# Patient Record
Sex: MALE | NOT HISPANIC OR LATINO | Employment: OTHER | URBAN - METROPOLITAN AREA
[De-identification: names, ages, dates, MRNs, and addresses within clinical notes are randomized per-mention and may not be internally consistent; named-entity substitution may affect disease eponyms.]

---

## 2023-08-08 LAB
ANION GAP IN SER/PLAS: 15 MMOL/L (ref 10–20)
CALCIUM (MG/DL) IN SER/PLAS: 8.5 MG/DL (ref 8.6–10.3)
CARBON DIOXIDE, TOTAL (MMOL/L) IN SER/PLAS: 24 MMOL/L (ref 21–32)
CHLORIDE (MMOL/L) IN SER/PLAS: 103 MMOL/L (ref 98–107)
CREATININE (MG/DL) IN SER/PLAS: 1.06 MG/DL (ref 0.5–1.3)
ERYTHROCYTE DISTRIBUTION WIDTH (RATIO) BY AUTOMATED COUNT: 13.2 % (ref 11.5–14.5)
ERYTHROCYTE MEAN CORPUSCULAR HEMOGLOBIN CONCENTRATION (G/DL) BY AUTOMATED: 33.1 G/DL (ref 32–36)
ERYTHROCYTE MEAN CORPUSCULAR VOLUME (FL) BY AUTOMATED COUNT: 92 FL (ref 80–100)
ERYTHROCYTES (10*6/UL) IN BLOOD BY AUTOMATED COUNT: 5.64 X10E12/L (ref 4.5–5.9)
GFR MALE: 70 ML/MIN/1.73M2
GLUCOSE (MG/DL) IN SER/PLAS: 83 MG/DL (ref 74–99)
HEMATOCRIT (%) IN BLOOD BY AUTOMATED COUNT: 51.9 % (ref 41–52)
HEMOGLOBIN (G/DL) IN BLOOD: 17.2 G/DL (ref 13.5–17.5)
LEUKOCYTES (10*3/UL) IN BLOOD BY AUTOMATED COUNT: 7.8 X10E9/L (ref 4.4–11.3)
MAGNESIUM (MG/DL) IN SER/PLAS: 1.92 MG/DL (ref 1.6–2.4)
PLATELETS (10*3/UL) IN BLOOD AUTOMATED COUNT: 153 X10E9/L (ref 150–450)
POTASSIUM (MMOL/L) IN SER/PLAS: 4.1 MMOL/L (ref 3.5–5.3)
SODIUM (MMOL/L) IN SER/PLAS: 138 MMOL/L (ref 136–145)
UREA NITROGEN (MG/DL) IN SER/PLAS: 14 MG/DL (ref 6–23)

## 2023-10-04 ENCOUNTER — OFFICE VISIT (OUTPATIENT)
Dept: PRIMARY CARE | Facility: CLINIC | Age: 83
End: 2023-10-04
Payer: MEDICARE

## 2023-10-04 VITALS
WEIGHT: 160 LBS | HEART RATE: 68 BPM | OXYGEN SATURATION: 96 % | BODY MASS INDEX: 23.7 KG/M2 | SYSTOLIC BLOOD PRESSURE: 126 MMHG | DIASTOLIC BLOOD PRESSURE: 79 MMHG | HEIGHT: 69 IN

## 2023-10-04 DIAGNOSIS — I69.398 CVA, OLD, HOMONYMOUS HEMIANOPSIA: ICD-10-CM

## 2023-10-04 DIAGNOSIS — R49.0 DYSPHONIA: ICD-10-CM

## 2023-10-04 DIAGNOSIS — E11.65 TYPE 2 DIABETES MELLITUS WITH HYPERGLYCEMIA, WITHOUT LONG-TERM CURRENT USE OF INSULIN (MULTI): ICD-10-CM

## 2023-10-04 DIAGNOSIS — I10 ESSENTIAL (PRIMARY) HYPERTENSION: ICD-10-CM

## 2023-10-04 DIAGNOSIS — N52.9 ERECTILE DYSFUNCTION, UNSPECIFIED ERECTILE DYSFUNCTION TYPE: Primary | ICD-10-CM

## 2023-10-04 DIAGNOSIS — H53.469 CVA, OLD, HOMONYMOUS HEMIANOPSIA: ICD-10-CM

## 2023-10-04 DIAGNOSIS — I48.0 PAROXYSMAL ATRIAL FIBRILLATION (MULTI): ICD-10-CM

## 2023-10-04 PROBLEM — G89.29 CHRONIC LEFT SHOULDER PAIN: Status: ACTIVE | Noted: 2023-10-04

## 2023-10-04 PROBLEM — J34.89 NASAL CONGESTION WITH RHINORRHEA: Status: RESOLVED | Noted: 2023-10-04 | Resolved: 2023-10-04

## 2023-10-04 PROBLEM — L91.8 OTHER HYPERTROPHIC DISORDERS OF THE SKIN: Status: ACTIVE | Noted: 2023-05-09

## 2023-10-04 PROBLEM — J02.9 ACUTE PHARYNGITIS: Status: RESOLVED | Noted: 2023-10-04 | Resolved: 2023-10-04

## 2023-10-04 PROBLEM — I25.10 CVD (CARDIOVASCULAR DISEASE): Status: ACTIVE | Noted: 2023-10-04

## 2023-10-04 PROBLEM — R97.20 HIGH PROSTATE SPECIFIC ANTIGEN (PSA): Status: RESOLVED | Noted: 2019-08-21 | Resolved: 2023-10-04

## 2023-10-04 PROBLEM — D18.01 HEMANGIOMA OF SKIN AND SUBCUTANEOUS TISSUE: Status: ACTIVE | Noted: 2023-05-09

## 2023-10-04 PROBLEM — L30.4 ERYTHEMA INTERTRIGO: Status: RESOLVED | Noted: 2023-05-09 | Resolved: 2023-10-04

## 2023-10-04 PROBLEM — L85.3 XEROSIS CUTIS: Status: ACTIVE | Noted: 2023-05-09

## 2023-10-04 PROBLEM — D22.70 MELANOCYTIC NEVI OF UNSPECIFIED LOWER LIMB, INCLUDING HIP: Status: RESOLVED | Noted: 2023-05-09 | Resolved: 2023-10-04

## 2023-10-04 PROBLEM — K76.0 STEATOSIS OF LIVER: Status: ACTIVE | Noted: 2020-02-25

## 2023-10-04 PROBLEM — I63.9 CEREBRAL INFARCTION (MULTI): Status: ACTIVE | Noted: 2023-10-04

## 2023-10-04 PROBLEM — H53.462 HOMONYMOUS HEMIANOPIA, LEFT: Status: ACTIVE | Noted: 2023-03-12

## 2023-10-04 PROBLEM — U07.1 COVID-19: Status: RESOLVED | Noted: 2023-10-04 | Resolved: 2023-10-04

## 2023-10-04 PROBLEM — L23.9 ECZEMA, ALLERGIC: Status: ACTIVE | Noted: 2023-10-04

## 2023-10-04 PROBLEM — M18.0 ARTHRITIS OF CARPOMETACARPAL (CMC) JOINT OF BOTH THUMBS: Status: ACTIVE | Noted: 2023-10-04

## 2023-10-04 PROBLEM — Z86.73 HISTORY OF CEREBROVASCULAR ACCIDENT: Status: ACTIVE | Noted: 2022-03-02

## 2023-10-04 PROBLEM — L81.4 OTHER MELANIN HYPERPIGMENTATION: Status: ACTIVE | Noted: 2023-05-09

## 2023-10-04 PROBLEM — R29.898 LEFT ARM WEAKNESS: Status: ACTIVE | Noted: 2023-10-04

## 2023-10-04 PROBLEM — D22.5 MELANOCYTIC NEVI OF TRUNK: Status: RESOLVED | Noted: 2023-05-09 | Resolved: 2023-10-04

## 2023-10-04 PROBLEM — R40.1 CLOUDED CONSCIOUSNESS: Status: ACTIVE | Noted: 2023-10-04

## 2023-10-04 PROBLEM — C44.311 BASAL CELL CARCINOMA OF SKIN OF NOSE: Status: ACTIVE | Noted: 2023-05-09

## 2023-10-04 PROBLEM — H53.10 SUBJECTIVE VISUAL DISTURBANCE: Status: ACTIVE | Noted: 2023-03-12

## 2023-10-04 PROBLEM — S01.501D UNSPECIFIED OPEN WOUND OF LIP, SUBSEQUENT ENCOUNTER: Status: ACTIVE | Noted: 2023-05-09

## 2023-10-04 PROBLEM — E78.2 MIXED HYPERLIPIDEMIA: Status: ACTIVE | Noted: 2023-10-04

## 2023-10-04 PROBLEM — L71.9 ROSACEA, UNSPECIFIED: Status: ACTIVE | Noted: 2023-05-09

## 2023-10-04 PROBLEM — F41.9 CHRONIC ANXIETY: Status: ACTIVE | Noted: 2022-06-07

## 2023-10-04 PROBLEM — R27.8 DECREASED COORDINATION: Status: ACTIVE | Noted: 2023-10-04

## 2023-10-04 PROBLEM — E78.00 HYPERCHOLESTEROLEMIA: Status: ACTIVE | Noted: 2019-08-21

## 2023-10-04 PROBLEM — R41.4: Status: ACTIVE | Noted: 2023-10-04

## 2023-10-04 PROBLEM — H92.01 OTALGIA, RIGHT: Status: ACTIVE | Noted: 2023-10-04

## 2023-10-04 PROBLEM — Z85.828 PERSONAL HISTORY OF OTHER MALIGNANT NEOPLASM OF SKIN: Status: ACTIVE | Noted: 2023-05-09

## 2023-10-04 PROBLEM — D69.49 PRIMARY THROMBOCYTOPENIA (MULTI): Status: ACTIVE | Noted: 2020-02-25

## 2023-10-04 PROBLEM — L90.5 SCAR CONDITION AND FIBROSIS OF SKIN: Status: ACTIVE | Noted: 2023-05-09

## 2023-10-04 PROBLEM — L82.1 OTHER SEBORRHEIC KERATOSIS: Status: RESOLVED | Noted: 2023-05-09 | Resolved: 2023-10-04

## 2023-10-04 PROBLEM — L57.0 ACTINIC KERATOSIS: Status: RESOLVED | Noted: 2023-05-09 | Resolved: 2023-10-04

## 2023-10-04 PROBLEM — J02.9 SORE THROAT: Status: RESOLVED | Noted: 2023-10-04 | Resolved: 2023-10-04

## 2023-10-04 PROBLEM — I69.30 HISTORY OF STROKE WITH CURRENT RESIDUAL EFFECTS: Status: ACTIVE | Noted: 2023-10-04

## 2023-10-04 PROBLEM — M25.512 CHRONIC LEFT SHOULDER PAIN: Status: ACTIVE | Noted: 2023-10-04

## 2023-10-04 PROBLEM — R05.9 COUGH: Status: RESOLVED | Noted: 2023-10-04 | Resolved: 2023-10-04

## 2023-10-04 PROBLEM — D22.60 MELANOCYTIC NEVI OF UNSPECIFIED UPPER LIMB, INCLUDING SHOULDER: Status: RESOLVED | Noted: 2023-05-09 | Resolved: 2023-10-04

## 2023-10-04 PROBLEM — C44.90 MALIGNANT NEOPLASM OF SKIN: Status: ACTIVE | Noted: 2019-08-21

## 2023-10-04 PROBLEM — D48.5 NEOPLASM OF UNCERTAIN BEHAVIOR OF SKIN: Status: ACTIVE | Noted: 2023-05-09

## 2023-10-04 PROBLEM — K80.20 CHOLELITHIASIS WITHOUT OBSTRUCTION: Status: RESOLVED | Noted: 2020-02-25 | Resolved: 2023-10-04

## 2023-10-04 PROBLEM — L82.0 INFLAMED SEBORRHEIC KERATOSIS: Status: RESOLVED | Noted: 2023-05-09 | Resolved: 2023-10-04

## 2023-10-04 PROBLEM — L21.9 SEBORRHEIC DERMATITIS, UNSPECIFIED: Status: ACTIVE | Noted: 2023-05-09

## 2023-10-04 PROBLEM — I63.411 CEREBROVASCULAR ACCIDENT (CVA) DUE TO EMBOLISM OF RIGHT MIDDLE CEREBRAL ARTERY (MULTI): Status: ACTIVE | Noted: 2023-10-04

## 2023-10-04 PROBLEM — F41.9 ANXIETY: Status: ACTIVE | Noted: 2023-10-04

## 2023-10-04 PROBLEM — R09.81 NASAL CONGESTION WITH RHINORRHEA: Status: RESOLVED | Noted: 2023-10-04 | Resolved: 2023-10-04

## 2023-10-04 PROBLEM — R41.841 COGNITIVE COMMUNICATION DEFICIT: Status: ACTIVE | Noted: 2023-10-04

## 2023-10-04 PROCEDURE — 3074F SYST BP LT 130 MM HG: CPT | Performed by: FAMILY MEDICINE

## 2023-10-04 PROCEDURE — 1036F TOBACCO NON-USER: CPT | Performed by: FAMILY MEDICINE

## 2023-10-04 PROCEDURE — 99203 OFFICE O/P NEW LOW 30 MIN: CPT | Performed by: FAMILY MEDICINE

## 2023-10-04 PROCEDURE — 1159F MED LIST DOCD IN RCRD: CPT | Performed by: FAMILY MEDICINE

## 2023-10-04 PROCEDURE — 3078F DIAST BP <80 MM HG: CPT | Performed by: FAMILY MEDICINE

## 2023-10-04 RX ORDER — METOPROLOL SUCCINATE 50 MG/1
1 TABLET, EXTENDED RELEASE ORAL DAILY
COMMUNITY
End: 2023-10-04 | Stop reason: SDUPTHER

## 2023-10-04 RX ORDER — SERTRALINE HYDROCHLORIDE 25 MG/1
1 TABLET, FILM COATED ORAL DAILY
COMMUNITY
Start: 2022-01-18 | End: 2023-10-04 | Stop reason: ALTCHOICE

## 2023-10-04 RX ORDER — DESONIDE 0.5 MG/G
CREAM TOPICAL
COMMUNITY
Start: 2022-07-13 | End: 2023-10-04 | Stop reason: ALTCHOICE

## 2023-10-04 RX ORDER — FLUOROURACIL 50 MG/G
CREAM TOPICAL
COMMUNITY
Start: 2017-10-06 | End: 2023-10-04 | Stop reason: ALTCHOICE

## 2023-10-04 RX ORDER — ROSUVASTATIN CALCIUM 20 MG/1
TABLET, COATED ORAL
COMMUNITY
End: 2023-10-04 | Stop reason: ALTCHOICE

## 2023-10-04 RX ORDER — ASPIRIN 81 MG/1
81 TABLET ORAL 2 TIMES DAILY
COMMUNITY

## 2023-10-04 RX ORDER — AMMONIUM LACTATE 12 G/100G
CREAM TOPICAL
COMMUNITY
Start: 2022-08-24 | End: 2023-10-04 | Stop reason: ALTCHOICE

## 2023-10-04 RX ORDER — KETOCONAZOLE 20 MG/G
CREAM TOPICAL
COMMUNITY
Start: 2022-04-27 | End: 2023-10-04 | Stop reason: ALTCHOICE

## 2023-10-04 RX ORDER — METOPROLOL SUCCINATE 100 MG/1
100 TABLET, EXTENDED RELEASE ORAL
COMMUNITY
Start: 2023-06-29

## 2023-10-04 RX ORDER — PIMECROLIMUS 10 MG/G
CREAM TOPICAL
COMMUNITY
Start: 2022-12-16 | End: 2023-10-04 | Stop reason: ALTCHOICE

## 2023-10-04 RX ORDER — ALBUTEROL SULFATE 90 UG/1
AEROSOL, METERED RESPIRATORY (INHALATION)
COMMUNITY
Start: 2022-11-19 | End: 2023-10-04 | Stop reason: ALTCHOICE

## 2023-10-04 RX ORDER — DEXAMETHASONE 6 MG/1
TABLET ORAL
COMMUNITY
Start: 2022-11-19 | End: 2023-10-04 | Stop reason: ALTCHOICE

## 2023-10-04 ASSESSMENT — ENCOUNTER SYMPTOMS
DIFFICULTY URINATING: 0
WHEEZING: 0
DYSURIA: 0
LIGHT-HEADEDNESS: 0
LOSS OF SENSATION IN FEET: 0
CONFUSION: 0
VOMITING: 0
DIZZINESS: 0
CHILLS: 0
NUMBNESS: 0
TROUBLE SWALLOWING: 0
COUGH: 0
UNEXPECTED WEIGHT CHANGE: 0
BLOOD IN STOOL: 0
DIARRHEA: 0
ABDOMINAL PAIN: 0
NAUSEA: 0
DEPRESSION: 0
WEAKNESS: 0
SHORTNESS OF BREATH: 0
FEVER: 0
OCCASIONAL FEELINGS OF UNSTEADINESS: 0

## 2023-10-04 ASSESSMENT — PATIENT HEALTH QUESTIONNAIRE - PHQ9
1. LITTLE INTEREST OR PLEASURE IN DOING THINGS: NOT AT ALL
SUM OF ALL RESPONSES TO PHQ9 QUESTIONS 1 AND 2: 0
2. FEELING DOWN, DEPRESSED OR HOPELESS: NOT AT ALL

## 2023-10-04 NOTE — PROGRESS NOTES
"Subjective   Patient ID: Lv Quinteros is a 83 y.o. male who presents for Establish Care (Pt presents as new pt to establish care, c/o erectile dysfunction, wants to discuss testing, no rxs needed.BL).  HPI    Previously saw Dr. Ruiz, last saw him   Sees cardiology [facility] Dr. Nicholas Rivas twice a year. Reports no longer advised to take an anticoagulant.  Sees Allan Wallis for h/o SCC.  Not seeing urology. Denies hesitancy.     H/o stroke. No longer seeing neurology. Residual homonomous hemianopsia, decreased sensation left hand, voice change, mildly labile emotions (laughing or crying easily).    Would like to try Viagra or Cialis.    Just got COVID shot.      Review of Systems   Constitutional:  Negative for chills, fever and unexpected weight change.   HENT:  Negative for ear pain and trouble swallowing.    Respiratory:  Negative for cough, shortness of breath and wheezing.    Cardiovascular:  Negative for chest pain.   Gastrointestinal:  Negative for abdominal pain, blood in stool, diarrhea, nausea and vomiting.   Genitourinary:  Negative for difficulty urinating and dysuria.   Skin:  Negative for rash.   Neurological:  Negative for dizziness, syncope, weakness, light-headedness and numbness.   Psychiatric/Behavioral:  Negative for behavioral problems and confusion.          Objective   /79   Pulse 68   Ht 1.753 m (5' 9\")   Wt 72.6 kg (160 lb)   SpO2 96%   BMI 23.63 kg/m²     Physical Exam  Vitals and nursing note reviewed.   Constitutional:       General: He is not in acute distress.     Appearance: Normal appearance. He is not diaphoretic.   HENT:      Head: Normocephalic and atraumatic.   Eyes:      General: No scleral icterus.     Extraocular Movements: Extraocular movements intact.      Conjunctiva/sclera: Conjunctivae normal.   Cardiovascular:      Rate and Rhythm: Normal rate and regular rhythm.      Heart sounds: Normal heart sounds.   Pulmonary:      Effort: Pulmonary effort is normal. No " respiratory distress.      Breath sounds: Normal breath sounds.   Abdominal:      General: Bowel sounds are normal. There is no distension.      Palpations: Abdomen is soft. There is no mass.      Tenderness: There is no abdominal tenderness. There is no guarding or rebound.   Musculoskeletal:      Right lower leg: No edema.      Left lower leg: No edema.   Skin:     General: Skin is warm and dry.      Coloration: Skin is not jaundiced.   Neurological:      General: No focal deficit present.      Mental Status: He is alert and oriented to person, place, and time. Mental status is at baseline.   Psychiatric:         Mood and Affect: Mood normal.         Thought Content: Thought content normal.         Assessment/Plan   Problem List Items Addressed This Visit       Essential (primary) hypertension     Well controlled.          Relevant Orders    Lipid Panel    Hepatic Function Panel    Albumin, urine, random    TSH with reflex to Free T4 if abnormal    Type 2 diabetes mellitus with hyperglycemia (CMS/HCC)    Relevant Orders    Hemoglobin A1C    Paroxysmal atrial fibrillation (CMS/HCC)    Relevant Medications    metoprolol succinate XL (Toprol-XL) 100 mg 24 hr tablet    CVA, old, homonymous hemianopsia    Relevant Orders    Referral to ENT    Erectile dysfunction - Primary     May consider Sildenafil or Tadalafil if ok with cardiology.         Dysphonia    Relevant Orders    Referral to ENT

## 2023-10-04 NOTE — PATIENT INSTRUCTIONS
Could consider Viagra/sildenafil or Cialis/tadalafil, but only if ok with your cardiologist. Please check with Dr. Rivas, to see if he's ok with one of these medications.    Please return for a follow-up appointment in 3 months, earlier if any question or concern.    Otolarynglogy (ENT)  Dr. Juan Ramon Bonilla, Timothy Campoverde, Usman Jung ([place]) [phone]  Dr. Mona Ellington ([place]) [phone]  Dr. Sasha Oswald ([place]) [phone]  Dr. Sher Goodman ([place]) [phone]      For assistance with scheduling referrals or consultations, please call [phone]. For laboratory, radiology, and other tests, please call [phone] ([phone] for pediatrics). Please review prescription inserts and published information for possible adverse effects of all medications. Return after testing or consultation to review results and recommendations, if symptoms persist, change, worsen, or return, or if you have any question or concern. If you do not get results within 7-10 days, or you have any question or concern, please send a message, call the office ([phone]), or return to the office for a follow-up appointment. For non-emergencies, you may call the office. For emergencies, call 9-1-1 or go to the nearest Emergency Department. Please schedule additional appointment(s) to address concern(s) not addressed today.    In general, results are not released or discussed over the telephone. Results of tests done through [facility] are released via  [employer] (see below).  [email or web address]   [employer] support line: [phone]    Until we complete our transition to the new system, additional information can be found at [email or web address] or on your Android or iOS (iPhone, iPad) device using the [employer] rik available free of charge in your device's rik store.

## 2023-10-17 ENCOUNTER — LAB (OUTPATIENT)
Dept: LAB | Facility: LAB | Age: 83
End: 2023-10-17
Payer: MEDICARE

## 2023-10-17 DIAGNOSIS — I10 ESSENTIAL (PRIMARY) HYPERTENSION: ICD-10-CM

## 2023-10-17 DIAGNOSIS — E11.65 TYPE 2 DIABETES MELLITUS WITH HYPERGLYCEMIA, WITHOUT LONG-TERM CURRENT USE OF INSULIN (MULTI): ICD-10-CM

## 2023-10-17 LAB
ALBUMIN SERPL BCP-MCNC: 4 G/DL (ref 3.4–5)
ALP SERPL-CCNC: 42 U/L (ref 33–136)
ALT SERPL W P-5'-P-CCNC: 21 U/L (ref 10–52)
AST SERPL W P-5'-P-CCNC: 18 U/L (ref 9–39)
BILIRUB DIRECT SERPL-MCNC: 0.2 MG/DL (ref 0–0.3)
BILIRUB SERPL-MCNC: 1 MG/DL (ref 0–1.2)
CHOLEST SERPL-MCNC: 185 MG/DL (ref 0–199)
CHOLESTEROL/HDL RATIO: 4.2
HDLC SERPL-MCNC: 43.8 MG/DL
LDLC SERPL CALC-MCNC: 118 MG/DL
NON HDL CHOLESTEROL: 141 MG/DL (ref 0–149)
PROT SERPL-MCNC: 5.8 G/DL (ref 6.4–8.2)
TRIGL SERPL-MCNC: 116 MG/DL (ref 0–149)
TSH SERPL-ACNC: 1.39 MIU/L (ref 0.44–3.98)
VLDL: 23 MG/DL (ref 0–40)

## 2023-10-17 PROCEDURE — 84443 ASSAY THYROID STIM HORMONE: CPT

## 2023-10-17 PROCEDURE — 80076 HEPATIC FUNCTION PANEL: CPT

## 2023-10-17 PROCEDURE — 83036 HEMOGLOBIN GLYCOSYLATED A1C: CPT

## 2023-10-17 PROCEDURE — 82570 ASSAY OF URINE CREATININE: CPT

## 2023-10-17 PROCEDURE — 82043 UR ALBUMIN QUANTITATIVE: CPT

## 2023-10-17 PROCEDURE — 80061 LIPID PANEL: CPT

## 2023-10-17 PROCEDURE — 36415 COLL VENOUS BLD VENIPUNCTURE: CPT

## 2023-10-18 LAB
CREAT UR-MCNC: 73.4 MG/DL (ref 20–370)
EST. AVERAGE GLUCOSE BLD GHB EST-MCNC: 100 MG/DL
HBA1C MFR BLD: 5.1 %
MICROALBUMIN UR-MCNC: <7 MG/L
MICROALBUMIN/CREAT UR: NORMAL MG/G{CREAT}

## 2023-10-25 ENCOUNTER — OFFICE VISIT (OUTPATIENT)
Dept: ORTHOPEDIC SURGERY | Facility: CLINIC | Age: 83
End: 2023-10-25
Payer: MEDICARE

## 2023-10-25 ENCOUNTER — HOSPITAL ENCOUNTER (OUTPATIENT)
Dept: RADIOLOGY | Facility: HOSPITAL | Age: 83
Discharge: HOME | End: 2023-10-25
Payer: MEDICARE

## 2023-10-25 VITALS — BODY MASS INDEX: 22.19 KG/M2 | WEIGHT: 155 LBS | HEIGHT: 70 IN

## 2023-10-25 DIAGNOSIS — M25.562 LEFT KNEE PAIN, UNSPECIFIED CHRONICITY: ICD-10-CM

## 2023-10-25 DIAGNOSIS — M25.562 ACUTE PAIN OF LEFT KNEE: ICD-10-CM

## 2023-10-25 DIAGNOSIS — M25.562 ACUTE PAIN OF LEFT KNEE: Primary | ICD-10-CM

## 2023-10-25 DIAGNOSIS — M17.12 ARTHRITIS OF LEFT KNEE: ICD-10-CM

## 2023-10-25 PROCEDURE — 1160F RVW MEDS BY RX/DR IN RCRD: CPT | Performed by: ORTHOPAEDIC SURGERY

## 2023-10-25 PROCEDURE — 73564 X-RAY EXAM KNEE 4 OR MORE: CPT | Mod: LT

## 2023-10-25 PROCEDURE — 99204 OFFICE O/P NEW MOD 45 MIN: CPT | Performed by: ORTHOPAEDIC SURGERY

## 2023-10-25 PROCEDURE — 73564 X-RAY EXAM KNEE 4 OR MORE: CPT | Mod: LEFT SIDE | Performed by: RADIOLOGY

## 2023-10-25 PROCEDURE — 1159F MED LIST DOCD IN RCRD: CPT | Performed by: ORTHOPAEDIC SURGERY

## 2023-10-25 PROCEDURE — 1036F TOBACCO NON-USER: CPT | Performed by: ORTHOPAEDIC SURGERY

## 2023-10-26 RX ORDER — MELOXICAM 15 MG/1
15 TABLET ORAL DAILY PRN
Qty: 30 TABLET | Refills: 11 | Status: SHIPPED | OUTPATIENT
Start: 2023-10-26 | End: 2024-01-31 | Stop reason: ALTCHOICE

## 2023-10-26 NOTE — PROGRESS NOTES
83-year-old male who injured his left knee 4 days prior.  The patient was getting out of limousine when he slipped and sustained a hyperflexion to the left knee.  He was trapped in that position for at least a few minutes.  Since then he had a significant amount of pain in the left knee.  However the pain has been improving.  At first he could not walk on the leg but has been able to be putting more weight on the leg.  Pain is worse activity better with rest    Patients' self reported past medical history, medications, allergies, surgical history, family and social history as well as a 10 point review of systems has been documented in the new patient intake form and scanned into the patient's electronic medical record.  The intake form was reviewed by Dr Willett during the office visit and signed by Dr. Willett and the patient.  Pertinent findings are documented in the HPI.    General Multi-System Physical Exam:  Constitutional  General appearance:  Alert, oriented, and in no acute distress.  Well developed, well nourished.  Head and Face  Head and face:  Normocephalic and atraumatic.  Ears, Nose, Mouth, and Throat  External inspection of ears and nose: Normal.  Eyes:  Pupils are equal and round.  Neck  Neck:  no neck mass was observed.  Pulmonary  Respiratory effort:  no respiratory distress.  Cardiovascular  Intact distal pulses.  Lymphatic  Palpation of lymph nodes in the affected extremity:  Normal.  Skin  Skin and subcutaneous tissue:  Normal skin color and pigmentation.  Normal skin turgor.  No rashes.  Neurologic  Sensation:  normal to light touch.  Psychiatric  Judgement and insight:  Intact.  Mood and affect:  Normal.  Musculoskeletal  Range of motion left knee is from 0 to 110 degrees with no effusion mild medial joint line tenderness no lateral joint line tenderness.  Patient has a negative Lockman exam, negative anterior and negative posterior drawer. The knee is stable to varus and valgus stress without  pain. Patient is neurovascularly intact in the bilateral lower extremities.      X-rays of the patient were ordered by Dr Willett and obtained today.  Dr Willett personally reviewed the results of the x-rays.    In addition, Dr Willett independently interpreted the patient's x-rays (performed by the Radiology department) by viewing the x-ray images and this is Dr. Willett's personal interpretation:     Left knee mild medial compartment arthritis    I had a long discussion with the patient regards to his left knee.  We offered him a steroid injection but he wanted to hold off on this.  We gave him prescription for meloxicam.  If his pain worsens I would want to see him back again we would then try an injection or possibly get an MRI of the knee.  I will see him back as needed        This patient has a new, acute, previously-undiagnosed problem of their affected extremity.  We will begin treatment as listed here and monitor treatment based upon their progression and response to treatment.  Due to the fact that we are just beginning treatment on this issue, this is currently considered an undiagnosed new problem with uncertain prognosis.  The exact diagnosis and their prognosis will depend upon their response to treatment and progression of their condition as time progresses.    Due to this patient's condition, they are at a moderate risk of morbidity from additional diagnostic testing / treatment.      To help them with their pain, I wrote them a prescription for prescription strength anti-inflammatories.  The patient was informed that there are risks of using nonsteroidal antiinflammatory (NSAID) medications.    Risks of NSAIDS include, but are not limited to, upset stomach, ulcers in the stomach and other places in the gastrointestinal tract, and a mild increase in cardiovascular risk as a result of the antiinflammatory medications.  In addition, there is an increased risk in bleeding as a result of the medications.     The patient was advised to stop taking the NSAIDs if they cause them to have an upset stomach.  NSAIDs are not supposed to be taken every day for more than a few weeks.  If they have any questions or problems with the antiinflammatory medications, they should stop taking the medication immediately and call the office.

## 2023-10-30 RX ORDER — RIVAROXABAN 20 MG/1
1 TABLET, FILM COATED ORAL DAILY
COMMUNITY
End: 2024-01-31 | Stop reason: ALTCHOICE

## 2023-10-30 RX ORDER — CLINDAMYCIN HYDROCHLORIDE 300 MG/1
1 CAPSULE ORAL 3 TIMES DAILY
COMMUNITY
End: 2024-01-31 | Stop reason: ALTCHOICE

## 2023-10-30 RX ORDER — OFLOXACIN 3 MG/ML
1 SOLUTION/ DROPS OPHTHALMIC 4 TIMES DAILY
COMMUNITY
End: 2024-01-31 | Stop reason: ALTCHOICE

## 2023-10-30 RX ORDER — LORAZEPAM 0.5 MG/1
0.5 TABLET ORAL DAILY PRN
COMMUNITY
End: 2024-01-31 | Stop reason: ALTCHOICE

## 2023-10-30 RX ORDER — SODIUM SULFACETAMIDE, SULFUR 90; 40 MG/473.2ML; MG/473.2ML
LIQUID TOPICAL DAILY
COMMUNITY
End: 2024-01-08 | Stop reason: SDUPTHER

## 2023-10-30 RX ORDER — TACROLIMUS 1 MG/G
OINTMENT TOPICAL
COMMUNITY
Start: 2022-10-05 | End: 2024-01-31 | Stop reason: ALTCHOICE

## 2023-10-30 RX ORDER — FLUOCINOLONE ACETONIDE 0.11 MG/ML
5 OIL AURICULAR (OTIC) 2 TIMES DAILY
COMMUNITY
End: 2024-01-31 | Stop reason: ALTCHOICE

## 2023-10-30 RX ORDER — PREDNISOLONE ACETATE 10 MG/ML
1 SUSPENSION/ DROPS OPHTHALMIC 4 TIMES DAILY
COMMUNITY
End: 2024-01-31 | Stop reason: ALTCHOICE

## 2023-11-01 ENCOUNTER — TELEPHONE (OUTPATIENT)
Dept: PRIMARY CARE | Facility: CLINIC | Age: 83
End: 2023-11-01

## 2023-11-07 NOTE — TELEPHONE ENCOUNTER
"----- Message from hCad Buchanan DO sent at 11/7/2023  2:54 PM EST -----  Please let patient know that his LDL (a \"bad\" cholesterol) is near optimal. Recommend a low-sugar, low-fat, low-cholesterol, high-fiber, heart-healthy diet and lifestyle, maintaining a healthy blood pressure, and regular cardio exercise and weight loss as appropriate.     His TSH (thyroid-stimulating hormone), A1c, and hepatic/liver panel are normal or unremarkable.  "

## 2024-01-04 RX ORDER — ERYTHROMYCIN 5 MG/G
OINTMENT OPHTHALMIC
COMMUNITY
Start: 2023-10-30 | End: 2024-01-31 | Stop reason: ALTCHOICE

## 2024-01-08 ENCOUNTER — OFFICE VISIT (OUTPATIENT)
Dept: DERMATOLOGY | Facility: CLINIC | Age: 84
End: 2024-01-08
Payer: MEDICARE

## 2024-01-08 DIAGNOSIS — D22.9 BENIGN NEVUS: ICD-10-CM

## 2024-01-08 DIAGNOSIS — L90.5 SCAR CONDITIONS AND FIBROSIS OF SKIN: ICD-10-CM

## 2024-01-08 DIAGNOSIS — L21.9 SEBORRHEIC DERMATITIS: ICD-10-CM

## 2024-01-08 DIAGNOSIS — L81.4 LENTIGO: ICD-10-CM

## 2024-01-08 DIAGNOSIS — L82.1 SEBORRHEIC KERATOSIS: ICD-10-CM

## 2024-01-08 DIAGNOSIS — Z85.828 HISTORY OF NONMELANOMA SKIN CANCER: ICD-10-CM

## 2024-01-08 DIAGNOSIS — D18.01 ANGIOMA OF SKIN: ICD-10-CM

## 2024-01-08 DIAGNOSIS — D48.5 NEOPLASM OF UNCERTAIN BEHAVIOR OF SKIN: Primary | ICD-10-CM

## 2024-01-08 PROCEDURE — 1159F MED LIST DOCD IN RCRD: CPT | Performed by: NURSE PRACTITIONER

## 2024-01-08 PROCEDURE — 11102 TANGNTL BX SKIN SINGLE LES: CPT | Performed by: NURSE PRACTITIONER

## 2024-01-08 PROCEDURE — 99213 OFFICE O/P EST LOW 20 MIN: CPT | Performed by: NURSE PRACTITIONER

## 2024-01-08 PROCEDURE — 1036F TOBACCO NON-USER: CPT | Performed by: NURSE PRACTITIONER

## 2024-01-08 PROCEDURE — 88305 TISSUE EXAM BY PATHOLOGIST: CPT | Performed by: DERMATOLOGY

## 2024-01-08 PROCEDURE — 88305 TISSUE EXAM BY PATHOLOGIST: CPT | Mod: TC,DER | Performed by: NURSE PRACTITIONER

## 2024-01-08 PROCEDURE — 1160F RVW MEDS BY RX/DR IN RCRD: CPT | Performed by: NURSE PRACTITIONER

## 2024-01-08 RX ORDER — SODIUM SULFACETAMIDE, SULFUR 90; 40 MG/473.2ML; MG/473.2ML
1 LIQUID TOPICAL DAILY
Qty: 473 ML | Refills: 11 | Status: SHIPPED | OUTPATIENT
Start: 2024-01-08 | End: 2024-01-12 | Stop reason: SDUPTHER

## 2024-01-08 NOTE — PATIENT INSTRUCTIONS

## 2024-01-08 NOTE — PROGRESS NOTES
Subjective     Lv Quinteros is a 83 y.o. male who presents for the following: Skin Check (Waist up).     Review of Systems:  No other skin or systemic complaints other than what is documented elsewhere in the note.    The following portions of the chart were reviewed this encounter and updated as appropriate:   Tobacco  Allergies  Meds  Problems  Med Hx  Surg Hx         Skin Cancer History  No skin cancer on file.      Specialty Problems          Dermatology Problems    Malignant neoplasm of skin    Hemangioma of skin and subcutaneous tissue    Other hypertrophic disorders of the skin    Other melanin hyperpigmentation    Personal history of other malignant neoplasm of skin    Rosacea, unspecified    Scar condition and fibrosis of skin    Seborrheic dermatitis, unspecified    Xerosis cutis    Eczema, allergic        Objective   Well appearing patient in no apparent distress; mood and affect are within normal limits.    A focused skin examination was performed waist up. All findings within normal limits unless otherwise noted below.    Assessment/Plan   1. Neoplasm of uncertain behavior of skin  Right Forearm - Posterior  4 mm hyperkeratotic erythematous papule          Lesion biopsy  Type of biopsy: tangential    Informed consent: discussed and consent obtained    Timeout: patient name, date of birth, surgical site, and procedure verified    Procedure prep:  Patient was prepped and draped  Anesthesia: the lesion was anesthetized in a standard fashion    Anesthetic:  1% lidocaine plain local infiltration  Instrument used: DermaBlade    Hemostasis achieved with: electrodesiccation    Outcome: patient tolerated procedure well    Post-procedure details: wound care instructions given    Additional details:  Cleaned area with isopropyl alcohol prior to anesthesia or biopsy. Applied thin layer of vaseline and covered with bandaid after procedure      Staff Communication: Dermatology Local Anesthesia: 1 % Plain  Lidocaine - Amount:0.5 ml    Specimen 1 - Dermatopathology- DERM LAB  Differential Diagnosis: NMSC  Check Margins Yes/No?:    Comments:    Dermpath Lab: Routine Histopathology (formalin-fixed tissue)    NUB  - Given uncertainty in clinical diagnosis, shave biopsy is recommended in clinic today.  - The patient expressed understanding, is in agreement with this plan, and wishes to proceed with biopsy.  - Oral and written wound care instructions provided.  - Advised patient that the office will call within 2 weeks to discuss biopsy results.      2. Angioma of skin  Scattered cherry-red papule(s).    A cherry hemangioma is a small macule (small, flat, smooth area) or papule (small, solid bump) formed from an overgrowth of tiny blood vessels in the skin. Cherry hemangiomas are characteristically red or purplish in color. They often first appear in middle adulthood and usually increase in number with age. Cherry hemangiomas are noncancerous (benign) and are common in adults.    The present appearance of the lesion is not worrisome but it should continue to be observed and testing/treatment may be warranted if change occurs.    3. Benign nevus  Scattered, uniform and benign-appearing, regular brown melanocytic papules and macules.    The present appearance of the lesion is not worrisome but it should continue to be observed and testing/treatment may be warranted if change occurs.    4. Seborrheic keratosis  Stuck on verrucous, tan-brown papules and plaques.      Seborrheic keratoses are common noncancerous (benign) growths of unknown cause seen in adults due to a thickening of an area of the top skin layer. Seborrheic keratoses may appear as if they are stuck on to the skin. They have distinct borders, and they may appear as papules (small, solid bumps) or plaques (solid, raised patches that are bigger than a thumbnail). They may be the same color as your skin, or they may be pink, light brown, darker brown, or very dark  brown, or sometimes may appear black.    There is no way to prevent new seborrheic keratoses from forming. Seborrheic keratoses can be removed, but removal is considered a cosmetic issue and is usually not covered by insurance.    PLAN  No treatment is needed unless there is irritation from clothing, such as itching or bleeding.  2.   Some lotions containing alpha hydroxy acids, salicylic acid, or urea may make the areas feel smoother with regular use but will not eliminate them.    5. Lentigo  Scattered tan macules in sun-exposed areas.    A solar lentigo (plural, solar lentigines), also known as a sun-induced freckle or senile lentigo, is a dark (hyperpigmented) lesion caused by natural or artificial ultraviolet (UV) light. Solar lentigines may be single or multiple. This type of lentigo is different from a simple lentigo (lentigo simplex) because it is caused by exposure to UV light. Solar lentigines are benign, but they do indicate excessive sun exposure, a risk factor for the development of skin cancer.    To prevent solar lentigines, avoid exposure to sunlight in midday (10 AM to 3 PM), wear sun-protective clothing (tightly woven clothes and hats), and apply sunscreen (SPF 30 UVA and UVB block).    The present appearance of the lesion is not worrisome but it should continue to be observed and testing/treatment may be warranted if change occurs.    6. History of nonmelanoma skin cancer    ABCDEs of melanoma and atypical moles were discussed with the patient.    Patient was instructed to perform monthly self skin examination.  We recommended that the patient have regular full skin exams given an increased risk of subsequent skin cancers.    The patient was instructed to use sun protective behaviors including use of broad spectrum sunscreens and sun protective clothing to reduce risk of skin cancers.    Warning signs of non-melanoma skin cancer discussed.    7. Scar conditions and fibrosis of skin (2)  Left Ala  Nasi, Right Forearm - Posterior  Well healed scar at the site(s) of prior treatment with no evidence of recurrence.                The scar is clear, there is no evidence of recurrence.  The present appearance of the scar is not worrisome but it should continue to be observed and testing/treatment may be warranted if change occurs.      8. Seborrheic dermatitis  Chest - Medial (Center), Head - Anterior (Face), Left Abdomen (side) - Upper, Left Breast, Left Inframammary Fold, Left Lower Back, Left Upper Back, Mid Back, Right Abdomen (side) - Upper, Right Breast, Right Lower Back, Right Upper Back  Mixture of dry and greasy scaly patches    PLAN    Restart SS wash 1-2 times daily as needed rinse after 3-5 minutes.     Related Medications  sulfacetamide sodium-sulfur 9-4 % cleanser  Apply 1 Application topically once daily.        Return in 6 months for routine skin check or return to clinic sooner if needed

## 2024-01-10 LAB
LABORATORY COMMENT REPORT: NORMAL
PATH REPORT.FINAL DX SPEC: NORMAL
PATH REPORT.GROSS SPEC: NORMAL
PATH REPORT.MICROSCOPIC SPEC OTHER STN: NORMAL
PATH REPORT.RELEVANT HX SPEC: NORMAL
PATH REPORT.TOTAL CANCER: NORMAL

## 2024-01-12 ENCOUNTER — TELEPHONE (OUTPATIENT)
Dept: DERMATOLOGY | Facility: CLINIC | Age: 84
End: 2024-01-12
Payer: MEDICARE

## 2024-01-12 DIAGNOSIS — L21.9 SEBORRHEIC DERMATITIS: ICD-10-CM

## 2024-01-12 RX ORDER — SODIUM SULFACETAMIDE, SULFUR 90; 40 MG/473.2ML; MG/473.2ML
1 LIQUID TOPICAL DAILY
Qty: 473 ML | Refills: 11 | Status: SHIPPED | OUTPATIENT
Start: 2024-01-12

## 2024-01-12 NOTE — TELEPHONE ENCOUNTER
Patient would like his Sulfacetamide sodium sulfur cleanser sent into [facility] in [place].

## 2024-01-12 NOTE — TELEPHONE ENCOUNTER
Pre skin cancer. Return to clinic in the next 1-2 months for cryotherapy given on the margins.   Patient will have this area treated at his 6 month appt on 07/08/24

## 2024-01-31 ENCOUNTER — OFFICE VISIT (OUTPATIENT)
Dept: PRIMARY CARE | Facility: CLINIC | Age: 84
End: 2024-01-31
Payer: MEDICARE

## 2024-01-31 VITALS
OXYGEN SATURATION: 94 % | DIASTOLIC BLOOD PRESSURE: 65 MMHG | WEIGHT: 166.38 LBS | HEIGHT: 70 IN | SYSTOLIC BLOOD PRESSURE: 132 MMHG | HEART RATE: 56 BPM | BODY MASS INDEX: 23.82 KG/M2

## 2024-01-31 DIAGNOSIS — K40.90 RIGHT INGUINAL HERNIA: Primary | ICD-10-CM

## 2024-01-31 PROCEDURE — 3078F DIAST BP <80 MM HG: CPT | Performed by: FAMILY MEDICINE

## 2024-01-31 PROCEDURE — 1157F ADVNC CARE PLAN IN RCRD: CPT | Performed by: FAMILY MEDICINE

## 2024-01-31 PROCEDURE — 3075F SYST BP GE 130 - 139MM HG: CPT | Performed by: FAMILY MEDICINE

## 2024-01-31 PROCEDURE — 1160F RVW MEDS BY RX/DR IN RCRD: CPT | Performed by: FAMILY MEDICINE

## 2024-01-31 PROCEDURE — 1159F MED LIST DOCD IN RCRD: CPT | Performed by: FAMILY MEDICINE

## 2024-01-31 PROCEDURE — 99214 OFFICE O/P EST MOD 30 MIN: CPT | Performed by: FAMILY MEDICINE

## 2024-01-31 PROCEDURE — 1036F TOBACCO NON-USER: CPT | Performed by: FAMILY MEDICINE

## 2024-01-31 RX ORDER — LORATADINE 10 MG/1
10 TABLET ORAL 2 TIMES DAILY
COMMUNITY

## 2024-01-31 ASSESSMENT — ENCOUNTER SYMPTOMS
TROUBLE SWALLOWING: 0
OCCASIONAL FEELINGS OF UNSTEADINESS: 0
NUMBNESS: 0
CHILLS: 0
NAUSEA: 0
WEAKNESS: 0
SHORTNESS OF BREATH: 0
DIARRHEA: 0
DIZZINESS: 0
LOSS OF SENSATION IN FEET: 0
FEVER: 0
VOMITING: 0
UNEXPECTED WEIGHT CHANGE: 0
DIFFICULTY URINATING: 0
ABDOMINAL PAIN: 0
BLOOD IN STOOL: 0
CONFUSION: 0
DEPRESSION: 0
WHEEZING: 0
COUGH: 0
LIGHT-HEADEDNESS: 0
DYSURIA: 0

## 2024-01-31 ASSESSMENT — PATIENT HEALTH QUESTIONNAIRE - PHQ9
2. FEELING DOWN, DEPRESSED OR HOPELESS: NOT AT ALL
SUM OF ALL RESPONSES TO PHQ9 QUESTIONS 1 AND 2: 0
1. LITTLE INTEREST OR PLEASURE IN DOING THINGS: NOT AT ALL

## 2024-01-31 NOTE — PATIENT INSTRUCTIONS
"Go to the ER/call 911 for: inability to \"reduce\" the hernia, inability to pass gas or have a bowel movement, bloody or black tarry stool, severe pain, bruising or redness over the hernia, nausea and vomiting, or other serious concern.    General Surgery  Dr. Adair Baltazar, Elvi Wright PA-C, Dr. Franky Brambila, Dr. Cathryn Encarnacion, Kiersten Berkowitz CNP, Dr. Ade Bradley ([place]) [phone]  Dr. Diane Perez ([place]) [phone]  Dr. Renato Moreno, Dr. Brian Mathews, Dr. Aracely Allen, Radha Sadler [facility] ([place]) [phone]       Please return or seek medical attention if symptoms persist, change, worsen, or return. For emergencies, call 9-1-1 or go to the nearest Emergency Room.    Avoid taking Biotin for a week prior to any blood tests, as it can interfere with certain results. Fasting for labs means 12 hours, nothing to eat or drink, except water and medications, unless directed otherwise.    For assistance with scheduling referrals or consultations, please call [phone]. For laboratory, radiology, and other tests, please call [phone] ([phone] for pediatrics). Please review prescription inserts and published information for possible adverse effects of all medications. Return after testing or consultation to review results and recommendations, if symptoms persist, change, worsen, or return, or if you have any question or concern. If you do not get results within 7-10 days, or you have any question or concern, please send a message, call the office ([phone]), or return to the office for a follow-up appointment. For non-emergencies, you may call the office. For emergencies, call 9-1-1 or go to the nearest Emergency Department. Please schedule additional appointment(s) to address concern(s) not addressed today.    In general, results are not released or discussed over the telephone, but at an appointment or via  [employer]. Results of tests done through [facility] are released via  [employer] " (see below).  [email or web address]  [facility] MyChart support line: [phone]

## 2024-01-31 NOTE — PROGRESS NOTES
"Subjective   Patient ID: Lv Quinteros is a 83 y.o. male who presents for Hernia (Pt presents c/o hernia/possible pulled muscle in groin x 3 week painful, swelling L foot, go over labs, check ears.BL).  HPI    2w ago squatting down to pick something up, got sudden sharp pain right groin. Tender, burning. Difficulty arising to standing. Not painful to walk. Laying down feels ok. \"Feels a little bulgey.\" Tried Tylenol, did help. Denies constipation, bloody or black tarry stool, diarrhea. Painful there to cough. Painful to lift heavy objects.    Plans to go to [facility] on 2-.    Review of Systems   Constitutional:  Negative for chills, fever and unexpected weight change.   HENT:  Negative for ear pain and trouble swallowing.    Respiratory:  Negative for cough, shortness of breath and wheezing.    Cardiovascular:  Negative for chest pain.   Gastrointestinal:  Negative for abdominal pain, blood in stool, diarrhea, nausea and vomiting.   Genitourinary:  Negative for difficulty urinating and dysuria.   Skin:  Negative for rash.   Neurological:  Negative for dizziness, syncope, weakness, light-headedness and numbness.   Psychiatric/Behavioral:  Negative for behavioral problems and confusion.          Objective   /65   Pulse 56   Ht 1.778 m (5' 10\")   Wt 75.5 kg (166 lb 6 oz)   SpO2 94%   BMI 23.87 kg/m²     Physical Exam  Vitals and nursing note reviewed.   Constitutional:       General: He is not in acute distress.     Appearance: Normal appearance.   HENT:      Head: Normocephalic and atraumatic.   Eyes:      General: No scleral icterus.     Extraocular Movements: Extraocular movements intact.      Conjunctiva/sclera: Conjunctivae normal.   Pulmonary:      Effort: Pulmonary effort is normal. No respiratory distress.   Abdominal:      Hernia: A hernia (right reducible direct inguinal hernia, very tender) is present.   Skin:     General: Skin is warm and dry.      Coloration: Skin is not jaundiced. "   Neurological:      Mental Status: He is alert and oriented to person, place, and time. Mental status is at baseline.   Psychiatric:         Behavior: Behavior normal.         Assessment/Plan   Problem List Items Addressed This Visit       Right inguinal hernia - Primary     Reducible. Follow-up with general surgery. Advised getting a surgeon's opinion and recommendations prior to making a trip to any area that might not have the facilities to treat a strangulated hernia/surgical emergency. Reviewed hernia reducibility, and signs/symptoms of hernia incarceration or strangulation.         Relevant Orders    Referral to General Surgery

## 2024-01-31 NOTE — ASSESSMENT & PLAN NOTE
Reducible. Follow-up with general surgery. Advised getting a surgeon's opinion and recommendations prior to making a trip to any area that might not have the facilities to treat a strangulated hernia/surgical emergency. Reviewed hernia reducibility, and signs/symptoms of hernia incarceration or strangulation.

## 2024-02-03 ENCOUNTER — HOSPITAL ENCOUNTER (EMERGENCY)
Facility: HOSPITAL | Age: 84
Discharge: HOME | End: 2024-02-03
Payer: MEDICARE

## 2024-02-03 VITALS
BODY MASS INDEX: 23.34 KG/M2 | HEIGHT: 70 IN | TEMPERATURE: 97.2 F | DIASTOLIC BLOOD PRESSURE: 72 MMHG | WEIGHT: 163 LBS | OXYGEN SATURATION: 95 % | RESPIRATION RATE: 16 BRPM | SYSTOLIC BLOOD PRESSURE: 150 MMHG | HEART RATE: 88 BPM

## 2024-02-03 DIAGNOSIS — K40.90 UNILATERAL INGUINAL HERNIA WITHOUT OBSTRUCTION OR GANGRENE, RECURRENCE NOT SPECIFIED: Primary | ICD-10-CM

## 2024-02-03 PROCEDURE — 99283 EMERGENCY DEPT VISIT LOW MDM: CPT

## 2024-02-03 PROCEDURE — 2500000001 HC RX 250 WO HCPCS SELF ADMINISTERED DRUGS (ALT 637 FOR MEDICARE OP): Performed by: PHYSICIAN ASSISTANT

## 2024-02-03 RX ORDER — IBUPROFEN 600 MG/1
600 TABLET ORAL ONCE
Status: COMPLETED | OUTPATIENT
Start: 2024-02-03 | End: 2024-02-03

## 2024-02-03 RX ORDER — IBUPROFEN 600 MG/1
600 TABLET ORAL EVERY 6 HOURS PRN
Qty: 28 TABLET | Refills: 0 | Status: SHIPPED | OUTPATIENT
Start: 2024-02-03 | End: 2024-02-10

## 2024-02-03 RX ADMIN — IBUPROFEN 600 MG: 600 TABLET, FILM COATED ORAL at 16:01

## 2024-02-03 ASSESSMENT — LIFESTYLE VARIABLES
EVER HAD A DRINK FIRST THING IN THE MORNING TO STEADY YOUR NERVES TO GET RID OF A HANGOVER: NO
HAVE PEOPLE ANNOYED YOU BY CRITICIZING YOUR DRINKING: NO
HAVE YOU EVER FELT YOU SHOULD CUT DOWN ON YOUR DRINKING: NO
EVER FELT BAD OR GUILTY ABOUT YOUR DRINKING: NO

## 2024-02-03 ASSESSMENT — PAIN SCALES - GENERAL
PAINLEVEL_OUTOF10: 10 - WORST POSSIBLE PAIN
PAINLEVEL_OUTOF10: 3

## 2024-02-03 ASSESSMENT — PAIN DESCRIPTION - PAIN TYPE: TYPE: ACUTE PAIN

## 2024-02-03 ASSESSMENT — PAIN DESCRIPTION - DESCRIPTORS: DESCRIPTORS: STABBING;BURNING

## 2024-02-03 ASSESSMENT — PAIN DESCRIPTION - ORIENTATION: ORIENTATION: RIGHT

## 2024-02-03 ASSESSMENT — PAIN DESCRIPTION - LOCATION
LOCATION: ABDOMEN
LOCATION: GROIN

## 2024-02-03 ASSESSMENT — COLUMBIA-SUICIDE SEVERITY RATING SCALE - C-SSRS
6. HAVE YOU EVER DONE ANYTHING, STARTED TO DO ANYTHING, OR PREPARED TO DO ANYTHING TO END YOUR LIFE?: NO
1. IN THE PAST MONTH, HAVE YOU WISHED YOU WERE DEAD OR WISHED YOU COULD GO TO SLEEP AND NOT WAKE UP?: NO

## 2024-02-03 ASSESSMENT — PAIN - FUNCTIONAL ASSESSMENT
PAIN_FUNCTIONAL_ASSESSMENT: 0-10
PAIN_FUNCTIONAL_ASSESSMENT: 0-10

## 2024-02-03 NOTE — ED PROVIDER NOTES
HPI   Chief Complaint   Patient presents with    Hernia     Right groin hernia for 3 weeks.  Was told by PCP to come in if it got larger or painful,       This is an 83-year-old male coming in for right inguinal hernia.  Patient states that it does reduce when he lays down.  He states he has had increased discomfort when he is up and walking.  He has an appointment with 2 surgeons on Tuesday both Dr. Bradley and Dr. Baltazar.  Patient reports that he is coming in because of the increased pain however also reports that he bought a hernia belt at the store yesterday however has not used it yet.  Patient does take ibuprofen and states it does help his discomfort.  Denies any other areas of pain or swelling.  He denies any episodes where it does not reduce.      History provided by:  Patient                      No data recorded                Patient History   Past Medical History:   Diagnosis Date    Actinic keratosis 05/09/2023    Cholelithiasis without obstruction 02/25/2020    Cough 10/04/2023    COVID-19 10/04/2023    Erythema intertrigo 05/09/2023    High prostate specific antigen (PSA) 08/21/2019    Inflamed seborrheic keratosis 05/09/2023    Melanocytic nevi of unspecified upper limb, including shoulder 05/09/2023    Sore throat 10/04/2023    Stroke (CMS/HCC)      History reviewed. No pertinent surgical history.  Family History   Problem Relation Name Age of Onset    Heart disease Mother      Allergies Parent      Heart disease Parent       Social History     Tobacco Use    Smoking status: Former     Types: Cigarettes    Smokeless tobacco: Never   Vaping Use    Vaping Use: Never used   Substance Use Topics    Alcohol use: Yes     Comment: 14a wk    Drug use: Never       Physical Exam   ED Triage Vitals [02/03/24 1512]   Temperature Heart Rate Respirations BP   36.3 °C (97.3 °F) 62 18 145/79      Pulse Ox Temp Source Heart Rate Source Patient Position   95 % Skin Monitor Sitting      BP Location FiO2 (%)     Left arm --        Physical Exam  Vitals and nursing note reviewed.   Constitutional:       General: He is not in acute distress.     Appearance: Normal appearance. He is not toxic-appearing.   HENT:      Head: Normocephalic and atraumatic.      Nose: Nose normal.      Mouth/Throat:      Mouth: Mucous membranes are moist.      Pharynx: Oropharynx is clear.   Eyes:      Extraocular Movements: Extraocular movements intact.      Conjunctiva/sclera: Conjunctivae normal.      Pupils: Pupils are equal, round, and reactive to light.   Cardiovascular:      Rate and Rhythm: Regular rhythm.      Pulses: Normal pulses.      Heart sounds: Normal heart sounds.   Pulmonary:      Effort: Pulmonary effort is normal. No respiratory distress.      Breath sounds: Normal breath sounds.   Abdominal:      General: Abdomen is flat. Bowel sounds are normal.      Palpations: Abdomen is soft.      Tenderness: There is no abdominal tenderness.      Hernia: A hernia is present. Hernia is present in the right inguinal area.   Genitourinary:     Comments: Right inguinal hernia that reduces when laying flat.  Musculoskeletal:         General: Normal range of motion.      Cervical back: Normal range of motion and neck supple.   Skin:     General: Skin is warm and dry.      Coloration: Skin is not jaundiced or pale.      Findings: No bruising.   Neurological:      General: No focal deficit present.      Mental Status: He is alert and oriented to person, place, and time. Mental status is at baseline.   Psychiatric:         Mood and Affect: Mood normal.         Behavior: Behavior normal.         ED Course & MDM   Diagnoses as of 02/03/24 1536   Unilateral inguinal hernia without obstruction or gangrene, recurrence not specified       Medical Decision Making  Summary:  Medical Decision Making:   Patient presented as described in HPI. Patient case including ROS, PE, and treatment and plan discussed with ED attending if attached as cosigner. Results from labs and or  imaging included below if completed. Lv Quinteros  is a 83 y.o. coming in for Patient presents with:  Hernia: Right groin hernia for 3 weeks.  Was told by PCP to come in if it got larger or painful,  .  During examination patient has noted hernia when standing however when he lays flat it does reduce that does not need reduction.  Patient will be given ibuprofen.  Will be given an Ace wrap in the meantime to help support the area however advise use his hernia belt.  He has an appointment with 2 surgeons on Tuesday both Dr. Bradley and Dr. Baltazar and advised follow-up with them.  Return with any episodes where the hernia does not reduce.  Patient agrees with treatment plan and states he will comply.      Patient was advised to follow up with PCP or recommended provider in 2-3 days for another evaluation and exam. I advised patient/guardian to return or go to closest emergency room immediately if symptoms change, get worse, new symptoms develop prior to follow up. If there is no improvement in symptoms in the next 24 hours they are advised to return for further evaluation and exam. I also explained the plan and treatment course. Patient/guardian is in agreement with plan, treatment course, and follow up and states verbally that they will comply.    Labs Reviewed - No data to display   No orders to display      Tests/Medications/Escalations of Care considered but not given: As in MDM    Patient care discussed with: N/A  Social Determinants affecting care: N/A    Final diagnosis and disposition as documented     Diagnoses as of 02/03/24 1544  Unilateral inguinal hernia without obstruction or gangrene, recurrence not specified       Homegoing. I discussed the differential; results and discharge plan with the patient and/or family/friend/caregiver if present.  I emphasized the importance of follow-up with the physician I referred them to in the timeframe recommended.  I explained reasons for the patient to return to the  Emergency Department. They agreed that if they feel their condition is worsening or if they have any other concern they should call 911 immediately for further assistance. I gave the patient an opportunity to ask all questions they had and answered all of them accordingly. They understand return precautions and discharge instructions. The patient and/or family/friend/caregiver expressed understanding verbally and that they would comply.     Disposition: Discharge      This note has been transcribed using voice recognition and may contain grammatical errors, misplaced words, incorrect words, incorrect phrases or other errors.         Procedure  Procedures     Franklin Luo PA-C  02/03/24 1546

## 2024-02-06 ENCOUNTER — OFFICE VISIT (OUTPATIENT)
Dept: SURGERY | Facility: CLINIC | Age: 84
End: 2024-02-06
Payer: MEDICARE

## 2024-02-06 ENCOUNTER — HOSPITAL ENCOUNTER (OUTPATIENT)
Facility: HOSPITAL | Age: 84
Setting detail: OUTPATIENT SURGERY
End: 2024-02-06
Attending: SURGERY | Admitting: SURGERY
Payer: MEDICARE

## 2024-02-06 VITALS
HEIGHT: 70 IN | SYSTOLIC BLOOD PRESSURE: 146 MMHG | HEART RATE: 56 BPM | OXYGEN SATURATION: 93 % | DIASTOLIC BLOOD PRESSURE: 65 MMHG | WEIGHT: 164.9 LBS | BODY MASS INDEX: 23.61 KG/M2

## 2024-02-06 DIAGNOSIS — K40.90 RIGHT INGUINAL HERNIA: Primary | ICD-10-CM

## 2024-02-06 PROCEDURE — 1125F AMNT PAIN NOTED PAIN PRSNT: CPT | Performed by: SURGERY

## 2024-02-06 PROCEDURE — 99203 OFFICE O/P NEW LOW 30 MIN: CPT | Performed by: SURGERY

## 2024-02-06 PROCEDURE — 3077F SYST BP >= 140 MM HG: CPT | Performed by: SURGERY

## 2024-02-06 PROCEDURE — 1160F RVW MEDS BY RX/DR IN RCRD: CPT | Performed by: SURGERY

## 2024-02-06 PROCEDURE — 1159F MED LIST DOCD IN RCRD: CPT | Performed by: SURGERY

## 2024-02-06 PROCEDURE — 1157F ADVNC CARE PLAN IN RCRD: CPT | Performed by: SURGERY

## 2024-02-06 PROCEDURE — 1036F TOBACCO NON-USER: CPT | Performed by: SURGERY

## 2024-02-06 PROCEDURE — 3078F DIAST BP <80 MM HG: CPT | Performed by: SURGERY

## 2024-02-06 RX ORDER — ENOXAPARIN SODIUM 300 MG/3ML
30 INJECTION INTRAVENOUS; SUBCUTANEOUS ONCE
Status: CANCELLED | OUTPATIENT
Start: 2024-02-06 | End: 2024-02-06

## 2024-02-06 NOTE — PROGRESS NOTES
General Surgery History and Physical    Referring Provider:  Chad Buchanan DO  No ref. provider found     Chief Complaint:  Chief Complaint   Patient presents with    New Patient Visit     Eval for hernia        History of Present Illness:  Lv Quinteros is a 83 y.o. male who presents with right groin bulge and pain.   Felt severe pain in the right groin and bulge about 3 weeks ago when he was squatting down to  a thing on the floor.   The bulge went back by itself when he lay down.   Pain on and off depending on position and activities   Currently, using belt to keep the bulge in. No pain when bulge is not out.   No obstruction symptoms.   Was seen in ED on 2/3 for pain. No imaging done.   Follow up today for surgical evaluation     Past Medical History:  Past Medical History:   Diagnosis Date    Actinic keratosis 05/09/2023    Cholelithiasis without obstruction 02/25/2020    Cough 10/04/2023    COVID-19 10/04/2023    Erythema intertrigo 05/09/2023    High prostate specific antigen (PSA) 08/21/2019    Inflamed seborrheic keratosis 05/09/2023    Melanocytic nevi of unspecified upper limb, including shoulder 05/09/2023    Sore throat 10/04/2023    Stroke (CMS/HCC)         Past Surgical History:  No past surgical history on file.     Medications:  Current Outpatient Medications   Medication Instructions    aspirin 81 mg, oral, 2 times daily    ibuprofen 600 mg, oral, Every 6 hours PRN    loratadine (CLARITIN) 10 mg, oral, Daily    metoprolol succinate XL (Toprol-XL) 100 mg 24 hr tablet Take 1 tablet (100 mg) by mouth. Take 1 tab in the morning, half tablet in the evening.    sulfacetamide sodium-sulfur 9-4 % cleanser 1 Application, Topical, Daily        Allergies:  Allergies   Allergen Reactions    Penicillins Hives, Rash, Shortness of breath and Unknown    Other Other     flushing, near-syncope    Statins-Hmg-Coa Reductase Inhibitors Unknown     muscle weakness on Lipitor; crestor ok        Family  History:  Family History   Problem Relation Name Age of Onset    Heart disease Mother      Allergies Parent      Heart disease Parent          Social History:  Social History     Socioeconomic History    Marital status:      Spouse name: Not on file    Number of children: Not on file    Years of education: Not on file    Highest education level: Not on file   Occupational History    Not on file   Tobacco Use    Smoking status: Former     Types: Cigarettes    Smokeless tobacco: Never   Vaping Use    Vaping Use: Never used   Substance and Sexual Activity    Alcohol use: Yes     Comment: 14a wk    Drug use: Never    Sexual activity: Not on file   Other Topics Concern    Not on file   Social History Narrative    Not on file     Social Determinants of Health     Financial Resource Strain: Not on file   Food Insecurity: Not on file   Transportation Needs: Not on file   Physical Activity: Not on file   Stress: Not on file   Social Connections: Not on file   Intimate Partner Violence: Not on file   Housing Stability: Not on file        Review of Systems:  A complete 12 point review of systems was performed. Please see scanned questionnaire and is otherwise negative except as noted in the history of present illness.    Vital Signs:  Vitals:    02/06/24 1308   BP: 146/65   Pulse: 56   SpO2: 93%      Body mass index is 23.66 kg/m².      Physical Exam:  General: No acute distress.   Neuro: Alert and oriented ×3. Follows commands.  Face: Atraumatic, no visible skin lesion.   Head: Atraumatic  Eyes: Pupils equal reactive to light. Extraocular motions intact.  Ears: Hears normal speaking voice.  Mouth, Nose, Throat: Mucous membranes moist.  Normal dentition.  Neck: Supple. No visible masses.  Breast: Not examined.   Chest: No appreciable scars or masses.   Heart/Pulse: Regular  Lung: Patent airway and no labored breath.   Vascular: Palpable radial pulses bilaterally.  Abdomen: Soft, NT,ND.   Right groin: reducible bulge  consistent with inguinal hernia. Moderate in size.   Rectal and Perianal: Not examined.   Genitourinary: Not examined.   Musculoskeletal: Moves all extremities.  Normal range of motion.  Extremities: No cyanosis. No edema.   Lymphatic: No palpable lymph nodes.  Skin: No rashes or lesions.  Psychological: Normal affect      Laboratory Values:  CBC:   Lab Results   Component Value Date    WBC 7.8 08/08/2023    RBC 5.64 08/08/2023    HGB 17.2 08/08/2023    HCT 51.9 08/08/2023     08/08/2023       RFP:   Lab Results   Component Value Date     08/08/2023    K 4.1 08/08/2023     08/08/2023    CO2 24 08/08/2023    BUN 14 08/08/2023    CREATININE 1.06 08/08/2023    CALCIUM 8.5 (L) 08/08/2023    MG 1.92 08/08/2023    PHOS 3.9 03/17/2021        LFTs:   Lab Results   Component Value Date    PROT 5.8 (L) 10/17/2023    ALBUMIN 4.0 10/17/2023    BILITOT 1.0 10/17/2023    BILIDIR 0.2 10/17/2023    ALKPHOS 42 10/17/2023    AST 18 10/17/2023    ALT 21 10/17/2023            Imaging:  I have personally reviewed the images and the radiologist's report.  No results found.      Assessment:  This is a 83 y.o. male who presents with reducible right inguinal hernia: moderate in size      Plan:  I discussed with the patient about treatment options and recommended laparoscopic hernia repair. Robot assisted laparoscopic hernia repair will be performed if robot is available on surgery day.   I explained to the patient about the risks and benefits of the surgery. Patient expressed understanding that the risks of surgery include but are not limited to bleeding, infection, possible intra-abdominal viscus injury which may require additional surgery to fix it, postoperative pain/seroma, hernia recurrence and mesh related complications which may require additional/extensive surgery to fix it, possibility for open surgery, risks of anesthesia, mortality in rare/extreme situations. Patient agreed to proceed.    Due to his age and  comorbidities including history CABG, Afib (was on eliquis, patient stopped it on his own due to skin rash), stroke and others, he will need PAT. I also advised him to contact his cardiologist office to see if he needs to be on anticoagulation or if other alternative meds to take.     Hernia surgery could be done electively. He will decide when to have surgery. He is planning on a trip to [facility]. He was instructed to call or ED if sign of incarceration, obstruction or strangulation.       Ade Bradley MD [facility]  General Surgery  Office: [phone]  Fax:     [phone]  2:14 PM   02/06/24

## 2024-02-07 ENCOUNTER — OFFICE VISIT (OUTPATIENT)
Dept: SURGERY | Facility: CLINIC | Age: 84
End: 2024-02-07
Payer: MEDICARE

## 2024-02-07 VITALS
OXYGEN SATURATION: 92 % | HEART RATE: 54 BPM | BODY MASS INDEX: 23.24 KG/M2 | SYSTOLIC BLOOD PRESSURE: 161 MMHG | DIASTOLIC BLOOD PRESSURE: 77 MMHG | WEIGHT: 162 LBS

## 2024-02-07 DIAGNOSIS — K40.90 RIGHT INGUINAL HERNIA: ICD-10-CM

## 2024-02-07 PROCEDURE — 3077F SYST BP >= 140 MM HG: CPT | Performed by: SURGERY

## 2024-02-07 PROCEDURE — 1036F TOBACCO NON-USER: CPT | Performed by: SURGERY

## 2024-02-07 PROCEDURE — 3078F DIAST BP <80 MM HG: CPT | Performed by: SURGERY

## 2024-02-07 PROCEDURE — 1160F RVW MEDS BY RX/DR IN RCRD: CPT | Performed by: SURGERY

## 2024-02-07 PROCEDURE — 1157F ADVNC CARE PLAN IN RCRD: CPT | Performed by: SURGERY

## 2024-02-07 PROCEDURE — 99214 OFFICE O/P EST MOD 30 MIN: CPT | Performed by: SURGERY

## 2024-02-07 PROCEDURE — 1125F AMNT PAIN NOTED PAIN PRSNT: CPT | Performed by: SURGERY

## 2024-02-07 PROCEDURE — 1159F MED LIST DOCD IN RCRD: CPT | Performed by: SURGERY

## 2024-02-07 NOTE — PROGRESS NOTES
General Surgery History and Physical    Referring Provider:  DO Dewayne Mckeon Timothy B, DO     Chief Complaint:  Chief Complaint   Patient presents with    New Patient Visit     Right inguinal hernia/second opinion        History of Present Illness:  This is a 83 y.o. male who presents with concerns for right inguinal hernia.  He reports that 2 weeks ago he has had sudden onset of pain in the right groin.  He reports that as time is gone on he has noticed a bulge in the right groin.  He reports that strenuous activity makes the pain worse.  He reports that lying down and relaxing makes it better.  He denies any issues in the left side.  He denies any fevers, chills, nausea, or vomiting.    Past Medical History:  Stroke  Coronary artery disease  Actinic keratosis  Cholelithiasis  Benign prostatic hypertrophy with elevated PSA    Past Surgical History:  Minimally invasive coronary artery bypass grafting  Colonoscopy  Esophagogastroduodenoscopy  Cataract replacement    Medications:  Aspirin  Metoprolol  Ibuprofen  Loratadine    Allergies:  Penicillins  Statins    Family History:  Heart disease  Hypertension    Social History:  .  Retired.  Quit smoking in 1980.  Drinks 1 alcoholic beverage a day.  Denies illicits.       Review of Systems:  A complete 12 point review of systems was performed and is negative except as noted in the history of present illness.      Vital Signs:  Vitals:    02/07/24 0936   BP: 161/77   Pulse: 54   SpO2: 92%          Physical Exam:  General: No acute distress. Sitting up in bed.   Neuro: Alert and oriented ×3. Follows commands.  Head: Atraumatic  Eyes: Pupils equal reactive to light. Extraocular motions intact.  Ears: Hears normal speaking voice.  Mouth, Nose, Throat: Mucous membranes moist.  Missing a couple teeth.  Neck: Supple. No appreciable masses.  Chest: No crepitus.  Left upper chest scar  Heart: Regular rate and rhythm.  Lung: Clear to auscultation  bilaterally.  Vascular: No carotid bruits.  Palpable radial pulses bilaterally.  Abdomen: Soft. Nondistended. Nontender.  Small right inguinal hernia.  Musculoskeletal: Moves all extremities.  Normal range of motion.  Lymphatic: No palpable lymph nodes.  Skin: No rashes or lesions.  Psychological: Normal affect      Laboratory Values:  None      Imaging:    None      Assessment:  This is a 83 y.o. male who presents with a symptomatic right inguinal hernia.  We discussed at length that I recommend a laparoscopic right inguinal hernia repair.      Plan:  -- We will plan for a laparoscopic right inguinal hernia repair with mesh.  -- We will plan for surgery to be performed as an outpatient.  -- We will obtain preoperative labwork including CBC, RFP, Magnesium, LFTs, INR, and Type and Screen.  -- We will obtain a preoperative chest X-ray and EKG.  -- We will obtain Preadmission Testing (PAT).  -- We will apply Dermabond, so the patient may shower the day after surgery.  No swimming or tub soaks for 2 weeks post-operatively.  -- No heavy lifting for 1 week after surgery.        Salinas Baltazar MD  General Surgery  Office: [phone]  Fax:     [phone]  9:47 AM   02/07/24

## 2024-03-25 ENCOUNTER — OFFICE VISIT (OUTPATIENT)
Dept: CARDIOLOGY | Facility: HOSPITAL | Age: 84
End: 2024-03-25
Payer: MEDICARE

## 2024-03-25 VITALS
BODY MASS INDEX: 23.57 KG/M2 | HEART RATE: 67 BPM | WEIGHT: 164.24 LBS | OXYGEN SATURATION: 94 % | SYSTOLIC BLOOD PRESSURE: 120 MMHG | DIASTOLIC BLOOD PRESSURE: 73 MMHG

## 2024-03-25 DIAGNOSIS — I48.91 ATRIAL FIBRILLATION, UNSPECIFIED TYPE (MULTI): ICD-10-CM

## 2024-03-25 DIAGNOSIS — Z01.810 PREOP CARDIOVASCULAR EXAM: Primary | ICD-10-CM

## 2024-03-25 DIAGNOSIS — R09.89 CAROTID BRUIT, UNSPECIFIED LATERALITY: ICD-10-CM

## 2024-03-25 PROCEDURE — 1160F RVW MEDS BY RX/DR IN RCRD: CPT | Performed by: NURSE PRACTITIONER

## 2024-03-25 PROCEDURE — 93005 ELECTROCARDIOGRAM TRACING: CPT | Performed by: NURSE PRACTITIONER

## 2024-03-25 PROCEDURE — 1036F TOBACCO NON-USER: CPT | Performed by: NURSE PRACTITIONER

## 2024-03-25 PROCEDURE — 93010 ELECTROCARDIOGRAM REPORT: CPT | Performed by: INTERNAL MEDICINE

## 2024-03-25 PROCEDURE — 3078F DIAST BP <80 MM HG: CPT | Performed by: NURSE PRACTITIONER

## 2024-03-25 PROCEDURE — 1157F ADVNC CARE PLAN IN RCRD: CPT | Performed by: NURSE PRACTITIONER

## 2024-03-25 PROCEDURE — 3074F SYST BP LT 130 MM HG: CPT | Performed by: NURSE PRACTITIONER

## 2024-03-25 PROCEDURE — 99203 OFFICE O/P NEW LOW 30 MIN: CPT | Performed by: NURSE PRACTITIONER

## 2024-03-25 PROCEDURE — 99213 OFFICE O/P EST LOW 20 MIN: CPT | Performed by: NURSE PRACTITIONER

## 2024-03-25 PROCEDURE — 1159F MED LIST DOCD IN RCRD: CPT | Performed by: NURSE PRACTITIONER

## 2024-03-25 ASSESSMENT — ENCOUNTER SYMPTOMS: DEPRESSION: 0

## 2024-03-25 NOTE — PROGRESS NOTES
Referred by Dr. Archibald for Pre-op Clearance (Laparoscopic right inguinal hernia repair with mesh.)     History Of Present Illness:   Dear Dr. Buchanan,     I had the pleasure of meeting Mr. Quinteros today at [facility] for perioperative cardiac clearance. The patient is seen in collaboration with Dr. Devi.  Mr. Quinteros is a very pleasant 83 year old gentleman with a history of PAF, CVA, right inguinal hernia, CAD s/p CABG x1 (1997) and HLD, former history of smoking. Family history of CAD. He is going to schedule hernia surgery. He denies chest pain, shortness of breath or heart palpitations. He has noticed some swelling in his left ankle. States he is not on anticoagulation due to a rash he had from the Eliquis. States he is active on a regular basis,is able to walk up two flights of stairs without dyspnea.     Review of Systems   Constitutional: Negative.   HENT: Negative.     Eyes: Negative.    Cardiovascular: Negative.    Respiratory: Negative.     Endocrine: Negative.    Hematologic/Lymphatic: Negative.    Skin: Negative.    Musculoskeletal:  Positive for myalgias.   Gastrointestinal:  Positive for abdominal pain.   Neurological: Negative.    Psychiatric/Behavioral: Negative.          Past Medical History:  He has a past medical history of Actinic keratosis (05/09/2023), Arrhythmia, Arthritis, Cholelithiasis without obstruction (02/25/2020), Cough (10/04/2023), COVID-19 (10/04/2023), Diabetes mellitus (CMS/[facility]), Erythema intertrigo (05/09/2023), High prostate specific antigen (PSA) (08/21/2019), Hypertension, Inflamed seborrheic keratosis (05/09/2023), Melanocytic nevi of unspecified upper limb, including shoulder (05/09/2023), Sore throat (10/04/2023), and Stroke (CMS/[facility]).    Past Surgical History:  He has no past surgical history on file.      Social History:  He reports that he has quit smoking. His smoking use included cigarettes. He has never used smokeless tobacco. He reports  current alcohol use of about 7.0 standard drinks of alcohol per week. He reports that he does not use drugs.    Family History:  Family History   Problem Relation Name Age of Onset    Heart disease Mother      Allergies Parent      Heart disease Parent          Allergies:  Penicillins, Other, and Statins-hmg-coa reductase inhibitors    Outpatient Medications:  Current Outpatient Medications   Medication Instructions    aspirin 81 mg, oral, 2 times daily    loratadine (CLARITIN) 10 mg, oral, Daily    metoprolol succinate XL (Toprol-XL) 100 mg 24 hr tablet Take 1 tablet (100 mg) by mouth. Take 1 tab in the morning, half tablet in the evening.    sulfacetamide sodium-sulfur 9-4 % cleanser 1 Application, Topical, Daily        Last Recorded Vitals:  Vitals:    03/25/24 1513   BP: 120/73   Pulse: 67   SpO2: 94%   Weight: 74.5 kg (164 lb 3.9 oz)       Physical Exam:  Physical Exam  Vitals reviewed.   HENT:      Head: Normocephalic.      Nose: Nose normal.   Eyes:      Pupils: Pupils are equal, round, and reactive to light.   Cardiovascular:      Rate and Rhythm: Normal rate and regular rhythm.   Pulmonary:      Effort: Pulmonary effort is normal.      Breath sounds: Normal breath sounds.   Abdominal:      General: Abdomen is flat.      Palpations: Abdomen is soft.   Musculoskeletal:         General: Normal range of motion.      Cervical back: Normal range of motion.   Skin:     General: Skin is warm and dry.   Neurological:      General: No focal deficit present.      Mental Status: He is alert and oriented to person, place, and time.   Psychiatric:         Mood and Affect: Mood normal.            Last Labs:  CBC -  Lab Results   Component Value Date    WBC 7.8 08/08/2023    HGB 17.2 08/08/2023    HCT 51.9 08/08/2023    MCV 92 08/08/2023     08/08/2023       CMP -  Lab Results   Component Value Date    CALCIUM 8.5 (L) 08/08/2023    PHOS 3.9 03/17/2021    PROT 5.8 (L) 10/17/2023    ALBUMIN 4.0 10/17/2023    AST 18  10/17/2023    ALT 21 10/17/2023    ALKPHOS 42 10/17/2023    BILITOT 1.0 10/17/2023       LIPID PANEL -   Lab Results   Component Value Date    CHOL 185 10/17/2023    TRIG 116 10/17/2023    HDL 43.8 10/17/2023    CHHDL 4.2 10/17/2023    LDLF 126 (H) 03/12/2021    VLDL 23 10/17/2023    NHDL 141 10/17/2023       RENAL FUNCTION PANEL -   Lab Results   Component Value Date    GLUCOSE 83 08/08/2023     08/08/2023    K 4.1 08/08/2023     08/08/2023    CO2 24 08/08/2023    ANIONGAP 15 08/08/2023    BUN 14 08/08/2023    CREATININE 1.06 08/08/2023    GFRMALE 70 08/08/2023    CALCIUM 8.5 (L) 08/08/2023    PHOS 3.9 03/17/2021    ALBUMIN 4.0 10/17/2023        Lab Results   Component Value Date     (H) 03/12/2021    HGBA1C 5.1 10/17/2023       Last Cardiology Tests:  ECG:  ECG 12 lead (Clinic Performed) 03/25/2024 independently reviewed showed sinus rhythm with RBBB heart rate 60 bpm     Echo:  Echo 3/12/2021   1. The left ventricular systolic function is normal with a 65-70% estimated ejection fraction.   2. Spectral Doppler shows an impaired relaxation pattern of left ventricular diastolic filling.   3. The left atrium is moderately dilated.   4. There is mild aortic valve regurgitation.   5. There is no evidence of a patent foramen ovale.   6. There is moderate dilatation of the ascending aorta.  Ejection Fractions:  LVEF 65-70%  Cath:    Stress Test:  NUCLEAR STRESS TEST 07/06/2022  1. SPECT Perfusion Study: Normal.    2. There is no scintigraphic evidence for inducible ischemia.    3. No evidence of scarred myocardium.    4. Left ventricle is normal in size. The left ventricle systolic   function is normal.    5. Right ventricle is normal in size. The right ventricle systolic   function is normal.    6. This is a low risk scan.             Gated Stress FBP Gated Rest FBP    LVEF % 67             Cardiac Imaging        Assessment/Plan   Mr. Quinteros is a very pleasant 83 year old gentleman with a history of  CAD s/p CABG x1 (1997), HLD, PAF, and a CVA, he is here for perioperative cardiac clearance for hernia surgery. He is able to walk up a flight of stairs without dyspnea. EKG shows a sinus rhythm. He is low cardiac risk for surgery and may proceed to the OR without further cardiac testing. We did discuss that importance of starting Eliquis after surgery, CHADS vasc score of 5 places him at high risk. Heart rate and blood pressure is well controlled today.     Plan   -call with any questions   -continue the Metoprolol   -low cardiac risk for surgery   -start Eliquis 5 mg twice a day after surgery   -carotid ultrasound   -follow up in six months     I appreciate the opportunity to participate in the patient's care, please call with any questions.         Skye Mcconnell, NUNO-CNP

## 2024-03-25 NOTE — PATIENT INSTRUCTIONS
CALL WITH ANY QUESTIONS   START ELIQUIS 5 MG TWICE A DAY AFTER SURGERY   CLEARED FOR SURGERY   CAROTID ULTRASOUND

## 2024-03-27 ASSESSMENT — ENCOUNTER SYMPTOMS
CARDIOVASCULAR NEGATIVE: 1
RESPIRATORY NEGATIVE: 1
NEUROLOGICAL NEGATIVE: 1
ENDOCRINE NEGATIVE: 1
ABDOMINAL PAIN: 1
HEMATOLOGIC/LYMPHATIC NEGATIVE: 1
PSYCHIATRIC NEGATIVE: 1
EYES NEGATIVE: 1
MYALGIAS: 1
CONSTITUTIONAL NEGATIVE: 1

## 2024-04-01 ENCOUNTER — HOSPITAL ENCOUNTER (OUTPATIENT)
Dept: VASCULAR MEDICINE | Facility: HOSPITAL | Age: 84
Discharge: HOME | End: 2024-04-01
Payer: MEDICARE

## 2024-04-01 DIAGNOSIS — R09.89 CAROTID BRUIT, UNSPECIFIED LATERALITY: ICD-10-CM

## 2024-04-01 PROCEDURE — 93880 EXTRACRANIAL BILAT STUDY: CPT

## 2024-04-01 PROCEDURE — 93880 EXTRACRANIAL BILAT STUDY: CPT | Performed by: STUDENT IN AN ORGANIZED HEALTH CARE EDUCATION/TRAINING PROGRAM

## 2024-04-04 ENCOUNTER — TELEPHONE (OUTPATIENT)
Dept: CARDIOLOGY | Facility: HOSPITAL | Age: 84
End: 2024-04-04
Payer: MEDICARE

## 2024-04-04 NOTE — TELEPHONE ENCOUNTER
----- Message from GRETCHEN Bell sent at 4/3/2024  7:30 PM EDT -----  Please call and let him know his carotid ultrasound is normal   Thanks   ----- Message -----  From: Anthony Lopezo - Cardiology Results In  Sent: 4/3/2024  12:33 PM EDT  To: GRETCHEN Bell

## 2024-04-10 ENCOUNTER — OFFICE VISIT (OUTPATIENT)
Dept: SURGERY | Facility: CLINIC | Age: 84
End: 2024-04-10
Payer: MEDICARE

## 2024-04-10 DIAGNOSIS — K40.90 RIGHT INGUINAL HERNIA: Primary | ICD-10-CM

## 2024-04-10 PROCEDURE — 1159F MED LIST DOCD IN RCRD: CPT | Performed by: SURGERY

## 2024-04-10 PROCEDURE — 1157F ADVNC CARE PLAN IN RCRD: CPT | Performed by: SURGERY

## 2024-04-10 PROCEDURE — 99213 OFFICE O/P EST LOW 20 MIN: CPT | Performed by: SURGERY

## 2024-04-10 PROCEDURE — 1160F RVW MEDS BY RX/DR IN RCRD: CPT | Performed by: SURGERY

## 2024-04-10 NOTE — PROGRESS NOTES
General Surgery Follow-Up Note    Referring Provider:   Chad Buchanan DO      Chief Complaint:  Inguinal hernia    History of Present Illness:  This is a 83 y.o. male who presents for a right inguinal hernia.  He was last seen 2 months ago.  He reports that since her last visit, he has had no changes in his symptoms in the right groin.  He denies any issues on the left side.  However, he does wish to discuss potential surgical repair again, and he has seen another surgeon in the region who performs the shoulders repair in the interim.      Vitals:   There were no vitals filed for this visit.      Physical Exam:  General: No acute distress. Sitting up in bed.   Neuro: Alert and oriented ×3. Follows commands.  Head: Atraumatic  Eyes: Pupils equal reactive to light. Extraocular motions intact.  Ears: Hears normal speaking voice.  Mouth, Nose, Throat: Mucous membranes moist.  Normal dentition.  Neck: Supple. No appreciable masses.  Chest: No crepitus.  No appreciable scars.  Heart: Regular rate and rhythm.  Lung: Clear to auscultation bilaterally.  Vascular: No carotid bruits.  Palpable radial pulses bilaterally.  Abdomen: Soft. Nondistended. Nontender.  Right inguinal hernia  Musculoskeletal: Moves all extremities.  Normal range of motion.  Lymphatic: No palpable lymph nodes.  Skin: No rashes or lesions.  Psychological: Normal affect    Labs:  None      Imaging:   None      Assessment:  This is a 83 y.o.-year-old male who presents for discussion of his right inguinal hernia.  We discussed indications for surgical repair, risks of surgery, risks of the mesh, risks of no mesh, and risks of recurrences.      Plan:  -- We will plan for a laparoscopic right inguinal hernia repair with mesh.  -- He will call to schedule if he desires.  -- We will plan for surgery to be performed as an outpatient.  -- We will obtain preoperative labwork including CBC, RFP, Magnesium, LFTs, INR, and Type and Screen.  -- We will obtain a  preoperative chest X-ray and EKG.  -- We will obtain Preadmission Testing (PAT) for his cardiac, neurologic, and arthritic history.  -- We will apply Dermabond, so the patient may shower the day after surgery.  No swimming or tub soaks for 2 weeks post-operatively.  -- No heavy lifting for 1 week after surgery.      Salinas Baltazar MD  General Surgery  Office: [phone]  Fax: [phone]  12:19 PM  04/10/24        Past Medical History:  Past Medical History:   Diagnosis Date    Actinic keratosis 05/09/2023    Arrhythmia     PAF    Arthritis     Cholelithiasis without obstruction 02/25/2020    Cough 10/04/2023    COVID-19 10/04/2023    Diabetes mellitus (CMS/HCC)     Erythema intertrigo 05/09/2023    High prostate specific antigen (PSA) 08/21/2019    Hypertension     Inflamed seborrheic keratosis 05/09/2023    Melanocytic nevi of unspecified upper limb, including shoulder 05/09/2023    Sore throat 10/04/2023    Stroke (CMS/HCC)     approx 3 yrs ago        Past Surgical History:  No past surgical history on file.     Medications:  Current Outpatient Medications   Medication Instructions    apixaban (ELIQUIS) 5 mg, oral, 2 times daily    aspirin 81 mg, oral, 2 times daily    loratadine (CLARITIN) 10 mg, oral, Daily    metoprolol succinate XL (Toprol-XL) 100 mg 24 hr tablet Take 1 tablet (100 mg) by mouth. Take 1 tab in the morning, half tablet in the evening.    sulfacetamide sodium-sulfur 9-4 % cleanser 1 Application, Topical, Daily        Allergies:  Allergies   Allergen Reactions    Penicillins Hives, Rash, Shortness of breath and Unknown    Other Other     flushing, near-syncope    Statins-Hmg-Coa Reductase Inhibitors Unknown     muscle weakness on Lipitor; crestor ok        Family History:  Family History   Problem Relation Name Age of Onset    Heart disease Mother      Allergies Parent      Heart disease Parent          Social History:  Social History     Socioeconomic History    Marital status:       Spouse name: Not on file    Number of children: Not on file    Years of education: Not on file    Highest education level: Not on file   Occupational History    Not on file   Tobacco Use    Smoking status: Former     Types: Cigarettes    Smokeless tobacco: Never   Vaping Use    Vaping status: Never Used   Substance and Sexual Activity    Alcohol use: Yes     Alcohol/week: 7.0 standard drinks of alcohol     Types: 7 Cans of beer per week    Drug use: Never    Sexual activity: Not on file   Other Topics Concern    Not on file   Social History Narrative    Not on file     Social Determinants of Health     Financial Resource Strain: Not on file   Food Insecurity: Not on file   Transportation Needs: Not on file   Physical Activity: Not on file   Stress: Not on file   Social Connections: Not on file   Intimate Partner Violence: Not on file   Housing Stability: Not on file        Review of Systems:  A complete 12 point review of systems was performed and is negative except as noted in the history of present illness.

## 2024-04-13 ENCOUNTER — APPOINTMENT (OUTPATIENT)
Dept: RADIOLOGY | Facility: HOSPITAL | Age: 84
End: 2024-04-13
Payer: MEDICARE

## 2024-04-13 ENCOUNTER — HOSPITAL ENCOUNTER (OUTPATIENT)
Facility: HOSPITAL | Age: 84
Setting detail: OBSERVATION
Discharge: HOME | End: 2024-04-14
Attending: STUDENT IN AN ORGANIZED HEALTH CARE EDUCATION/TRAINING PROGRAM | Admitting: INTERNAL MEDICINE
Payer: MEDICARE

## 2024-04-13 DIAGNOSIS — R10.13 ABDOMINAL PAIN, EPIGASTRIC: Primary | ICD-10-CM

## 2024-04-13 DIAGNOSIS — R10.13 EPIGASTRIC PAIN: ICD-10-CM

## 2024-04-13 LAB
ALBUMIN SERPL BCP-MCNC: 4 G/DL (ref 3.4–5)
ALP SERPL-CCNC: 38 U/L (ref 33–136)
ALT SERPL W P-5'-P-CCNC: 18 U/L (ref 10–52)
ANION GAP SERPL CALC-SCNC: 14 MMOL/L (ref 10–20)
AST SERPL W P-5'-P-CCNC: 19 U/L (ref 9–39)
ATRIAL RATE: 60 BPM
BASOPHILS # BLD AUTO: 0.06 X10*3/UL (ref 0–0.1)
BASOPHILS NFR BLD AUTO: 0.6 %
BILIRUB SERPL-MCNC: 0.7 MG/DL (ref 0–1.2)
BUN SERPL-MCNC: 17 MG/DL (ref 6–23)
CALCIUM SERPL-MCNC: 8.7 MG/DL (ref 8.6–10.3)
CARDIAC TROPONIN I PNL SERPL HS: 8 NG/L (ref 0–20)
CHLORIDE SERPL-SCNC: 103 MMOL/L (ref 98–107)
CO2 SERPL-SCNC: 24 MMOL/L (ref 21–32)
CREAT SERPL-MCNC: 1.05 MG/DL (ref 0.5–1.3)
EGFRCR SERPLBLD CKD-EPI 2021: 71 ML/MIN/1.73M*2
EOSINOPHIL # BLD AUTO: 0.31 X10*3/UL (ref 0–0.4)
EOSINOPHIL NFR BLD AUTO: 3 %
ERYTHROCYTE [DISTWIDTH] IN BLOOD BY AUTOMATED COUNT: 12.8 % (ref 11.5–14.5)
GLUCOSE SERPL-MCNC: 130 MG/DL (ref 74–99)
HCT VFR BLD AUTO: 47.9 % (ref 41–52)
HGB BLD-MCNC: 16.5 G/DL (ref 13.5–17.5)
IMM GRANULOCYTES # BLD AUTO: 0.03 X10*3/UL (ref 0–0.5)
IMM GRANULOCYTES NFR BLD AUTO: 0.3 % (ref 0–0.9)
LIPASE SERPL-CCNC: 46 U/L (ref 9–82)
LYMPHOCYTES # BLD AUTO: 1.82 X10*3/UL (ref 0.8–3)
LYMPHOCYTES NFR BLD AUTO: 17.4 %
MCH RBC QN AUTO: 31.9 PG (ref 26–34)
MCHC RBC AUTO-ENTMCNC: 34.4 G/DL (ref 32–36)
MCV RBC AUTO: 93 FL (ref 80–100)
MONOCYTES # BLD AUTO: 0.75 X10*3/UL (ref 0.05–0.8)
MONOCYTES NFR BLD AUTO: 7.2 %
NEUTROPHILS # BLD AUTO: 7.49 X10*3/UL (ref 1.6–5.5)
NEUTROPHILS NFR BLD AUTO: 71.5 %
NRBC BLD-RTO: 0 /100 WBCS (ref 0–0)
P AXIS: 51 DEGREES
P OFFSET: 160 MS
P ONSET: 107 MS
PLATELET # BLD AUTO: 162 X10*3/UL (ref 150–450)
POTASSIUM SERPL-SCNC: 3.5 MMOL/L (ref 3.5–5.3)
PR INTERVAL: 206 MS
PROT SERPL-MCNC: 6.4 G/DL (ref 6.4–8.2)
Q ONSET: 210 MS
QRS COUNT: 10 BEATS
QRS DURATION: 148 MS
QT INTERVAL: 436 MS
QTC CALCULATION(BAZETT): 436 MS
QTC FREDERICIA: 436 MS
R AXIS: -62 DEGREES
RBC # BLD AUTO: 5.18 X10*6/UL (ref 4.5–5.9)
SODIUM SERPL-SCNC: 137 MMOL/L (ref 136–145)
T AXIS: 4 DEGREES
T OFFSET: 428 MS
VENTRICULAR RATE: 60 BPM
WBC # BLD AUTO: 10.5 X10*3/UL (ref 4.4–11.3)

## 2024-04-13 PROCEDURE — 84484 ASSAY OF TROPONIN QUANT: CPT | Performed by: NURSE PRACTITIONER

## 2024-04-13 PROCEDURE — 80053 COMPREHEN METABOLIC PANEL: CPT | Performed by: NURSE PRACTITIONER

## 2024-04-13 PROCEDURE — 85025 COMPLETE CBC W/AUTO DIFF WBC: CPT | Performed by: NURSE PRACTITIONER

## 2024-04-13 PROCEDURE — 71045 X-RAY EXAM CHEST 1 VIEW: CPT

## 2024-04-13 PROCEDURE — 2500000004 HC RX 250 GENERAL PHARMACY W/ HCPCS (ALT 636 FOR OP/ED): Performed by: NURSE PRACTITIONER

## 2024-04-13 PROCEDURE — 83690 ASSAY OF LIPASE: CPT | Performed by: NURSE PRACTITIONER

## 2024-04-13 PROCEDURE — 71045 X-RAY EXAM CHEST 1 VIEW: CPT | Performed by: RADIOLOGY

## 2024-04-13 PROCEDURE — 99285 EMERGENCY DEPT VISIT HI MDM: CPT

## 2024-04-13 PROCEDURE — 96374 THER/PROPH/DIAG INJ IV PUSH: CPT

## 2024-04-13 PROCEDURE — 36415 COLL VENOUS BLD VENIPUNCTURE: CPT | Performed by: NURSE PRACTITIONER

## 2024-04-13 RX ORDER — FAMOTIDINE 10 MG/ML
20 INJECTION INTRAVENOUS ONCE
Status: COMPLETED | OUTPATIENT
Start: 2024-04-13 | End: 2024-04-13

## 2024-04-13 RX ADMIN — FAMOTIDINE 20 MG: 10 INJECTION, SOLUTION INTRAVENOUS at 23:15

## 2024-04-13 ASSESSMENT — LIFESTYLE VARIABLES
HAVE YOU EVER FELT YOU SHOULD CUT DOWN ON YOUR DRINKING: NO
EVER HAD A DRINK FIRST THING IN THE MORNING TO STEADY YOUR NERVES TO GET RID OF A HANGOVER: NO
HAVE PEOPLE ANNOYED YOU BY CRITICIZING YOUR DRINKING: NO
TOTAL SCORE: 0
EVER FELT BAD OR GUILTY ABOUT YOUR DRINKING: NO

## 2024-04-13 ASSESSMENT — PAIN DESCRIPTION - LOCATION: LOCATION: ABDOMEN

## 2024-04-13 ASSESSMENT — PAIN DESCRIPTION - DESCRIPTORS: DESCRIPTORS: BURNING

## 2024-04-13 ASSESSMENT — PAIN DESCRIPTION - ORIENTATION: ORIENTATION: MID;UPPER

## 2024-04-13 ASSESSMENT — COLUMBIA-SUICIDE SEVERITY RATING SCALE - C-SSRS
2. HAVE YOU ACTUALLY HAD ANY THOUGHTS OF KILLING YOURSELF?: NO
6. HAVE YOU EVER DONE ANYTHING, STARTED TO DO ANYTHING, OR PREPARED TO DO ANYTHING TO END YOUR LIFE?: NO
1. IN THE PAST MONTH, HAVE YOU WISHED YOU WERE DEAD OR WISHED YOU COULD GO TO SLEEP AND NOT WAKE UP?: NO

## 2024-04-13 ASSESSMENT — PAIN - FUNCTIONAL ASSESSMENT: PAIN_FUNCTIONAL_ASSESSMENT: 0-10

## 2024-04-13 ASSESSMENT — PAIN DESCRIPTION - PAIN TYPE: TYPE: ACUTE PAIN

## 2024-04-13 ASSESSMENT — PAIN SCALES - GENERAL: PAINLEVEL_OUTOF10: 9

## 2024-04-14 ENCOUNTER — APPOINTMENT (OUTPATIENT)
Dept: RADIOLOGY | Facility: HOSPITAL | Age: 84
End: 2024-04-14
Payer: MEDICARE

## 2024-04-14 VITALS
HEIGHT: 70 IN | DIASTOLIC BLOOD PRESSURE: 65 MMHG | HEART RATE: 56 BPM | TEMPERATURE: 97.7 F | BODY MASS INDEX: 22.6 KG/M2 | OXYGEN SATURATION: 95 % | SYSTOLIC BLOOD PRESSURE: 156 MMHG | RESPIRATION RATE: 18 BRPM | WEIGHT: 157.85 LBS

## 2024-04-14 PROBLEM — R10.13 EPIGASTRIC PAIN: Status: ACTIVE | Noted: 2024-04-14

## 2024-04-14 LAB
ABO GROUP (TYPE) IN BLOOD: NORMAL
ANION GAP SERPL CALC-SCNC: 11 MMOL/L (ref 10–20)
ANTIBODY SCREEN: NORMAL
APPEARANCE UR: CLEAR
BILIRUB UR STRIP.AUTO-MCNC: NEGATIVE MG/DL
BUN SERPL-MCNC: 14 MG/DL (ref 6–23)
CALCIUM SERPL-MCNC: 8.2 MG/DL (ref 8.6–10.3)
CARDIAC TROPONIN I PNL SERPL HS: 9 NG/L (ref 0–20)
CHLORIDE SERPL-SCNC: 104 MMOL/L (ref 98–107)
CO2 SERPL-SCNC: 25 MMOL/L (ref 21–32)
COLOR UR: YELLOW
CREAT SERPL-MCNC: 0.81 MG/DL (ref 0.5–1.3)
EGFRCR SERPLBLD CKD-EPI 2021: 88 ML/MIN/1.73M*2
ERYTHROCYTE [DISTWIDTH] IN BLOOD BY AUTOMATED COUNT: 12.7 % (ref 11.5–14.5)
ETHANOL SERPL-MCNC: <10 MG/DL
FOLATE SERPL-MCNC: 9.4 NG/ML
GLUCOSE SERPL-MCNC: 109 MG/DL (ref 74–99)
GLUCOSE UR STRIP.AUTO-MCNC: NEGATIVE MG/DL
HCT VFR BLD AUTO: 45.1 % (ref 41–52)
HGB BLD-MCNC: 15.7 G/DL (ref 13.5–17.5)
HOLD SPECIMEN: NORMAL
INR PPP: 1.2 (ref 0.9–1.1)
KETONES UR STRIP.AUTO-MCNC: NEGATIVE MG/DL
LEUKOCYTE ESTERASE UR QL STRIP.AUTO: ABNORMAL
MAGNESIUM SERPL-MCNC: 1.72 MG/DL (ref 1.6–2.4)
MCH RBC QN AUTO: 32.2 PG (ref 26–34)
MCHC RBC AUTO-ENTMCNC: 34.8 G/DL (ref 32–36)
MCV RBC AUTO: 92 FL (ref 80–100)
NITRITE UR QL STRIP.AUTO: NEGATIVE
NRBC BLD-RTO: 0 /100 WBCS (ref 0–0)
PH UR STRIP.AUTO: 7 [PH]
PLATELET # BLD AUTO: 114 X10*3/UL (ref 150–450)
POTASSIUM SERPL-SCNC: 4.2 MMOL/L (ref 3.5–5.3)
PROT UR STRIP.AUTO-MCNC: NEGATIVE MG/DL
PROTHROMBIN TIME: 14 SECONDS (ref 9.8–12.8)
RBC # BLD AUTO: 4.88 X10*6/UL (ref 4.5–5.9)
RBC # UR STRIP.AUTO: NEGATIVE /UL
RBC #/AREA URNS AUTO: NORMAL /HPF
RH FACTOR (ANTIGEN D): NORMAL
SODIUM SERPL-SCNC: 136 MMOL/L (ref 136–145)
SP GR UR STRIP.AUTO: 1.01
UROBILINOGEN UR STRIP.AUTO-MCNC: <2 MG/DL
VIT B12 SERPL-MCNC: 421 PG/ML (ref 211–911)
WBC # BLD AUTO: 10.7 X10*3/UL (ref 4.4–11.3)
WBC #/AREA URNS AUTO: NORMAL /HPF

## 2024-04-14 PROCEDURE — 2550000001 HC RX 255 CONTRASTS: Performed by: STUDENT IN AN ORGANIZED HEALTH CARE EDUCATION/TRAINING PROGRAM

## 2024-04-14 PROCEDURE — 36415 COLL VENOUS BLD VENIPUNCTURE: CPT | Performed by: NURSE PRACTITIONER

## 2024-04-14 PROCEDURE — G0378 HOSPITAL OBSERVATION PER HR: HCPCS

## 2024-04-14 PROCEDURE — C9113 INJ PANTOPRAZOLE SODIUM, VIA: HCPCS | Performed by: STUDENT IN AN ORGANIZED HEALTH CARE EDUCATION/TRAINING PROGRAM

## 2024-04-14 PROCEDURE — 96375 TX/PRO/DX INJ NEW DRUG ADDON: CPT | Mod: 59

## 2024-04-14 PROCEDURE — 85027 COMPLETE CBC AUTOMATED: CPT | Performed by: NURSE PRACTITIONER

## 2024-04-14 PROCEDURE — 99223 1ST HOSP IP/OBS HIGH 75: CPT | Performed by: SURGERY

## 2024-04-14 PROCEDURE — 87086 URINE CULTURE/COLONY COUNT: CPT | Mod: GEALAB | Performed by: NURSE PRACTITIONER

## 2024-04-14 PROCEDURE — 2500000004 HC RX 250 GENERAL PHARMACY W/ HCPCS (ALT 636 FOR OP/ED): Performed by: STUDENT IN AN ORGANIZED HEALTH CARE EDUCATION/TRAINING PROGRAM

## 2024-04-14 PROCEDURE — 2500000002 HC RX 250 W HCPCS SELF ADMINISTERED DRUGS (ALT 637 FOR MEDICARE OP, ALT 636 FOR OP/ED): Mod: MUE | Performed by: INTERNAL MEDICINE

## 2024-04-14 PROCEDURE — 74177 CT ABD & PELVIS W/CONTRAST: CPT | Performed by: RADIOLOGY

## 2024-04-14 PROCEDURE — 2500000004 HC RX 250 GENERAL PHARMACY W/ HCPCS (ALT 636 FOR OP/ED): Performed by: INTERNAL MEDICINE

## 2024-04-14 PROCEDURE — 36415 COLL VENOUS BLD VENIPUNCTURE: CPT | Performed by: STUDENT IN AN ORGANIZED HEALTH CARE EDUCATION/TRAINING PROGRAM

## 2024-04-14 PROCEDURE — 80048 BASIC METABOLIC PNL TOTAL CA: CPT | Performed by: NURSE PRACTITIONER

## 2024-04-14 PROCEDURE — 99223 1ST HOSP IP/OBS HIGH 75: CPT | Performed by: INTERNAL MEDICINE

## 2024-04-14 PROCEDURE — 82077 ASSAY SPEC XCP UR&BREATH IA: CPT | Performed by: INTERNAL MEDICINE

## 2024-04-14 PROCEDURE — 85610 PROTHROMBIN TIME: CPT | Performed by: NURSE PRACTITIONER

## 2024-04-14 PROCEDURE — 81001 URINALYSIS AUTO W/SCOPE: CPT | Performed by: NURSE PRACTITIONER

## 2024-04-14 PROCEDURE — 74177 CT ABD & PELVIS W/CONTRAST: CPT

## 2024-04-14 PROCEDURE — 82607 VITAMIN B-12: CPT | Mod: GEALAB | Performed by: INTERNAL MEDICINE

## 2024-04-14 PROCEDURE — 2500000001 HC RX 250 WO HCPCS SELF ADMINISTERED DRUGS (ALT 637 FOR MEDICARE OP): Performed by: INTERNAL MEDICINE

## 2024-04-14 PROCEDURE — 86901 BLOOD TYPING SEROLOGIC RH(D): CPT | Performed by: NURSE PRACTITIONER

## 2024-04-14 PROCEDURE — 83735 ASSAY OF MAGNESIUM: CPT | Performed by: INTERNAL MEDICINE

## 2024-04-14 PROCEDURE — 2500000002 HC RX 250 W HCPCS SELF ADMINISTERED DRUGS (ALT 637 FOR MEDICARE OP, ALT 636 FOR OP/ED): Mod: MUE | Performed by: STUDENT IN AN ORGANIZED HEALTH CARE EDUCATION/TRAINING PROGRAM

## 2024-04-14 PROCEDURE — 82746 ASSAY OF FOLIC ACID SERUM: CPT | Mod: GEALAB | Performed by: INTERNAL MEDICINE

## 2024-04-14 PROCEDURE — 2500000001 HC RX 250 WO HCPCS SELF ADMINISTERED DRUGS (ALT 637 FOR MEDICARE OP): Performed by: NURSE PRACTITIONER

## 2024-04-14 PROCEDURE — 84484 ASSAY OF TROPONIN QUANT: CPT | Performed by: STUDENT IN AN ORGANIZED HEALTH CARE EDUCATION/TRAINING PROGRAM

## 2024-04-14 RX ORDER — ALUMINUM HYDROXIDE, MAGNESIUM HYDROXIDE, AND SIMETHICONE 1200; 120; 1200 MG/30ML; MG/30ML; MG/30ML
30 SUSPENSION ORAL ONCE
Status: COMPLETED | OUTPATIENT
Start: 2024-04-14 | End: 2024-04-14

## 2024-04-14 RX ORDER — ONDANSETRON 4 MG/1
4 TABLET, FILM COATED ORAL EVERY 8 HOURS PRN
Status: DISCONTINUED | OUTPATIENT
Start: 2024-04-14 | End: 2024-04-14 | Stop reason: HOSPADM

## 2024-04-14 RX ORDER — ONDANSETRON HYDROCHLORIDE 2 MG/ML
4 INJECTION, SOLUTION INTRAVENOUS EVERY 8 HOURS PRN
Status: DISCONTINUED | OUTPATIENT
Start: 2024-04-14 | End: 2024-04-14 | Stop reason: HOSPADM

## 2024-04-14 RX ORDER — SUCRALFATE 1 G/1
1 TABLET ORAL
Qty: 56 TABLET | Refills: 0 | Status: SHIPPED | OUTPATIENT
Start: 2024-04-14 | End: 2024-04-28

## 2024-04-14 RX ORDER — METOPROLOL SUCCINATE 50 MG/1
100 TABLET, EXTENDED RELEASE ORAL DAILY
Status: DISCONTINUED | OUTPATIENT
Start: 2024-04-14 | End: 2024-04-14 | Stop reason: HOSPADM

## 2024-04-14 RX ORDER — DEXTROSE MONOHYDRATE, SODIUM CHLORIDE, AND POTASSIUM CHLORIDE 50; 1.49; 9 G/1000ML; G/1000ML; G/1000ML
100 INJECTION, SOLUTION INTRAVENOUS CONTINUOUS
Status: DISCONTINUED | OUTPATIENT
Start: 2024-04-14 | End: 2024-04-14 | Stop reason: HOSPADM

## 2024-04-14 RX ORDER — SUCRALFATE 1 G/10ML
1 SUSPENSION ORAL EVERY 6 HOURS SCHEDULED
Status: DISCONTINUED | OUTPATIENT
Start: 2024-04-14 | End: 2024-04-14 | Stop reason: HOSPADM

## 2024-04-14 RX ORDER — DEXTROSE MONOHYDRATE AND SODIUM CHLORIDE 5; .9 G/100ML; G/100ML
100 INJECTION, SOLUTION INTRAVENOUS CONTINUOUS
Status: DISCONTINUED | OUTPATIENT
Start: 2024-04-14 | End: 2024-04-14

## 2024-04-14 RX ORDER — ACETAMINOPHEN 325 MG/1
650 TABLET ORAL EVERY 6 HOURS PRN
Status: DISCONTINUED | OUTPATIENT
Start: 2024-04-14 | End: 2024-04-14 | Stop reason: HOSPADM

## 2024-04-14 RX ORDER — LORATADINE 10 MG/1
10 TABLET ORAL DAILY
Status: DISCONTINUED | OUTPATIENT
Start: 2024-04-14 | End: 2024-04-14 | Stop reason: HOSPADM

## 2024-04-14 RX ORDER — PANTOPRAZOLE SODIUM 40 MG/10ML
40 INJECTION, POWDER, LYOPHILIZED, FOR SOLUTION INTRAVENOUS ONCE
Status: COMPLETED | OUTPATIENT
Start: 2024-04-14 | End: 2024-04-14

## 2024-04-14 RX ORDER — THIAMINE HYDROCHLORIDE 100 MG/ML
100 INJECTION, SOLUTION INTRAMUSCULAR; INTRAVENOUS DAILY
Status: DISCONTINUED | OUTPATIENT
Start: 2024-04-14 | End: 2024-04-14 | Stop reason: HOSPADM

## 2024-04-14 RX ORDER — LIDOCAINE HYDROCHLORIDE 20 MG/ML
15 SOLUTION OROPHARYNGEAL ONCE
Status: COMPLETED | OUTPATIENT
Start: 2024-04-14 | End: 2024-04-14

## 2024-04-14 RX ORDER — PANTOPRAZOLE SODIUM 40 MG/10ML
40 INJECTION, POWDER, LYOPHILIZED, FOR SOLUTION INTRAVENOUS 2 TIMES DAILY
Status: DISCONTINUED | OUTPATIENT
Start: 2024-04-14 | End: 2024-04-14

## 2024-04-14 RX ORDER — PANTOPRAZOLE SODIUM 40 MG/1
40 TABLET, DELAYED RELEASE ORAL
Status: DISCONTINUED | OUTPATIENT
Start: 2024-04-14 | End: 2024-04-14 | Stop reason: HOSPADM

## 2024-04-14 RX ORDER — PANTOPRAZOLE SODIUM 40 MG/1
40 TABLET, DELAYED RELEASE ORAL
Qty: 28 TABLET | Refills: 0 | Status: SHIPPED | OUTPATIENT
Start: 2024-04-14 | End: 2024-04-28

## 2024-04-14 RX ADMIN — METOPROLOL SUCCINATE 100 MG: 50 TABLET, EXTENDED RELEASE ORAL at 08:52

## 2024-04-14 RX ADMIN — LORATADINE 10 MG: 10 TABLET ORAL at 08:52

## 2024-04-14 RX ADMIN — SUCRALFATE 1 G: 1 SUSPENSION ORAL at 08:56

## 2024-04-14 RX ADMIN — ALUMINUM HYDROXIDE, MAGNESIUM HYDROXIDE, AND DIMETHICONE 30 ML: 200; 20; 200 SUSPENSION ORAL at 02:38

## 2024-04-14 RX ADMIN — SUCRALFATE 1 G: 1 SUSPENSION ORAL at 02:38

## 2024-04-14 RX ADMIN — APIXABAN 5 MG: 5 TABLET, FILM COATED ORAL at 08:51

## 2024-04-14 RX ADMIN — POTASSIUM CHLORIDE, DEXTROSE MONOHYDRATE AND SODIUM CHLORIDE 100 ML/HR: 150; 5; 900 INJECTION, SOLUTION INTRAVENOUS at 09:54

## 2024-04-14 RX ADMIN — SUCRALFATE 1 G: 1 SUSPENSION ORAL at 12:47

## 2024-04-14 RX ADMIN — THIAMINE HYDROCHLORIDE 100 MG: 100 INJECTION, SOLUTION INTRAMUSCULAR; INTRAVENOUS at 10:46

## 2024-04-14 RX ADMIN — PANTOPRAZOLE SODIUM 40 MG: 40 INJECTION, POWDER, FOR SOLUTION INTRAVENOUS at 05:46

## 2024-04-14 RX ADMIN — DEXTROSE AND SODIUM CHLORIDE 100 ML/HR: 5; 900 INJECTION, SOLUTION INTRAVENOUS at 08:51

## 2024-04-14 RX ADMIN — LIDOCAINE HYDROCHLORIDE 15 ML: 20 SOLUTION ORAL at 02:38

## 2024-04-14 RX ADMIN — IOHEXOL 75 ML: 350 INJECTION, SOLUTION INTRAVENOUS at 00:34

## 2024-04-14 SDOH — SOCIAL STABILITY: SOCIAL INSECURITY: ARE THERE ANY APPARENT SIGNS OF INJURIES/BEHAVIORS THAT COULD BE RELATED TO ABUSE/NEGLECT?: NO

## 2024-04-14 SDOH — SOCIAL STABILITY: SOCIAL INSECURITY: ABUSE: ADULT

## 2024-04-14 SDOH — SOCIAL STABILITY: SOCIAL INSECURITY: DO YOU FEEL ANYONE HAS EXPLOITED OR TAKEN ADVANTAGE OF YOU FINANCIALLY OR OF YOUR PERSONAL PROPERTY?: NO

## 2024-04-14 SDOH — SOCIAL STABILITY: SOCIAL INSECURITY: DO YOU FEEL UNSAFE GOING BACK TO THE PLACE WHERE YOU ARE LIVING?: NO

## 2024-04-14 SDOH — SOCIAL STABILITY: SOCIAL INSECURITY: ARE YOU OR HAVE YOU BEEN THREATENED OR ABUSED PHYSICALLY, EMOTIONALLY, OR SEXUALLY BY ANYONE?: NO

## 2024-04-14 SDOH — SOCIAL STABILITY: SOCIAL INSECURITY: HAS ANYONE EVER THREATENED TO HURT YOUR FAMILY OR YOUR PETS?: NO

## 2024-04-14 SDOH — SOCIAL STABILITY: SOCIAL INSECURITY: DOES ANYONE TRY TO KEEP YOU FROM HAVING/CONTACTING OTHER FRIENDS OR DOING THINGS OUTSIDE YOUR HOME?: NO

## 2024-04-14 SDOH — SOCIAL STABILITY: SOCIAL INSECURITY: HAVE YOU HAD THOUGHTS OF HARMING ANYONE ELSE?: NO

## 2024-04-14 SDOH — SOCIAL STABILITY: SOCIAL INSECURITY: WERE YOU ABLE TO COMPLETE ALL THE BEHAVIORAL HEALTH SCREENINGS?: YES

## 2024-04-14 ASSESSMENT — ACTIVITIES OF DAILY LIVING (ADL)
ASSISTIVE_DEVICE: EYEGLASSES
LACK_OF_TRANSPORTATION: NO
TOILETING: INDEPENDENT
JUDGMENT_ADEQUATE_SAFELY_COMPLETE_DAILY_ACTIVITIES: YES
LACK_OF_TRANSPORTATION: NO
BATHING: INDEPENDENT
GROOMING: INDEPENDENT
LACK_OF_TRANSPORTATION: NO
WALKS IN HOME: INDEPENDENT
PATIENT'S MEMORY ADEQUATE TO SAFELY COMPLETE DAILY ACTIVITIES?: YES
HEARING - LEFT EAR: FUNCTIONAL
DRESSING YOURSELF: INDEPENDENT
FEEDING YOURSELF: INDEPENDENT
ADEQUATE_TO_COMPLETE_ADL: YES
HEARING - RIGHT EAR: FUNCTIONAL

## 2024-04-14 ASSESSMENT — LIFESTYLE VARIABLES
HOW MANY STANDARD DRINKS CONTAINING ALCOHOL DO YOU HAVE ON A TYPICAL DAY: 1 OR 2
AUDIT-C TOTAL SCORE: 2
HOW OFTEN DO YOU HAVE 6 OR MORE DRINKS ON ONE OCCASION: NEVER
HOW OFTEN DO YOU HAVE A DRINK CONTAINING ALCOHOL: 2-4 TIMES A MONTH
AUDIT-C TOTAL SCORE: 2
SKIP TO QUESTIONS 9-10: 1

## 2024-04-14 ASSESSMENT — PAIN SCALES - GENERAL
PAINLEVEL_OUTOF10: 0 - NO PAIN
PAINLEVEL_OUTOF10: 0 - NO PAIN

## 2024-04-14 ASSESSMENT — COGNITIVE AND FUNCTIONAL STATUS - GENERAL
PATIENT BASELINE BEDBOUND: NO
MOBILITY SCORE: 24
DAILY ACTIVITIY SCORE: 24

## 2024-04-14 ASSESSMENT — PATIENT HEALTH QUESTIONNAIRE - PHQ9
2. FEELING DOWN, DEPRESSED OR HOPELESS: NOT AT ALL
SUM OF ALL RESPONSES TO PHQ9 QUESTIONS 1 & 2: 0
1. LITTLE INTEREST OR PLEASURE IN DOING THINGS: NOT AT ALL

## 2024-04-14 ASSESSMENT — PAIN - FUNCTIONAL ASSESSMENT
PAIN_FUNCTIONAL_ASSESSMENT: 0-10
PAIN_FUNCTIONAL_ASSESSMENT: 0-10

## 2024-04-14 NOTE — H&P
History Of Present Illness  Lv Quinteros is a 83 y.o. male male with a past medical history of atrial fibrillation on Eliquis, CVA 3 years ago status post mechanical thrombectomy, cholelithiasis, and diabetes mellitus type 2 who was admitted to the hospital for abdominal pain.  Patient states that he started to have abdominal pain last night around 7 PM.  He states that the pain started on the right side of the back and then moved to the epigastric region.  He describes it as a constant burning sensation which was severe in intensity.  Patient was at the party and took 2 sips of Coca-Cola cocktail when the discomfort started.  Patient states that he usually drinks 1 martini every evening with dinner.  And also he drinks 1 glass of beer a lot of time with lunch but not all the time.  He denies any alcohol problem.  He denies any previous episodes of alcohol withdrawal.  His last drink was last night right before the abdominal pain started.  He denies melena, hematochezia, hematuria, leg pain, fever, chills, headache or dizziness.       Past Medical History  He has a past medical history of Actinic keratosis (05/09/2023), Arrhythmia, Arthritis, Cholelithiasis without obstruction (02/25/2020), Cough (10/04/2023), COVID-19 (10/04/2023), Diabetes mellitus (Multi), Erythema intertrigo (05/09/2023), High prostate specific antigen (PSA) (08/21/2019), Hypertension, Inflamed seborrheic keratosis (05/09/2023), Melanocytic nevi of unspecified upper limb, including shoulder (05/09/2023), Sore throat (10/04/2023), and Stroke (Multi).    Surgical History  He has no past surgical history on file.     Social History  He reports that he has quit smoking. His smoking use included cigarettes. He has never used smokeless tobacco. He reports current alcohol use of about 7.0 standard drinks of alcohol per week. He reports that he does not use drugs.    Family History  Family History   Problem Relation Name Age of Onset    Heart disease  Mother      Allergies Parent      Heart disease Parent          Allergies  Penicillins, Other, and Statins-hmg-coa reductase inhibitors    Medications  Scheduled medications  pantoprazole, 40 mg, intravenous, BID  sucralfate, 1 g, oral, q6h ALEJANDRA      Continuous medications  D5 % and 0.9 % sodium chloride, 100 mL/hr      PRN medications  PRN medications: ondansetron **OR** ondansetron    Review of systems: 10-point review of systems is negative.     Physical Exam  Constitutional: alert and oriented x 3, awake, cooperative, no acute distress  Skin: warm and dry  Head/Neck: Normocephalic, atraumatic  Eyes: clear sclera  ENMT: mucous membranes moist  Cardio: Regular rate and rhythm  Resp: CTA bilaterally, good respiratory effort  Gastrointestinal: Soft, no tenderness on palpation of the entire abdomen.  No guarding.  Bowel sounds present.  Musculoskeletal: ROM intact, no joint swelling  Extremities: No edema, cyanosis, or clubbing  Neuro: lert and oriented x 3, sensation is intact.  Patient moves all limbs against resistance.  Psychological: Appropriate mood and behavior     Last Recorded Vitals  /65 (BP Location: Right arm, Patient Position: Lying)   Pulse 56   Temp 36.5 °C (97.7 °F) (Temporal)   Resp 18   Wt 71.6 kg (157 lb 13.6 oz)   SpO2 95%     Relevant Results  Results for orders placed or performed during the hospital encounter of 04/13/24 (from the past 24 hour(s))   CBC and Auto Differential   Result Value Ref Range    WBC 10.5 4.4 - 11.3 x10*3/uL    nRBC 0.0 0.0 - 0.0 /100 WBCs    RBC 5.18 4.50 - 5.90 x10*6/uL    Hemoglobin 16.5 13.5 - 17.5 g/dL    Hematocrit 47.9 41.0 - 52.0 %    MCV 93 80 - 100 fL    MCH 31.9 26.0 - 34.0 pg    MCHC 34.4 32.0 - 36.0 g/dL    RDW 12.8 11.5 - 14.5 %    Platelets 162 150 - 450 x10*3/uL    Neutrophils % 71.5 40.0 - 80.0 %    Immature Granulocytes %, Automated 0.3 0.0 - 0.9 %    Lymphocytes % 17.4 13.0 - 44.0 %    Monocytes % 7.2 2.0 - 10.0 %    Eosinophils % 3.0 0.0 -  6.0 %    Basophils % 0.6 0.0 - 2.0 %    Neutrophils Absolute 7.49 (H) 1.60 - 5.50 x10*3/uL    Immature Granulocytes Absolute, Automated 0.03 0.00 - 0.50 x10*3/uL    Lymphocytes Absolute 1.82 0.80 - 3.00 x10*3/uL    Monocytes Absolute 0.75 0.05 - 0.80 x10*3/uL    Eosinophils Absolute 0.31 0.00 - 0.40 x10*3/uL    Basophils Absolute 0.06 0.00 - 0.10 x10*3/uL   Comprehensive metabolic panel   Result Value Ref Range    Glucose 130 (H) 74 - 99 mg/dL    Sodium 137 136 - 145 mmol/L    Potassium 3.5 3.5 - 5.3 mmol/L    Chloride 103 98 - 107 mmol/L    Bicarbonate 24 21 - 32 mmol/L    Anion Gap 14 10 - 20 mmol/L    Urea Nitrogen 17 6 - 23 mg/dL    Creatinine 1.05 0.50 - 1.30 mg/dL    eGFR 71 >60 mL/min/1.73m*2    Calcium 8.7 8.6 - 10.3 mg/dL    Albumin 4.0 3.4 - 5.0 g/dL    Alkaline Phosphatase 38 33 - 136 U/L    Total Protein 6.4 6.4 - 8.2 g/dL    AST 19 9 - 39 U/L    Bilirubin, Total 0.7 0.0 - 1.2 mg/dL    ALT 18 10 - 52 U/L   Lipase   Result Value Ref Range    Lipase 46 9 - 82 U/L   Troponin I, High Sensitivity   Result Value Ref Range    Troponin I, High Sensitivity 8 0 - 20 ng/L   Troponin I, High Sensitivity   Result Value Ref Range    Troponin I, High Sensitivity 9 0 - 20 ng/L   Protime-INR   Result Value Ref Range    Protime 14.0 (H) 9.8 - 12.8 seconds    INR 1.2 (H) 0.9 - 1.1   Type and Screen   Result Value Ref Range    ABO TYPE A     Rh TYPE POS     ANTIBODY SCREEN NEG    CBC   Result Value Ref Range    WBC 10.7 4.4 - 11.3 x10*3/uL    nRBC 0.0 0.0 - 0.0 /100 WBCs    RBC 4.88 4.50 - 5.90 x10*6/uL    Hemoglobin 15.7 13.5 - 17.5 g/dL    Hematocrit 45.1 41.0 - 52.0 %    MCV 92 80 - 100 fL    MCH 32.2 26.0 - 34.0 pg    MCHC 34.8 32.0 - 36.0 g/dL    RDW 12.7 11.5 - 14.5 %    Platelets 114 (L) 150 - 450 x10*3/uL   Basic Metabolic Panel   Result Value Ref Range    Glucose 109 (H) 74 - 99 mg/dL    Sodium 136 136 - 145 mmol/L    Potassium 4.2 3.5 - 5.3 mmol/L    Chloride 104 98 - 107 mmol/L    Bicarbonate 25 21 - 32 mmol/L     Anion Gap 11 10 - 20 mmol/L    Urea Nitrogen 14 6 - 23 mg/dL    Creatinine 0.81 0.50 - 1.30 mg/dL    eGFR 88 >60 mL/min/1.73m*2    Calcium 8.2 (L) 8.6 - 10.3 mg/dL     CT abdomen pelvis w IV contrast    Result Date: 4/14/2024  Interpreted By:  Nicole Bowen, STUDY: CT ABDOMEN PELVIS W IV CONTRAST;  4/14/2024 12:33 am   INDICATION: Signs/Symptoms:epigastric discomfort.   COMPARISON: None.   ACCESSION NUMBER(S): [id number]   ORDERING CLINICIAN: AMAN SIERRA   TECHNIQUE: Axial CT images of the abdomen and pelvis with coronal and sagittal reconstructed images obtained after intravenous administration of 75 mL of Omnipaque 350.   FINDINGS: LOWER CHEST: Bibasilar atelectasis. Aortic root and moderate to severe coronary artery calcifications noted. Small hiatal hernia.   ABDOMEN:   LIVER: Normal morphology. Subcentimeter hypodense focus in the right hepatic lobe is too small to characterize. BILE DUCTS: Normal caliber. GALLBLADDER: Cholelithiasis. No wall thickening. PANCREAS: Within normal limits. SPLEEN: Within normal limits.  Accessory splenules noted. A 1.4 cm hypodense lesion along the inferior margin of the spleen measures fluid attenuation suggestive of a pseudocyst. ADRENALS: Within normal limits. KIDNEYS and URETERS: Symmetric renal enhancement. No hydronephrosis or perinephric fluid collection. 2 nonobstructing calculi noted in the right kidney, largest measuring up to 8 mm. No evidence for left renal or bilateral ureteral calculi.   VESSELS:  Calcific atherosclerosis of the aortoiliac and mesenteric vessels. No aortic aneurysm. RETROPERITONEUM: No pathologically enlarged retroperitoneal lymph nodes.   PELVIS:   REPRODUCTIVE ORGANS: Prostatomegaly measuring 5.9 cm in transverse dimension. Central prostatic calcifications noted. BLADDER: Bladder is partially distended with apparent wall thickening.   BOWEL: Small hiatal hernia. Stomach is under distended. Visualized loops of bowel are without evidence for  obstruction. Scattered colonic diverticula without evidence for acute diverticulitis. No pneumatosis or portal venous gas. Normal appendix. PERITONEUM: No ascites or free air, no fluid collection.   ABDOMINAL WALL: Bilateral inguinal hernias containing fat. BONES: Multilevel degenerative changes of the spine.       No acute abdominal or pelvic process.   Diverticulosis without evidence for acute diverticulitis.   Nonobstructed right renal calculi noted.   Prostatomegaly. Mild bladder wall thickening which may relate to under distention or sequela of chronic outlet obstruction. Correlate with symptomatology and urinalysis if there is concern for superimposed cystitis.   Cholelithiasis.   Additional findings as noted above.   MACRO: None   Signed by: Nicole Bowen 4/14/2024 1:12 AM Dictation workstation:   [id number]    XR chest 1 view    Result Date: 4/14/2024  Interpreted By:  Reuben Rodriguez, STUDY: XR CHEST 1 VIEW;  4/13/2024 11:15 pm   INDICATION: Signs/Symptoms:epigastric discomfort.   COMPARISON: Chest radiograph 03/15/2021   ACCESSION NUMBER(S): [id number]   ORDERING CLINICIAN: AMAN SIERRA   FINDINGS: SUPPORT DEVICES: None.   CARDIOMEDIASTINAL SILHOUETTE: Borderline cardiomegaly. Mild central vascular congestion.   LUNGS: Streaky perihilar opacities and mild interstitial prominence. No large pleural effusion or discernible pneumothorax.   ABDOMEN: No remarkable upper abdominal findings.   BONES: No acute osseous abnormality.       1. Mild central vascular congestion streaky perihilar opacities which may represent mild edema.     Signed by: Reuben Rodriguez 4/14/2024 12:08 AM Dictation workstation:   [id number]    ECG 12 lead (Clinic Performed)    Result Date: 4/13/2024  Normal sinus rhythm with sinus arrhythmia Right bundle branch block Left anterior fascicular block. Bifascicular block. Left ventricular hypertrophy with QRS widening Abnormal ECG When compared with ECG of 11-MAR-2021 17:25, Significant  changes have occurred Confirmed by Kiel Devi ([id number]) on 4/13/2024 10:24:16 PM    Vascular US carotid artery duplex bilateral    Result Date: 4/3/2024          [place] [address]  Tel [phone] and Fax [phone]  Vascular Lab Report VASC US CAROTID ARTERY DUPLEX BILATERAL  Patient Name:      MARY ESPOSITO      Reading Physician:  [id number] Yareli Sapp MD Study Date:        4/1/2024             Ordering Physician: [id number] OLIVIA DILLON MRN/PID:           [id number]             Technologist:       Inessa Rizzo RVT Accession#:        [id number]         Technologist 2: Date of Birth/Age: 1940 / 83 years Encounter#:         [id number] Gender:            M Admission Status:  Outpatient           Location Performed: [facility]  Diagnosis/ICD: Other specified symptoms and signs involving the circulatory and                respiratory systems-R09.89 CPT Codes:     59961 Cerebrovascular Carotid Duplex scan complete  CONCLUSIONS: Right Carotid: Findings are consistent with less than 50% stenosis of the right proximal internal carotid artery. Laminar flow seen by color Doppler. Right external carotid artery appears patent with no evidence of stenosis. The right vertebral artery is patent with antegrade flow. No evidence of hemodynamically significant stenosis in the right subclavian artery. Non-vascular structure on right thyroid with the largest measuring 0.4 cm x 0.4 cm. Left Carotid: Findings are consistent with less than 50% stenosis of the left proximal internal carotid artery. Laminar flow seen by color Doppler. Left external carotid artery appears patent with no evidence of stenosis. The left vertebral artery is patent with antegrade flow. No evidence of hemodynamically significant stenosis in the left subclavian  artery.  Additional Findings: Irregular heart rhtyhm noted.  Imaging & Doppler Findings: Right Plaque Morph: The proximal right internal carotid artery demonstrates heterogenous plaque. The proximal right external carotid artery demonstrates heterogenous plaque. The distal right common carotid artery demonstrates heterogenous plaque. Left Plaque Morph: The proximal left internal carotid artery demonstrates heterogenous plaque. The proximal left external carotid artery demonstrates heterogenous plaque. The distal left common carotid artery demonstrates heterogenous plaque.   Right                        Left   PSV      EDV                PSV     EDV 81 cm/s            CCA P    85 cm/s 47 cm/s            CCA D    59 cm/s 57 cm/s  11 cm/s   ICA P    44 cm/s  7 cm/s 58 cm/s            ICA M    51 cm/s 35 cm/s            ICA D    74 cm/s 99 cm/s             ECA     53 cm/s 34 cm/s          Vertebral  29 cm/s 202 cm/s         Subclavian 191 cm/s               Right Left ICA/CCA Ratio  1.2  0.7   [id number] Yareli Sapp MD Electronically signed by [id number] Yareli Sapp MD on 4/3/2024 at 12:33:51 PM  ** Final **        Assessment/Plan   Principal Problem:    Epigastric pain    Lv Quinteros is a 83 y.o. male male with a past medical history of atrial fibrillation on Eliquis, CVA 3 years ago status post mechanical thrombectomy, cholelithiasis, and diabetes mellitus type 2 who was admitted to the hospital for abdominal pain.      Abdominal pain  -Concerning for gastritis versus stomach or duodenal ulcer.  Patient states that the pain initially started on the right side and moved to the epigastric region.  He is known to have cholelithiasis.  The gallstones may be a contributing factor.  -ER attending reported that the patient's girlfriend and ex-wife were concerned about his drinking  -He currently has no stigmata of alcoholic liver disease.  LFTs are within normal limit.  He also states that withdrawal.  He  denies any alcohol problem.  -Admit to general medicine  -Continue pain management  -Give IV fluids  -He received pantoprazole and sucralfate  -Follow-up with general surgery  -Will monitor    Atrial fibrillation  -Will resume Eliquis  -Will hold aspirin  -Restart metoprolol    DVT prophylaxis  -Already on Eliquis    Will resume home meds for comorbidities.         Glenn Mason MD

## 2024-04-14 NOTE — DISCHARGE INSTRUCTIONS
Follow up with general surgery is recommended as an outpatient. Please call to schedule an appointment to discuss further testing for stomach inflammation or ulcers.

## 2024-04-14 NOTE — CONSULTS
General Surgery History and Physical    Referring Provider:  DO Dr. Marlon Mckeon    Chief Complaint:  Epigastric pain    History of Present Illness:  This is a 83 y.o. male who presents with epigastric pain.  He reports that after drinking an alcoholic beverage yesterday at a social engagement he began having epigastric pain.  He reports that it might of originated in his right upper quadrant, but certainly settled in his epigastrium.  He denies any radiation to his back.  He denies ever having had pain like this before.  He reports that the pain was a burning sensation.  He reports that it is completely resolved with antacids in the hospital.  He denies any fevers, chills, nausea, vomiting, jaundice, and icterus.  He also reports that he has known about gallstones for 20 to 30 years, and that these have been completely asymptomatic.    Past Medical History:  Stroke  Arrhythmia  Arthritis  Asymptomatic cholelithiasis  Diabetes  Hypertension    Past Surgical History:  Patient denies any    Medications:  Eliquis  Aspirin  Claritin  Metoprolol    Allergies:  Penicillin  Statins    Family History:  Heart disease  Allergies    Social History:  .  Retired.  Former smoker.  Denies illicits.  Drinks about 2 alcoholic beverages a day.       Review of Systems:  A complete 12 point review of systems was performed and is negative except as noted in the history of present illness.      Vital Signs:  Vitals:    04/14/24 0644   BP: 156/65   Pulse: 56   Resp: 18   Temp: 36.5 °C (97.7 °F)   SpO2: 95%          Physical Exam:  General: No acute distress. Sitting up in bed.   Neuro: Alert and oriented ×3. Follows commands.  Head: Atraumatic  Eyes: Pupils equal reactive to light. Extraocular motions intact.  Ears: Hears normal speaking voice.  Mouth, Nose, Throat: Mucous membranes moist.  Normal dentition.  Neck: Supple. No appreciable masses.  Chest: No crepitus.  No appreciable scars.  Heart: Regular rate and  rhythm.  Lung: Clear to auscultation bilaterally.  Vascular: No carotid bruits.  Palpable radial pulses bilaterally.  Abdomen: Soft. Nondistended. Nontender.  No appreciable hernias or scars.  Musculoskeletal: Moves all extremities.  Normal range of motion.  Lymphatic: No palpable lymph nodes.  Skin: No rashes or lesions.  Psychological: Normal affect      Laboratory Values:  CBC:   Lab Results   Component Value Date    WBC 10.7 04/14/2024    RBC 4.88 04/14/2024    HGB 15.7 04/14/2024    HCT 45.1 04/14/2024     (L) 04/14/2024       RFP:   Lab Results   Component Value Date     04/14/2024    K 4.2 04/14/2024     04/14/2024    CO2 25 04/14/2024    BUN 14 04/14/2024    CREATININE 0.81 04/14/2024    CALCIUM 8.2 (L) 04/14/2024    MG 1.72 04/14/2024        LFTs:   Lab Results   Component Value Date    PROT 6.4 04/13/2024    ALBUMIN 4.0 04/13/2024    BILITOT 0.7 04/13/2024    ALKPHOS 38 04/13/2024    AST 19 04/13/2024    ALT 18 04/13/2024            Imaging:  I have personally reviewed the images and the radiologist's report.  CT abdomen pelvis: Gallstones.      Assessment:  This is a 83 y.o. male who presents with epigastric pain.  We discussed that most likely this pain is due to gastritis, and I recommend empiric treatment with antacids.  We discussed that he should have outpatient follow-up to discuss an esophagogastroduodenoscopy.  We discussed that it is possible his symptoms are from his gallstones, even though they have been asymptomatic in the past.  We discussed that this should also be followed up as an outpatient.  We discussed if it is determined that his symptoms were from the gallstones, we would likely recommend a laparoscopic cholecystectomy.  However, it was determined that it is not due to the gallstones and is in fact due to gastritis, then we would recommend watchful waiting of the gallstones.      Plan:  -- Continue proton pump inhibitor twice daily for 2 weeks followed by once  daily indefinitely  -- Continue Carafate 4 times a day  -- Recommend outpatient follow-up with general surgery  -- Surgery will sign off for now.  Please call with questions  -- Advance diet as tolerated      Salinas Baltazar MD  General Surgery  Office: [phone]  Fax:     [phone]  11:46 AM   04/14/24

## 2024-04-14 NOTE — DISCHARGE SUMMARY
Discharge Diagnosis  Epigastric Pain likely due to gastritis  Atrial Fibrillation    Issues Requiring Follow-Up  Follow up with general surgery is recommended as an outpatient. Please call to schedule an appointment to discuss further testing for stomach inflammation or ulcers.    Discharge Meds     Your medication list        START taking these medications        Instructions Last Dose Given Next Dose Due   pantoprazole 40 mg EC tablet  Commonly known as: ProtoNix      Take 1 tablet (40 mg) by mouth 2 times a day before meals for 28 doses. Do not crush, chew, or split.       sucralfate 1 gram tablet  Commonly known as: Carafate      Take 1 tablet (1 g) by mouth 4 times a day before meals for 14 days.              CONTINUE taking these medications        Instructions Last Dose Given Next Dose Due   apixaban 5 mg tablet  Commonly known as: Eliquis      Take 1 tablet (5 mg) by mouth 2 times a day.       aspirin 81 mg EC tablet           loratadine 10 mg tablet  Commonly known as: Claritin           metoprolol succinate  mg 24 hr tablet  Commonly known as: Toprol-XL           sulfacetamide sodium-sulfur 9-4 % cleanser      Apply 1 Application topically once daily.                 Where to Get Your Medications        These medications were sent to [id number] [place] [id number]      Phone: [phone]   pantoprazole 40 mg EC tablet  sucralfate 1 gram tablet         Test Results Pending At Discharge  Pending Labs       Order Current Status    Extra Urine Gray Tube In process    Folate In process    Urinalysis with Reflex Culture and Microscopic In process    Urine Culture In process    Vitamin B12 In process            Hospital Course   Lv Quinteros is an 83 year old male presented with epigastric pain.  He reported that after drinking an alcoholic beverage on the day prior to presentation at a social engagement, he began having epigastric  pain.  He reported that it might have originated in his right upper quadrant, but certainly settled in his epigastrium.  He denied any radiation to his back.  He denied ever having had pain like this before.  He reported that the pain was a burning sensation.  He reported that it was completely resolved with antacids in the hospital.  He denied any fevers, chills, nausea, vomiting, jaundice, and icterus.  He also reported that he had known about gallstones for 20 to 30 years, and that these had been completely asymptomatic.     After admission, he was seen in consultation by general surgery, Dr. Baltazar. His symptoms were felt most likely due to gastritis. Empiric antacid treatment with PPI and sucralfate was recommended. Outpatient follow up was recommended for potential EGD and other testing as deemed necessary. He was advanced to a low residue diet, which he tolerated without issue. He noted no recurrence of pain and motivation for homegoing. No significant laboratory abnormalities were seen. He was discharged home with follow up as noted.     Pertinent Physical Exam At Time of Discharge  Constitutional: alert and oriented x 3, awake, cooperative, no acute distress  Skin: warm and dry  Head/Neck: Normocephalic, atraumatic  Eyes: clear sclera  ENMT: mucous membranes moist  Cardio: Regular rate and rhythm  Resp: CTA bilaterally, good respiratory effort  Gastrointestinal: Soft, no tenderness on palpation of the entire abdomen.  No guarding.  Bowel sounds present.  Musculoskeletal: ROM intact, no joint swelling  Extremities: No edema, cyanosis, or clubbing  Neuro: lert and oriented x 3, sensation is intact.  Patient moves all limbs against resistance.  Psychological: Appropriate mood and behavior    Outpatient Follow-Up  Future Appointments   Date Time Provider Department Center   7/8/2024 11:00 AM Allan Wallis, APRN-CNP [id number] [facility]         Cory Colon MD

## 2024-04-14 NOTE — ED PROVIDER NOTES
Emergency Department Encounter  [facility] EMERGENCY MEDICINE    Patient: Lv Quinteros  MRN: [id number]  : 1940  Date of Evaluation: 2024  ED Provider: GRETCHEN Shaffer    ED care was supervised by Dr. Patterson who independently examined and evaluated the patient. Please see their attestation note for further details.    Limitations to history: none  Independent Historian: self  Records reviewed: Care everywhere, paper chart, Inpatient and outpatient notes    Chief Complaint       Chief Complaint   Patient presents with   • Abdominal Pain     Right flank pain that moved to epigastric pain for 2 days     Samish    (Location/Symptom, Timing/Onset, Context/Setting, Quality, Duration, Modifying Factors, Severity) Note limiting factors.   Lv Quinteros is a 83 y.o. male who presents to the emergency department complaining of epigastric discomfort that started around 7 PM tonight, states he took 2 sips of a Coca-Cola cocktail while at a birthday party and started having epigastric discomfort, burning, states that he feels like if he belch he would feel better, denies any nausea, vomiting, fever, chills.  Denies any abdominal tenderness, has a known hernia to the right lower quadrant.  Denies any history of abdominal surgeries.  Does have history of atrial fibrillation and cardiac surgery with bypass.  Denies any chest pain, does report shortness of breath but states that this is chronic.  Denies any cough, headaches, visual changes, syncope, palpitations.  Denies any flank pain, urinary symptoms.  States that he had a normal bowel movement today.      ROS:     Review of Systems  14 systems reviewed and otherwise acutely negative except as in the Samish.          Past History     Past Medical History:   Diagnosis Date   • Actinic keratosis 2023   • Arrhythmia     PAF   • Arthritis    • Cholelithiasis without obstruction 2020   • Cough 10/04/2023   • COVID-19 10/04/2023   •  Diabetes mellitus (Multi)    • Erythema intertrigo 05/09/2023   • High prostate specific antigen (PSA) 08/21/2019   • Hypertension    • Inflamed seborrheic keratosis 05/09/2023   • Melanocytic nevi of unspecified upper limb, including shoulder 05/09/2023   • Sore throat 10/04/2023   • Stroke (Multi)     approx 3 yrs ago     No past surgical history on file.  Social History     Socioeconomic History   • Marital status:      Spouse name: Not on file   • Number of children: Not on file   • Years of education: Not on file   • Highest education level: Not on file   Occupational History   • Not on file   Tobacco Use   • Smoking status: Former     Types: Cigarettes   • Smokeless tobacco: Never   Vaping Use   • Vaping status: Never Used   Substance and Sexual Activity   • Alcohol use: Yes     Alcohol/week: 7.0 standard drinks of alcohol     Types: 7 Cans of beer per week   • Drug use: Never   • Sexual activity: Not on file   Other Topics Concern   • Not on file   Social History Narrative   • Not on file     Social Determinants of Health     Financial Resource Strain: Not on file   Food Insecurity: Not on file   Transportation Needs: Not on file   Physical Activity: Not on file   Stress: Not on file   Social Connections: Not on file   Intimate Partner Violence: Not on file   Housing Stability: Not on file       Medications/Allergies     Previous Medications    APIXABAN (ELIQUIS) 5 MG TABLET    Take 1 tablet (5 mg) by mouth 2 times a day.    ASPIRIN 81 MG EC TABLET    Take 1 tablet (81 mg) by mouth 2 times a day.    LORATADINE (CLARITIN) 10 MG TABLET    Take 1 tablet (10 mg) by mouth once daily.    METOPROLOL SUCCINATE XL (TOPROL-XL) 100 MG 24 HR TABLET    Take 1 tablet (100 mg) by mouth. Take 1 tab in the morning, half tablet in the evening.    SULFACETAMIDE SODIUM-SULFUR 9-4 % CLEANSER    Apply 1 Application topically once daily.     Allergies   Allergen Reactions   • Penicillins Hives, Rash, Shortness of breath and  Unknown   • Other Other     flushing, near-syncope   • Statins-Hmg-Coa Reductase Inhibitors Unknown     muscle weakness on Lipitor; crestor ok        Physical Exam       ED Triage Vitals   Temp Pulse Resp BP   -- -- -- --      SpO2 Temp src Heart Rate Source Patient Position   -- -- -- --      BP Location FiO2 (%)     -- --         Physical Exam    GENERAL:  The patient appears nourished and normally developed. Vital signs as documented.     HEENT:  Head normocephalic, atraumatic, EOMs intact, PERRLA, Mucous membranes moist. Nares patent without copious rhinorrhea.  No lymphadenopathy.    PULMONARY:  Lungs are clear to auscultation, without any respiratory distress. Able to speak full sentences, no accessory muscle use    CARDIAC:   Normal rate. No murmurs, rubs or gallops    ABDOMEN:  Soft, non-distended, mild epigastric tenderness, BS positive x 4 quadrants, No rebound or guarding, no peritoneal signs, no CVA tenderness, no masses or organomegaly    MUSCULOSKELETAL:   Able to ambulate, Non edematous, with no obvious deformities. Pulses intact distal    SKIN:   Good color, with no significant rashes.  No pallor.    NEURO:  No obvious neurological deficits, normal sensation and strength bilaterally.  Able to follow commands, NIH 0, CN 2-12 intact.        Diagnostics   Labs:  Results for orders placed or performed during the hospital encounter of 04/13/24   CBC and Auto Differential   Result Value Ref Range    WBC 10.5 4.4 - 11.3 x10*3/uL    nRBC 0.0 0.0 - 0.0 /100 WBCs    RBC 5.18 4.50 - 5.90 x10*6/uL    Hemoglobin 16.5 13.5 - 17.5 g/dL    Hematocrit 47.9 41.0 - 52.0 %    MCV 93 80 - 100 fL    MCH 31.9 26.0 - 34.0 pg    MCHC 34.4 32.0 - 36.0 g/dL    RDW 12.8 11.5 - 14.5 %    Platelets 162 150 - 450 x10*3/uL    Neutrophils % 71.5 40.0 - 80.0 %    Immature Granulocytes %, Automated 0.3 0.0 - 0.9 %    Lymphocytes % 17.4 13.0 - 44.0 %    Monocytes % 7.2 2.0 - 10.0 %    Eosinophils % 3.0 0.0 - 6.0 %    Basophils % 0.6  0.0 - 2.0 %    Neutrophils Absolute 7.49 (H) 1.60 - 5.50 x10*3/uL    Immature Granulocytes Absolute, Automated 0.03 0.00 - 0.50 x10*3/uL    Lymphocytes Absolute 1.82 0.80 - 3.00 x10*3/uL    Monocytes Absolute 0.75 0.05 - 0.80 x10*3/uL    Eosinophils Absolute 0.31 0.00 - 0.40 x10*3/uL    Basophils Absolute 0.06 0.00 - 0.10 x10*3/uL   Comprehensive metabolic panel   Result Value Ref Range    Glucose 130 (H) 74 - 99 mg/dL    Sodium 137 136 - 145 mmol/L    Potassium 3.5 3.5 - 5.3 mmol/L    Chloride 103 98 - 107 mmol/L    Bicarbonate 24 21 - 32 mmol/L    Anion Gap 14 10 - 20 mmol/L    Urea Nitrogen 17 6 - 23 mg/dL    Creatinine 1.05 0.50 - 1.30 mg/dL    eGFR 71 >60 mL/min/1.73m*2    Calcium 8.7 8.6 - 10.3 mg/dL    Albumin 4.0 3.4 - 5.0 g/dL    Alkaline Phosphatase 38 33 - 136 U/L    Total Protein 6.4 6.4 - 8.2 g/dL    AST 19 9 - 39 U/L    Bilirubin, Total 0.7 0.0 - 1.2 mg/dL    ALT 18 10 - 52 U/L   Lipase   Result Value Ref Range    Lipase 46 9 - 82 U/L   Troponin I, High Sensitivity   Result Value Ref Range    Troponin I, High Sensitivity 8 0 - 20 ng/L     Radiographs:  CT abdomen pelvis w IV contrast   Final Result   No acute abdominal or pelvic process.        Diverticulosis without evidence for acute diverticulitis.        Nonobstructed right renal calculi noted.        Prostatomegaly. Mild bladder wall thickening which may relate to   under distention or sequela of chronic outlet obstruction. Correlate   with symptomatology and urinalysis if there is concern for   superimposed cystitis.        Cholelithiasis.        Additional findings as noted above.        MACRO:   None        Signed by: Nicole Bowen 4/14/2024 1:12 AM   Dictation workstation:   [id number]      XR chest 1 view   Final Result   1. Mild central vascular congestion streaky perihilar opacities which   may represent mild edema.             Signed by: Reuben Rodriguez 4/14/2024 12:08 AM   Dictation workstation:   [id number]          Procedures:  "  Procedures           Assessment   In brief, Lv Quinteros is a 83 y.o. male who presented to the emergency department with midepigastric burning while at a birthday party after drinking a Coca-Cola beverage    Plan   IV, lab work, EKG, imaging    Differentials   Atypical ACS  GERD  PUD  Cholecystitis  Pancreatitis  Cholangitis   viral illness      ED Course     ED Course as of 04/20/24 2041   Sun Apr 14, 2024   0682 Patient is an 83-year-old male presents emergency department with epigastric abdominal discomfort.  He states he drank a couple sips of his Coca-Cola cocktail while at a birthday party and he started having right upper quadrant abdominal pain that radiated to his epigastric region.  Ex-wife and girlfriend at bedside states he drinks on a routine basis and usually has martinis.  Patient denies any hematochezia or melena.  Patient states it feels like a dull ache in his abdomen.  Lipase unremarkable.  Labs reassuring.  Cardiac enzymes also reassuring as is likely not an atypical presentation of ACS.  EKG read by me reviewed by me is sinus bradycardia 50 bpm.  Left axis deviation.  Right bundle branch block appreciated.  No significant ST segment elevation or depression.  Given the concern for peptic ulcer disease or biliary colic treated with a GI cocktail with no improvement of symptoms.  Given that he is still having significant abdominal pain patient will be admitted with surgery on consult for potential further workup and evaluation including EGD or HIDA scan or right upper quadrant ultrasound.  Consult placed for general surgery and patient admitted to medicine. [HD]      ED Course User Index  [HD] Elvi Patterson DO         Diagnoses as of 04/20/24 2041   Abdominal pain, epigastric       Visit Vitals  /70   Pulse (!) 49   Temp 36.2 °C (97.2 °F) (Skin)   Resp 16   Ht 1.778 m (5' 10\")   Wt 74.8 kg (165 lb)   SpO2 (!) 93%   BMI 23.68 kg/m²   Smoking Status Former   BSA 1.92 m² "       Medications   alum-mag hydroxide-simeth (Mylanta) 200-200-20 mg/5 mL oral suspension 30 mL (has no administration in time range)   lidocaine (Xylocaine) 2 % mouth solution 15 mL (has no administration in time range)   famotidine PF (Pepcid) injection 20 mg (20 mg intravenous Given 4/13/24 1504)   iohexol (OMNIPaque) 350 mg iodine/mL solution 75 mL (75 mL intravenous Given 4/14/24 0034)       Plan of care discussed, patient is stable appearing, in no distress, EKG obtained at 11:15 PM showing sinus bradycardia with first-degree AV block, has a history of atrial fibrillation, rate of 50, indication of epigastric discomfort, no ST elevation, normal axis.  Lab work with no leukocytosis, given Pepcid.  Sent for CT scan which shows known cholelithiasis without cholecystitis, troponin was negative, chest x-ray without any infiltrates, possibly pulmonary edema however patient denies any cough, new dyspnea.  Case discussed with and seen by ED attending      Final Impression      1. Abdominal pain, epigastric          DISPOSITION  Disposition: Discharge to home  Patient condition is stable    Comment: Please note this report has been produced using speech recognition software and may contain errors related to that system including errors in grammar, punctuation, and spelling, as well as words and phrases that may be inappropriate.  If there are any questions or concerns please feel free to contact the dictating provider for clarification.    GRETCHEN Shaffer APRN-CNP  04/20/24 2041

## 2024-04-14 NOTE — PROGRESS NOTES
04/14/24 1317   Discharge Planning   Living Arrangements Alone   Support Systems Friends/neighbors   Assistance Needed A&OX3; independent with ADLs with no DME; drives; room air baseline and currently room air   Type of Residence Private residence   Number of Stairs to Enter Residence 0   Number of Stairs Within Residence 2   Who is requesting discharge planning? Provider   Patient expects to be discharged to: home no needs   Does the patient need discharge transport arranged? No   Financial Resource Strain   How hard is it for you to pay for the very basics like food, housing, medical care, and heating? Not hard   Housing Stability   In the last 12 months, was there a time when you were not able to pay the mortgage or rent on time? N   In the last 12 months, how many places have you lived? 1   In the last 12 months, was there a time when you did not have a steady place to sleep or slept in a shelter (including now)? N   Transportation Needs   In the past 12 months, has lack of transportation kept you from medical appointments or from getting medications? no   In the past 12 months, has lack of transportation kept you from meetings, work, or from getting things needed for daily living? No     04/14/2024 1318pm   Spoke with patient bedside on [address]. Patient dressed and has 2 friends bedside to drive him home when ready. Patient ready for discharge and patient states he is ready to go. Patient denies any home going needs.

## 2024-04-14 NOTE — NURSING NOTE
Discharge instructions given to patient. New prescriptions noted and follow up appointments. IV removed, intact. Telemetry returned. No additional questions.

## 2024-04-15 ENCOUNTER — PATIENT OUTREACH (OUTPATIENT)
Dept: PRIMARY CARE | Facility: CLINIC | Age: 84
End: 2024-04-15
Payer: MEDICARE

## 2024-04-15 NOTE — PROGRESS NOTES
Discharge Facility: [facility]  Discharge Diagnosis: Abd pain  Admission Date: 13 Apr 24  Discharge Date: 14 Apr 24    PCP Appointment Date: 16 Apr 24  Specialist Appointment Date: 22 Apr 24 (Cardiology, Chris garcía/ Don Gupta)  Hospital Encounter and Summary: Linked    See discharge assessment below for further details     Engagement  Call Start Time: 1026 (4/15/2024 10:35 AM)    Medications  Medications reviewed with patient/caregiver?: Yes (4/15/2024 10:35 AM)  Is the patient having any side effects they believe may be caused by any medication additions or changes?: No (4/15/2024 10:35 AM)  Does the patient have all medications ordered at discharge?: Yes (4/15/2024 10:35 AM)  Prescription Comments: None (4/15/2024 10:35 AM)  Is the patient taking all medications as directed (includes completed medication regime)?: Yes (4/15/2024 10:35 AM)  Medication Comments: None (4/15/2024 10:35 AM)    Appointments  Does the patient have a primary care provider?: Yes (follow up set by me for 16 Apr 24) (4/15/2024 10:35 AM)  Care Management Interventions: Verified appointment date/time/provider (4/15/2024 10:35 AM)  Has the patient kept scheduled appointments due by today?: Yes (4/15/2024 10:35 AM)  Care Management Interventions: Advised patient to keep appointment (4/15/2024 10:35 AM)    Self Management  What is the home health agency?: N/A (4/15/2024 10:35 AM)  Has home health visited the patient within 72 hours of discharge?: Not applicable (4/15/2024 10:35 AM)  What Durable Medical Equipment (DME) was ordered?: N/A (4/15/2024 10:35 AM)    Patient Teaching  Does the patient have access to their discharge instructions?: Yes (4/15/2024 10:35 AM)  Care Management Interventions: Reviewed instructions with patient (4/15/2024 10:35 AM)  What is the patient's perception of their health status since discharge?: Improving (4/15/2024 10:35 AM)  Is the patient/caregiver able to teach back the hierarchy of who to call/visit for  symptoms/problems? PCP, Specialist, Home Health nurse, Urgent Care, ED, 911: Yes (4/15/2024 10:35 AM)  Patient/Caregiver Education Comments: None (4/15/2024 10:35 AM)    Wrap Up  Wrap Up Additional Comments: None (4/15/2024 10:35 AM)  Call End Time: 1036 (4/15/2024 10:35 AM)     Pt grateful for outreach call and is agreeable to being contacted after PCP appt to see how he is doing.

## 2024-04-16 ENCOUNTER — OFFICE VISIT (OUTPATIENT)
Dept: PRIMARY CARE | Facility: CLINIC | Age: 84
End: 2024-04-16
Payer: MEDICARE

## 2024-04-16 VITALS
DIASTOLIC BLOOD PRESSURE: 66 MMHG | HEIGHT: 70 IN | WEIGHT: 165.25 LBS | HEART RATE: 63 BPM | BODY MASS INDEX: 23.66 KG/M2 | OXYGEN SATURATION: 96 % | SYSTOLIC BLOOD PRESSURE: 117 MMHG

## 2024-04-16 DIAGNOSIS — K40.90 RIGHT INGUINAL HERNIA: ICD-10-CM

## 2024-04-16 DIAGNOSIS — R10.9 ABDOMINAL PAIN, UNSPECIFIED ABDOMINAL LOCATION: Primary | ICD-10-CM

## 2024-04-16 DIAGNOSIS — K80.20 CALCULUS OF GALLBLADDER WITHOUT CHOLECYSTITIS WITHOUT OBSTRUCTION: ICD-10-CM

## 2024-04-16 DIAGNOSIS — K57.90 DIVERTICULOSIS: ICD-10-CM

## 2024-04-16 DIAGNOSIS — N20.0 RIGHT NEPHROLITHIASIS: ICD-10-CM

## 2024-04-16 LAB — BACTERIA UR CULT: NO GROWTH

## 2024-04-16 PROCEDURE — 3074F SYST BP LT 130 MM HG: CPT | Performed by: FAMILY MEDICINE

## 2024-04-16 PROCEDURE — 1159F MED LIST DOCD IN RCRD: CPT | Performed by: FAMILY MEDICINE

## 2024-04-16 PROCEDURE — 1036F TOBACCO NON-USER: CPT | Performed by: FAMILY MEDICINE

## 2024-04-16 PROCEDURE — 99214 OFFICE O/P EST MOD 30 MIN: CPT | Performed by: FAMILY MEDICINE

## 2024-04-16 PROCEDURE — 3078F DIAST BP <80 MM HG: CPT | Performed by: FAMILY MEDICINE

## 2024-04-16 PROCEDURE — 1157F ADVNC CARE PLAN IN RCRD: CPT | Performed by: FAMILY MEDICINE

## 2024-04-16 PROCEDURE — 1160F RVW MEDS BY RX/DR IN RCRD: CPT | Performed by: FAMILY MEDICINE

## 2024-04-16 ASSESSMENT — ENCOUNTER SYMPTOMS
ABDOMINAL PAIN: 1
DIZZINESS: 0
CONFUSION: 0
DIFFICULTY URINATING: 0
SHORTNESS OF BREATH: 0
BLOOD IN STOOL: 0
CHILLS: 0
LIGHT-HEADEDNESS: 0
NAUSEA: 0
VOMITING: 0
WEAKNESS: 0
DIARRHEA: 0
DYSURIA: 0
FEVER: 0
WHEEZING: 0
TROUBLE SWALLOWING: 0
COUGH: 0
UNEXPECTED WEIGHT CHANGE: 0
NUMBNESS: 0

## 2024-04-16 NOTE — PATIENT INSTRUCTIONS
Recommend trying the Pantoprazole for 2 weeks, even though you are feeling better.    Please follow-up with your surgeon regarding specifics of hernia repair options.    Please return or seek medical attention if symptoms persist, change, worsen, or return. For emergencies, call 9-1-1 or go to the nearest Emergency Room.    Avoid taking Biotin for a week prior to any blood tests, as it can interfere with certain results. Fasting for labs means 12 hours, nothing to eat or drink, except water and medications, unless directed otherwise.    For assistance with scheduling referrals or consultations, please call [phone]. For laboratory, radiology, and other tests, please call [phone] ([phone] for pediatrics). Please review prescription inserts and published information for possible adverse effects of all medications. Return after testing or consultation to review results and recommendations, if symptoms persist, change, worsen, or return, or if you have any question or concern. If you do not get results within 7-10 days, or you have any question or concern, please send a message, call the office ([phone]), or return to the office for a follow-up appointment. For non-emergencies, you may call the office. For emergencies, call 9-1-1 or go to the nearest Emergency Department. Please schedule additional appointment(s) to address concern(s) not addressed today.    In general, results are not released or discussed over the telephone, but at an appointment or via  [email or web address]. Results of tests done through [facility] are released via  [email or web address] (see below).  [email or web address]   [email or web address] support line: [phone]

## 2024-04-16 NOTE — PROGRESS NOTES
"Discharge Facility: [facility]  Discharge Diagnosis: Abd pain  Admission Date: 13 Apr 24  Discharge Date: 14 Apr 24    Subjective   Patient ID: Lv Quinteros is a 83 y.o. male who presents for Follow-up (Pt presents in TCM, states feels pretty good, dxed possible ulcer or gallbladder. Discuss having hernia surgery wants to know your opinion on where. BL).  HPI Historian(s): Self    Since ER discharge, has been feeling pretty good. Has not yet started Pantoprazole or Carafate. Symptoms started after a sip of ginger ale plus [place], and after taking aspirin. Right now has no more symptoms.    Review of Systems   Constitutional:  Negative for chills, fever and unexpected weight change.   HENT:  Negative for ear pain and trouble swallowing.    Respiratory:  Negative for cough, shortness of breath and wheezing.    Cardiovascular:  Negative for chest pain.   Gastrointestinal:  Positive for abdominal pain. Negative for blood in stool, diarrhea, nausea and vomiting.   Genitourinary:  Negative for difficulty urinating and dysuria.   Skin:  Negative for rash.   Neurological:  Negative for dizziness, syncope, weakness, light-headedness and numbness.   Psychiatric/Behavioral:  Negative for behavioral problems and confusion.          Objective   /66   Pulse 63   Ht 1.778 m (5' 10\")   Wt 75 kg (165 lb 4 oz)   SpO2 96%   BMI 23.71 kg/m²     Physical Exam  Vitals and nursing note reviewed.   Constitutional:       General: He is not in acute distress.     Appearance: Normal appearance.      Comments: No assistive device presently being used.   HENT:      Head: Normocephalic and atraumatic.   Eyes:      General: No scleral icterus.     Extraocular Movements: Extraocular movements intact.      Conjunctiva/sclera: Conjunctivae normal.   Pulmonary:      Effort: Pulmonary effort is normal. No respiratory distress.   Abdominal:      General: Bowel sounds are normal. There is no distension.      Palpations: Abdomen is soft. " There is no mass.      Tenderness: There is no abdominal tenderness. There is no guarding or rebound.   Skin:     General: Skin is warm and dry.      Coloration: Skin is not jaundiced.   Neurological:      Mental Status: He is alert and oriented to person, place, and time. Mental status is at baseline.   Psychiatric:         Behavior: Behavior normal.         Assessment/Plan   Problem List Items Addressed This Visit       Calculus of gallbladder without cholecystitis without obstruction    Right inguinal hernia     Advised follow-up with general surgery.         Abdominal pain - Primary     Resolved. Try PPI x2w.         Diverticulosis    Right nephrolithiasis

## 2024-04-17 ENCOUNTER — PATIENT OUTREACH (OUTPATIENT)
Dept: PRIMARY CARE | Facility: CLINIC | Age: 84
End: 2024-04-17
Payer: MEDICARE

## 2024-04-17 NOTE — PROGRESS NOTES
Call regarding appt. with PCP on (16 Apr 24) after hospitalization.  At time of outreach call the patient feels as if their condition has improved since last visit.  Reviewed the PCP appointment with the pt and addressed any questions or concerns.

## 2024-05-09 DIAGNOSIS — K40.90 NON-RECURRENT UNILATERAL INGUINAL HERNIA WITHOUT OBSTRUCTION OR GANGRENE: Primary | ICD-10-CM

## 2024-05-13 ENCOUNTER — PATIENT OUTREACH (OUTPATIENT)
Dept: PRIMARY CARE | Facility: CLINIC | Age: 84
End: 2024-05-13
Payer: MEDICARE

## 2024-05-13 NOTE — PROGRESS NOTES
Successful outreach to patient regarding hospitalization as patient continues TCM program.   At time of outreach call the patient feels as if their condition has improved since initial visit with PCP or specialist.  Questions or concerns addressed at this time with patient.   Provided contact information to patient if any further non-emergent needs arise.

## 2024-05-20 ENCOUNTER — PRE-ADMISSION TESTING (OUTPATIENT)
Dept: PREADMISSION TESTING | Facility: HOSPITAL | Age: 84
End: 2024-05-20
Payer: MEDICARE

## 2024-05-20 VITALS
SYSTOLIC BLOOD PRESSURE: 148 MMHG | HEART RATE: 58 BPM | HEIGHT: 70 IN | BODY MASS INDEX: 23.99 KG/M2 | DIASTOLIC BLOOD PRESSURE: 87 MMHG | WEIGHT: 167.55 LBS | RESPIRATION RATE: 16 BRPM | OXYGEN SATURATION: 96 % | TEMPERATURE: 98.4 F

## 2024-05-20 DIAGNOSIS — Z01.818 PREOPERATIVE EXAMINATION: Primary | ICD-10-CM

## 2024-05-20 DIAGNOSIS — K40.90 NON-RECURRENT UNILATERAL INGUINAL HERNIA WITHOUT OBSTRUCTION OR GANGRENE: ICD-10-CM

## 2024-05-20 LAB
ABO GROUP (TYPE) IN BLOOD: NORMAL
ALBUMIN SERPL BCP-MCNC: 3.6 G/DL (ref 3.4–5)
ALP SERPL-CCNC: 38 U/L (ref 33–136)
ALT SERPL W P-5'-P-CCNC: 16 U/L (ref 10–52)
ANION GAP SERPL CALC-SCNC: 10 MMOL/L (ref 10–20)
ANTIBODY SCREEN: NORMAL
AST SERPL W P-5'-P-CCNC: 16 U/L (ref 9–39)
BASOPHILS # BLD AUTO: 0.03 X10*3/UL (ref 0–0.1)
BASOPHILS NFR BLD AUTO: 0.5 %
BILIRUB SERPL-MCNC: 0.7 MG/DL (ref 0–1.2)
BUN SERPL-MCNC: 12 MG/DL (ref 6–23)
CALCIUM SERPL-MCNC: 8.4 MG/DL (ref 8.6–10.3)
CHLORIDE SERPL-SCNC: 103 MMOL/L (ref 98–107)
CO2 SERPL-SCNC: 32 MMOL/L (ref 21–32)
CREAT SERPL-MCNC: 0.8 MG/DL (ref 0.5–1.3)
EGFRCR SERPLBLD CKD-EPI 2021: 88 ML/MIN/1.73M*2
EOSINOPHIL # BLD AUTO: 0.37 X10*3/UL (ref 0–0.4)
EOSINOPHIL NFR BLD AUTO: 6.4 %
ERYTHROCYTE [DISTWIDTH] IN BLOOD BY AUTOMATED COUNT: 12.7 % (ref 11.5–14.5)
GLUCOSE SERPL-MCNC: 89 MG/DL (ref 74–99)
HCT VFR BLD AUTO: 48 % (ref 41–52)
HGB BLD-MCNC: 16.1 G/DL (ref 13.5–17.5)
IMM GRANULOCYTES # BLD AUTO: 0.02 X10*3/UL (ref 0–0.5)
IMM GRANULOCYTES NFR BLD AUTO: 0.3 % (ref 0–0.9)
LYMPHOCYTES # BLD AUTO: 1.09 X10*3/UL (ref 0.8–3)
LYMPHOCYTES NFR BLD AUTO: 18.8 %
MCH RBC QN AUTO: 31.1 PG (ref 26–34)
MCHC RBC AUTO-ENTMCNC: 33.5 G/DL (ref 32–36)
MCV RBC AUTO: 93 FL (ref 80–100)
MONOCYTES # BLD AUTO: 0.53 X10*3/UL (ref 0.05–0.8)
MONOCYTES NFR BLD AUTO: 9.2 %
NEUTROPHILS # BLD AUTO: 3.75 X10*3/UL (ref 1.6–5.5)
NEUTROPHILS NFR BLD AUTO: 64.8 %
NRBC BLD-RTO: 0 /100 WBCS (ref 0–0)
PLATELET # BLD AUTO: 141 X10*3/UL (ref 150–450)
POTASSIUM SERPL-SCNC: 4.2 MMOL/L (ref 3.5–5.3)
PROT SERPL-MCNC: 5.5 G/DL (ref 6.4–8.2)
RBC # BLD AUTO: 5.17 X10*6/UL (ref 4.5–5.9)
RH FACTOR (ANTIGEN D): NORMAL
SODIUM SERPL-SCNC: 141 MMOL/L (ref 136–145)
WBC # BLD AUTO: 5.8 X10*3/UL (ref 4.4–11.3)

## 2024-05-20 PROCEDURE — 36415 COLL VENOUS BLD VENIPUNCTURE: CPT

## 2024-05-20 PROCEDURE — 84075 ASSAY ALKALINE PHOSPHATASE: CPT | Performed by: NURSE PRACTITIONER

## 2024-05-20 PROCEDURE — 86901 BLOOD TYPING SEROLOGIC RH(D): CPT | Performed by: NURSE PRACTITIONER

## 2024-05-20 PROCEDURE — 99203 OFFICE O/P NEW LOW 30 MIN: CPT | Performed by: NURSE PRACTITIONER

## 2024-05-20 PROCEDURE — 85025 COMPLETE CBC W/AUTO DIFF WBC: CPT | Performed by: NURSE PRACTITIONER

## 2024-05-20 PROCEDURE — 87081 CULTURE SCREEN ONLY: CPT | Mod: GEALAB | Performed by: NURSE PRACTITIONER

## 2024-05-20 RX ORDER — CHLORHEXIDINE GLUCONATE ORAL RINSE 1.2 MG/ML
15 SOLUTION DENTAL DAILY
Qty: 30 ML | Refills: 0 | Status: SHIPPED | OUTPATIENT
Start: 2024-05-20 | End: 2024-05-22

## 2024-05-20 RX ORDER — CHLORHEXIDINE GLUCONATE 40 MG/ML
1 SOLUTION TOPICAL DAILY
Qty: 25 ML | Refills: 0 | Status: CANCELLED | OUTPATIENT
Start: 2024-05-20 | End: 2024-05-25

## 2024-05-20 RX ORDER — CHLORHEXIDINE GLUCONATE ORAL RINSE 1.2 MG/ML
15 SOLUTION DENTAL DAILY
Qty: 30 ML | Refills: 0 | Status: CANCELLED | OUTPATIENT
Start: 2024-05-20 | End: 2024-05-22

## 2024-05-20 RX ORDER — CHLORHEXIDINE GLUCONATE 40 MG/ML
1 SOLUTION TOPICAL DAILY
Qty: 25 ML | Refills: 0 | Status: SHIPPED | OUTPATIENT
Start: 2024-05-20 | End: 2024-05-25

## 2024-05-20 ASSESSMENT — ENCOUNTER SYMPTOMS
GASTROINTESTINAL NEGATIVE: 1
MUSCULOSKELETAL NEGATIVE: 1
CONSTITUTIONAL NEGATIVE: 1
NECK NEGATIVE: 1
EYES NEGATIVE: 1
NEUROLOGICAL NEGATIVE: 1
CARDIOVASCULAR NEGATIVE: 1

## 2024-05-20 ASSESSMENT — DUKE ACTIVITY SCORE INDEX (DASI)
CAN YOU PARTICIPATE IN STRENOUS SPORTS LIKE SWIMMING, SINGLES TENNIS, FOOTBALL, BASKETBALL, OR SKIING: NO
DASI METS SCORE: 7.3
CAN YOU DO HEAVY WORK AROUND THE HOUSE LIKE SCRUBBING FLOORS OR LIFTING AND MOVING HEAVY FURNITURE: YES
CAN YOU WALK A BLOCK OR TWO ON LEVEL GROUND: YES
CAN YOU DO YARD WORK LIKE RAKING LEAVES, WEEDING OR PUSHING A MOWER: YES
CAN YOU TAKE CARE OF YOURSELF (EAT, DRESS, BATHE, OR USE TOILET): YES
CAN YOU WALK INDOORS, SUCH AS AROUND YOUR HOUSE: YES
CAN YOU DO MODERATE WORK AROUND THE HOUSE LIKE VACUUMING, SWEEPING FLOORS OR CARRYING GROCERIES: YES
CAN YOU RUN A SHORT DISTANCE: NO
CAN YOU PARTICIPATE IN MODERATE RECREATIONAL ACTIVITIES LIKE GOLF, BOWLING, DANCING, DOUBLES TENNIS OR THROWING A BASEBALL OR FOOTBALL: YES
CAN YOU DO LIGHT WORK AROUND THE HOUSE LIKE DUSTING OR WASHING DISHES: YES
CAN YOU CLIMB A FLIGHT OF STAIRS OR WALK UP A HILL: YES
CAN YOU HAVE SEXUAL RELATIONS: NO
TOTAL_SCORE: 37.45

## 2024-05-20 ASSESSMENT — ACTIVITIES OF DAILY LIVING (ADL): ADL_SCORE: 0

## 2024-05-20 ASSESSMENT — PAIN SCALES - GENERAL: PAINLEVEL_OUTOF10: 0 - NO PAIN

## 2024-05-20 ASSESSMENT — PAIN - FUNCTIONAL ASSESSMENT: PAIN_FUNCTIONAL_ASSESSMENT: 0-10

## 2024-05-20 NOTE — PREPROCEDURE INSTRUCTIONS
Thank you for visiting Preadmission Testing (PAT) today for your pre-procedure evaluation, you were seen by:      Bonnie Morin CNP  Pre Admission Testing  [facility]  [phone]       This summary includes instructions and information to aid you during your perioperative period.  Please read carefully. If you have any questions about your visit today, please call the number listed above.  If you become ill or have any changes to your health before your surgery, please contact your primary care provider and alert your surgeon.    Preparing for your Surgery       Exercises  Preoperative Deep Breathing Exercises  Why it is important to do deep breathing exercises before my surgery?  Deep breathing exercises strengthen your breathing muscles.  This helps you to recover after your surgery and decreases the chance of breathing complications.  How are the deep breathing exercises done?  Sit straight with your back supported.  Breathe in deeply and slowly through your nose. Your lower rib cage should expand and your abdomen may move forward.  Hold that breath for 3 to 5 seconds.  Breathe out through pursed lips, slowly and completely.  Rest and repeat 10 times every hour while awake.  Rest longer if you become dizzy or lightheaded.      Preoperative Brain Exercises    What are brain exercises?  A brain exercise is any activity that engages your thinking (cognitive) skills.    What types of activities are considered brain exercises?  Jigsaw puzzles, crossword puzzles, word jumble, memory games, word search, and many more.  Many can be found free online or on your phone via a mobile rik.    Why should I do brain exercises before my surgery?  More recent research has shown brain exercise before surgery can lower the risk of postoperative delirium (confusion) which can be especially important for older adults.  Patients who did brain exercises for 5 to 10 hours the days before surgery, cut their  risk of postoperative delirium in half up to 1 week after surgery.    Sit-to-Stand Exercise    What is the sit-to-stand exercise?  The sit-to-stand exercise strengthens the muscles of your lower body and muscles in the center of your body (core muscles for stability) helping to maintain and improve your strength and mobility.  How do I do the sit-to-stand exercise?  The goal is to do this exercise without using your arms or hands.  If this is too difficult, use your arms and hands or a chair with armrests to help slowly push yourself to the standing position and lower yourself back to the sitting position. As the movement becomes easier use your arms and hands less.    Steps to the sit-to-stand exercise  Sit up tall in a sturdy chair, knees bent, feet flat on the floor shoulder-width apart.  Shift your hips/pelvis forward in the chair to correctly position yourself for the next movement.  Lean forward at your hips.  Stand up straight putting equal weight on both feet.  Check to be sure you are properly aligned with the chair, in a slow controlled movement sit back down.  Repeat this exercise 10-15 times.  If needed you can do it fewer times until your strength improves.  Rest for 1 minute.  Do another 10-15 sit-to-stand exercises.  Try to do this in the morning and evening.        Instructions    Preoperative Fasting Guidelines    Why must I stop eating and drinking near surgery time?  With sedation, food or liquid in your stomach can enter your lungs causing serious complications  Food can increase nausea and vomiting  When do I need to stop eating and drinking before my surgery?      Do not eat any food or drink any liquids after midnight the night before your surgery/procedure.  You may have sips of water to take medications.            Simple things you can do to help prevent blood clots     Blood clots are blockages that can form in the body's veins. When a blood clot forms in your deep veins, it may be called  a deep vein thrombosis, or DVT for short. Blood clots can happen in any part of the body where blood flows, but they are most common in the arms and legs. If a piece of a blood clot breaks free and travels to the lungs, it is called a pulmonary embolus (PE). A PE can be a very serious problem.         Being in the hospital or having surgery can raise your chances of getting a blood clot because you may not be well enough to move around as much as you normally do.         Ways you can help prevent blood clots in the hospital       Wearing SCDs  SCDs stands for Sequential Compression Devices.   SCDs are special sleeves that wrap around your legs. They attach to a pump that fills them with air to gently squeeze your legs every few minutes.  This helps return the blood in your legs to your heart.   SCDs should only be taken off when walking or bathing. SCDs may not be comfortable, but they can help save your life.              Pump SCD leg sleeves  Wearing compression stockings - if your doctor orders them. These special snug-fitting stockings gently squeeze your legs to help blood flow.       Walking. Walking helps move the blood in your legs.   If your doctor says it is ok, try walking the halls at least   5 times a day. Ask us to help you get up, so you don't fall.      Taking any blood-thinning medicines your doctor orders.              Ways you can help prevent blood clots at home         Wearing compression stockings - if your doctor orders them.   Walking - to help move the blood in your legs.    Taking any blood-thinning medicines your doctor orders.      Signs of a blood clot or PE    Tell your doctor or nurse right away if you have any of the problems listed below.         If you are at home, seek medical care right away. Call 911 for chest pain or problems breathing.            Signs of a blood clot (DVT) - such as pain, swelling, redness, or warmth in your arm or legs.  Signs of a pulmonary embolism (PE) -  "such as chest pain or feeling short of breath      Tobacco and Alcohol;  Do not drink alcohol or smoke within 24 hours of surgery.  It is best to quit smoking for as long as possible before any surgery or procedure.      Other Instructions  Why did I have my nose, under my arms, and groin swabbed? The purpose of the swab is to identify Staphylococcus aureus inside your nose or on your skin.  The swab was sent to the laboratory for culture.  A positive swab/culture for Staphylococcus aureus is called colonization or carriage.     What is Staphylococcus aureus? Staphylococcus aureus, also known as \"staph\", is a germ found on the skin or in the nose of healthy people.  Sometimes Staphylococcus aureus can get into the body and cause an infection.  This can be minor (such as pimples, boils, or other skin problems).  It might also be serious (such as a blood infection, pneumonia, or a surgical site infection).     What is Staphylococcus aureus colonization or carriage? Colonization or carriage means that a person has the germ but is not sick from it.  These bacteria can be spread on the hands or when breathing or sneezing.   How is Staphylococcus aureus spread? It is most often spread by close contact with a person or item that carries it.   What happens if my culture is positive for Staphylococcus aureus? Your doctor/medical team will use this information to guide any antibiotic treatment which may be necessary.  Regardless of the culture results, we will clean the inside of your nose with a betadine swab just before you have your surgery.   Will I get an infection if I have Staphylococcus aureus in my nose or on my skin? Anyone can get an infection with Staphylococcus aureus.  However, the best way to reduce your risk of infection is to follow the instructions provided to you for the use of your CHG soap and dental rinse.      Body Wash:     What is a home preoperative antibacterial shower? This shower is a way of " cleaning the skin with a germ-killing solution before surgery.  The solution contains chlorhexidine, commonly known as CHG.  CHG is a skin cleanser with germ-killing ability.  Let your doctor know if you are allergic to chlorhexidine.   Why do I need to take a preoperative antibacterial shower? Skin is not sterile.  It is best to try to make your skin as free of germs as possible before surgery.  Proper cleansing with a germ-killing soap before surgery can lower the number of germs on your skin.  This helps to reduce the risk of infection at the surgical site.    Following the instructions listed below will help you prepare your skin for surgery.   How do I use the solution? Steps:  Begin using your CHG soap 5 days before your scheduled surgery on ___________.     First, wash and rinse your hair using the CHG soap. Keep CHG soap away from ear canals and eyes.  Rinse completely, do not condition.  Hair extensions should be removed.      Oral/Dental Rinse:     What is oral/dental rinse?  It is a mouthwash. It is a way of cleaning the mouth with a germ-killing solution before your surgery.  The solution contains chlorhexidine, commonly known as CHG. It is used inside the mouth to kill a bacteria known as Staphylococcus aureus.  Let your doctor know if you are allergic to Chlorhexidine.   Why do I need to use CHG oral/dental rinse? The CHG oral/dental rinse helps to kill a bacteria in your mouth known as Staphylococcus aureus.  This reduces the risk of infection at the surgical site.    Using your CHG oral/dental rinse STEPS: Use your CHG oral/dental rinse after you brush your teeth the night before (at bedtime) and the morning of your surgery.  Follow all directions on your prescription label.    Use the cap on the container to measure 15 ml.  Swish (gargle if you can) the mouthwash in your mouth for at least 30 seconds, (do not swallow) and spit out.  After you use your CHG rinse, do not rinse your mouth with water,  drink or eat.    Please refer to the prescription label for the appropriate time to resume oral intake   What side effects might I have using the CHG oral/dental rinse? CHG rinse will stick to plaque on the teeth.  Brush and floss just before use.  Teeth brushing will help avoid staining of plaque during use.        The Week before Surgery        Seven days before Surgery  Check your PAT medication instructions  Do the exercises provided to you by PAT  Arrange for a responsible, adult licensed  to take you home after surgery and stay with you for 24 hours.  You will not be permitted to drive yourself home if you have received any anesthetic/sedation  Six days before surgery  Check your PAT medication instructions  Do the exercises provided to you by PAT  Start using Chlorhexidene (CHG) body wash if prescribed  Five days before surgery  Check your PAT medication instructions  Do the exercises provided to you by PAT  Continue to use CHG body wash if prescribed  Three days before surgery  Check your PAT medication instructions  Do the exercises provided to you by PAT  Continue to use CHG body wash if prescribed  Two days before surgery  Check your PAT medication instructions  Do the exercises provided to you by PAT  Continue to use CHG body wash if prescribed    The Day before Surgery       Check your PAT medication and all other PAT instructions including when to stop eating and drinking  You will be called with your arrival time for surgery in the late afternoon.  If you do not receive a call please reach out to your surgeon's office.  Do not smoke or drink 24 hours before surgery  Prepare items to bring with you to the hospital  Shower with your chlorhexidine wash if prescribed  Brush your teeth and use your chlorhexidine dental rinse if prescribed    The Day of Surgery       Check your PAT medication instructions  Ensure you follow the instructions for when to stop eating and drinking  Shower, if prescribed  use CHG.  Do not apply any lotions, creams, moisturizers, perfume or deodorant  Brush your teeth and use your CHG dental rinse if prescribed  Wear loose comfortable clothing  Avoid make-up  Remove  jewelry and piercings, consider professional piercing removal with a plastic spacer if needed  Bring photo ID and Insurance card  Bring an accurate medication list that includes medication dose, frequency and allergies  Bring a copy of your advanced directives (will, health care power of )  Bring any devices and controllers as well as medical devices you have been provided with for surgery (CPAP, slings, braces, etc.)  Dentures, eyeglasses, and contacts will be removed before surgery, please bring cases for contacts or glasses

## 2024-05-20 NOTE — H&P (VIEW-ONLY)
CPM/PAT Evaluation       Name: Lv Quinteros (Lv Quinteros)  /Age: 1940/83 y.o.     Visit Type:   In-Person       Chief Complaint: Inguinal hernia    HPI 84 y/o female/male scheduled for Right laparoscopic inguinal hernia repair with mesh  on 2024 with  Dr. Baltazar secondary to inguinal hernia w/o obstruction.  PMHX includes pAfib, HTN, CAD, CABGx1, CVA ()  PAT is consulted today for perioperative risk stratification and optimization.      Past Medical History:   Diagnosis Date    Actinic keratosis 2023    Ankylosing spondylitis (Multi)     Arrhythmia     PAF    Arthritis     Cholelithiasis without obstruction 2020    Coronary artery disease     Cough 10/04/2023    COVID-19 10/04/2023    Erythema intertrigo 2023    High prostate specific antigen (PSA) 2019    Hypertension     Inflamed seborrheic keratosis 2023    Melanocytic nevi of unspecified upper limb, including shoulder 2023    Sore throat 10/04/2023    Stroke (Multi)     approx 3 yrs ago       Past Surgical History:   Procedure Laterality Date    CATARACT EXTRACTION      COLONOSCOPY      CORONARY ARTERY BYPASS GRAFT      x1  [facility] yrs ago at [facility]    TONSILLECTOMY         Patient  has no history on file for sexual activity.    Family History   Problem Relation Name Age of Onset    Heart disease Mother      Heart disease Father      Allergies Parent      Heart disease Parent         Allergies   Allergen Reactions    Penicillins Hives, Rash, Shortness of breath and Unknown    Other Other     flushing, near-syncope    Statins-Hmg-Coa Reductase Inhibitors Unknown     muscle weakness on Lipitor; crestor ok       Prior to Admission medications    Medication Sig Start Date End Date Taking? Authorizing Provider   apixaban (Eliquis) 5 mg tablet Take 1 tablet (5 mg) by mouth 2 times a day. 3/25/24 3/25/25  Skye Archibald, APRN-CNP   aspirin 81 mg EC tablet Take 1 tablet (81 mg) by mouth 2 times a day.    Historical  Provider, MD   loratadine (Claritin) 10 mg tablet Take 1 tablet (10 mg) by mouth once daily.    Historical Provider, MD   metoprolol succinate XL (Toprol-XL) 100 mg 24 hr tablet Take 1 tablet (100 mg) by mouth. Take 1 tab in the morning, half tablet in the evening. 6/29/23   Historical Provider, MD   pantoprazole (ProtoNix) 40 mg EC tablet Take 1 tablet (40 mg) by mouth 2 times a day before meals for 28 doses. Do not crush, chew, or split. 4/14/24 4/28/24  Cory Colon MD   sulfacetamide sodium-sulfur 9-4 % cleanser Apply 1 Application topically once daily. 1/12/24   Allan Wallis, APRN-CNP        PAT ROS:   Constitutional:   neg    Neuro/Psych:   neg    Eyes:   neg     use of corrective lenses  Ears:   neg    Nose:   Mouth:   neg    Throat:   neg    Neck:    Has some limited motion when turning to left   neg    Cardio:   neg    Respiratory:   Endocrine:   GI:   neg    :   neg    Musculoskeletal:   neg    Hematologic:   neg    Skin:  neg        Physical Exam  Vitals reviewed.   Constitutional:       Appearance: Normal appearance.   HENT:      Mouth/Throat:      Mouth: Mucous membranes are moist.      Pharynx: Oropharynx is clear.   Eyes:      Pupils: Pupils are equal, round, and reactive to light.   Neck:      Comments: Has some limited motion when turning to left   Cardiovascular:      Rate and Rhythm: Normal rate and regular rhythm.      Pulses: Normal pulses.      Heart sounds: Normal heart sounds.   Pulmonary:      Effort: Pulmonary effort is normal.      Breath sounds: Normal breath sounds.   Abdominal:      General: Bowel sounds are normal.      Palpations: Abdomen is soft.   Musculoskeletal:         General: Normal range of motion.      Cervical back: Normal range of motion and neck supple.   Skin:     General: Skin is warm and dry.   Neurological:      General: No focal deficit present.      Mental Status: He is alert and oriented to person, place, and time.   Psychiatric:         Mood and Affect:  Mood normal.         Behavior: Behavior normal.          PAT AIRWAY:   Airway:     Mallampati::  III    Neck ROM::  Full   Upper implant BL    implants    05/20/24    Visit Vitals  /87   Pulse 58   Temp 36.9 °C (98.4 °F) (Tympanic)   Resp 16       DASI Risk Score      Flowsheet Row Most Recent Value   DASI SCORE 37.45   METS Score (Will be calculated only when all the questions are answered) 7.3          Caprini DVT Assessment      Flowsheet Row Most Recent Value   DVT Score 6   Current Status Major surgery planned, including arthroscopic and laproscopic (1-2 hours)   Age Over 75 years   BMI 30 or less          Modified Frailty Index    No data to display       CHADS2 Stroke Risk  Current as of 8 minutes ago        12.5% 3 to 100%: High Risk   2 to < 3%: Medium Risk   0 to < 2%: Low Risk     Last Change:           This score determines the patient's risk of having a stroke if the patient has atrial fibrillation.          Points Metrics   0 Has Congestive Heart Failure:  No     Patients with congestive heart failure get 1 point.    Current as of 8 minutes ago   1 Has Hypertension:  Yes     Patients with hypertension get 1 point.    Current as of 8 minutes ago   1 Age:  83     Patients who are 75 years of age or older get 1 point.    Current as of 8 minutes ago   1 Has Diabetes:  Yes     Patients with diabetes get 1 point.    Current as of 8 minutes ago   2 Had Stroke:  Yes  Had TIA:  No  Had Thromboembolism:  No     Patients who have had a stroke, TIA, or thromboembolism get 2 points.    Current as of 8 minutes ago             Revised Cardiac Risk Index    No data to display       Apfel Simplified Score    No data to display       Risk Analysis Index Results This Encounter         5/20/2024  0903             PICHARDO Cancer History: Patient does not indicate history of cancer    Total Risk Analysis Index Score Without Cancer: 29    Total Risk Analysis Index Score: 29          Stop Bang Score      Flowsheet Row Most  Recent Value   Do you snore loudly? 0   Do you often feel tired or fatigued after your sleep? 0   Has anyone ever observed you stop breathing in your sleep? 0   Do you have or are you being treated for high blood pressure? 1   Recent BMI (Calculated) 23.7   Is BMI greater than 35 kg/m2? 0=No   Age older than 50 years old? 1=Yes   Is your neck circumference greater than 17 inches (Male) or 16 inches (Female)? 0   Gender - Male 1=Yes   STOP-BANG Total Score 3                Assessment and Plan:     HPI 82 y/o female/male scheduled for Right laparoscopic inguinal hernia repair with mesh  on 6/12/2024 with  Dr. Baltazar secondary to inguinal hernia w/o obstruction.  PMHX includes pAfib, HTN, CAD, CABGx1, CVA (2021)  PAT is consulted today for perioperative risk stratification and optimization.    Neuro:  Stroke in 2022 with some sight residual  age, Patient instructed on and provided cognitive exercises  Patient is at increased risk for perioperative CVA secondary to  previous CVA/TIA, Afib, HTN, increased age    HEENT:  No HEENT diagnosis or significant findings on chart review or clinical presentation and evaluation. No further preoperative testing/intervention indicated at this time.    Cardiovascular:  Follows with Cardiology at [facility] Dr. Perez - Last seen 4/22/2024 for preop exam.  Mets of 4; able to climb 2 flights w/o issue  Nuclear Stress 4/14/2024:  EF 67%, no inducible ischemia, normal LV/RV  Not on AC 2/2 rash -  Currently taking ASA 81mg BID - will continue due to Cardiac hx/Stroke   HTN:  Continue Metoprolol XL 100mg   No further cardiac workup indicated at this time.    METS: 7.3  RCRI: 1 point, 6.0% risk for postoperative MACE   KEELY: 0.1% risk for 30 day postoperative MACE  EKG - As above    Pulmonary:  No pulmonary diagnosis, however patient is at increased risk of perioperative complications secondary to age > 60  Chest Xray 4/13/2024:  mild vascular congestion with streaky perihilar opacities.     Stop  Bang score is 3 placing patient at moderate risk for EMILIO  ARISCAT: <26 points, 1.6% risk of in-hospital postoperative pulmonary complication  PRODIGY: High risk for opioid induced respiratory depression  Pumonary education discussed, patient also provided deep breathing exercises educational handout    Renal:   No renal diagnosis, however patient is at increase risk for perioperative renal complications secondary to  Age equal to or greater than 56, HTN      Endocrine:  No endocrine diagnosis or significant findings on chart review or clinical presentation and evaluation. No further testing or intervention is indicated at this time.    Hematologic:  No hematologic diagnosis, however patient is at an increased risk for DVT  Caprini Score 4, patient at High risk for perioperative DVT.  Patient provided with VTE education/handout.    Gastrointestinal:   No GI diagnosis or significant findings on chart review or clinical presentation and evaluation.   Apfel 2    Infectious disease:   No infectious diagnosis or significant findings on chart review or clinical presentation and evaluation.   Prescription provided for CHG body wash and dental rinse. CHG use instructions reviewed and provided to patient.  Staph screen: MRSA    Musculoskeletal:   No diagnosis or significant findings on chart review or clinical presentation and evaluation.     Anesthesia/Airway:  No anesthesia complications      Medication instructions and NPO guidelines reviewed with the patient.  All questions or concerns discussed and addressed.      Recent Results (from the past 168 hour(s))   CBC and Auto Differential    Collection Time: 05/20/24  9:59 AM   Result Value Ref Range    WBC 5.8 4.4 - 11.3 x10*3/uL    nRBC 0.0 0.0 - 0.0 /100 WBCs    RBC 5.17 4.50 - 5.90 x10*6/uL    Hemoglobin 16.1 13.5 - 17.5 g/dL    Hematocrit 48.0 41.0 - 52.0 %    MCV 93 80 - 100 fL    MCH 31.1 26.0 - 34.0 pg    MCHC 33.5 32.0 - 36.0 g/dL    RDW 12.7 11.5 - 14.5 %     Platelets 141 (L) 150 - 450 x10*3/uL    Neutrophils % 64.8 40.0 - 80.0 %    Immature Granulocytes %, Automated 0.3 0.0 - 0.9 %    Lymphocytes % 18.8 13.0 - 44.0 %    Monocytes % 9.2 2.0 - 10.0 %    Eosinophils % 6.4 0.0 - 6.0 %    Basophils % 0.5 0.0 - 2.0 %    Neutrophils Absolute 3.75 1.60 - 5.50 x10*3/uL    Immature Granulocytes Absolute, Automated 0.02 0.00 - 0.50 x10*3/uL    Lymphocytes Absolute 1.09 0.80 - 3.00 x10*3/uL    Monocytes Absolute 0.53 0.05 - 0.80 x10*3/uL    Eosinophils Absolute 0.37 0.00 - 0.40 x10*3/uL    Basophils Absolute 0.03 0.00 - 0.10 x10*3/uL   Comprehensive Metabolic Panel    Collection Time: 05/20/24  9:59 AM   Result Value Ref Range    Glucose 89 74 - 99 mg/dL    Sodium 141 136 - 145 mmol/L    Potassium 4.2 3.5 - 5.3 mmol/L    Chloride 103 98 - 107 mmol/L    Bicarbonate 32 21 - 32 mmol/L    Anion Gap 10 10 - 20 mmol/L    Urea Nitrogen 12 6 - 23 mg/dL    Creatinine 0.80 0.50 - 1.30 mg/dL    eGFR 88 >60 mL/min/1.73m*2    Calcium 8.4 (L) 8.6 - 10.3 mg/dL    Albumin 3.6 3.4 - 5.0 g/dL    Alkaline Phosphatase 38 33 - 136 U/L    Total Protein 5.5 (L) 6.4 - 8.2 g/dL    AST 16 9 - 39 U/L    Bilirubin, Total 0.7 0.0 - 1.2 mg/dL    ALT 16 10 - 52 U/L   Type And Screen    Collection Time: 05/20/24  9:59 AM   Result Value Ref Range    ABO TYPE A     Rh TYPE POS     ANTIBODY SCREEN NEG

## 2024-05-20 NOTE — CPM/PAT H&P
CPM/PAT Evaluation       Name: Lv Quinteros (Lv Quinteros)  /Age: 1940/83 y.o.     Visit Type:   In-Person       Chief Complaint: Inguinal hernia    HPI 84 y/o female/male scheduled for Right laparoscopic inguinal hernia repair with mesh  on 2024 with  Dr. Baltazar secondary to inguinal hernia w/o obstruction.  PMHX includes pAfib, HTN, CAD, CABGx1, CVA ()  PAT is consulted today for perioperative risk stratification and optimization.      Past Medical History:   Diagnosis Date    Actinic keratosis 2023    Ankylosing spondylitis (Multi)     Arrhythmia     PAF    Arthritis     Cholelithiasis without obstruction 2020    Coronary artery disease     Cough 10/04/2023    COVID-19 10/04/2023    Erythema intertrigo 2023    High prostate specific antigen (PSA) 2019    Hypertension     Inflamed seborrheic keratosis 2023    Melanocytic nevi of unspecified upper limb, including shoulder 2023    Sore throat 10/04/2023    Stroke (Multi)     approx 3 yrs ago       Past Surgical History:   Procedure Laterality Date    CATARACT EXTRACTION      COLONOSCOPY      CORONARY ARTERY BYPASS GRAFT      x1  20 yrs ago at [facility]    TONSILLECTOMY         Patient  has no history on file for sexual activity.    Family History   Problem Relation Name Age of Onset    Heart disease Mother      Heart disease Father      Allergies Parent      Heart disease Parent         Allergies   Allergen Reactions    Penicillins Hives, Rash, Shortness of breath and Unknown    Other Other     flushing, near-syncope    Statins-Hmg-Coa Reductase Inhibitors Unknown     muscle weakness on Lipitor; crestor ok       Prior to Admission medications    Medication Sig Start Date End Date Taking? Authorizing Provider   apixaban (Eliquis) 5 mg tablet Take 1 tablet (5 mg) by mouth 2 times a day. 3/25/24 3/25/25  Skye Archibald, APRN-CNP   aspirin 81 mg EC tablet Take 1 tablet (81 mg) by mouth 2 times a day.    Historical  Provider, MD   loratadine (Claritin) 10 mg tablet Take 1 tablet (10 mg) by mouth once daily.    Historical Provider, MD   metoprolol succinate XL (Toprol-XL) 100 mg 24 hr tablet Take 1 tablet (100 mg) by mouth. Take 1 tab in the morning, half tablet in the evening. 6/29/23   Historical Provider, MD   pantoprazole (ProtoNix) 40 mg EC tablet Take 1 tablet (40 mg) by mouth 2 times a day before meals for 28 doses. Do not crush, chew, or split. 4/14/24 4/28/24  Cory Colon MD   sulfacetamide sodium-sulfur 9-4 % cleanser Apply 1 Application topically once daily. 1/12/24   Allan Wallis, APRN-CNP        PAT ROS:   Constitutional:   neg    Neuro/Psych:   neg    Eyes:   neg     use of corrective lenses  Ears:   neg    Nose:   Mouth:   neg    Throat:   neg    Neck:    Has some limited motion when turning to left   neg    Cardio:   neg    Respiratory:   Endocrine:   GI:   neg    :   neg    Musculoskeletal:   neg    Hematologic:   neg    Skin:  neg        Physical Exam  Vitals reviewed.   Constitutional:       Appearance: Normal appearance.   HENT:      Mouth/Throat:      Mouth: Mucous membranes are moist.      Pharynx: Oropharynx is clear.   Eyes:      Pupils: Pupils are equal, round, and reactive to light.   Neck:      Comments: Has some limited motion when turning to left   Cardiovascular:      Rate and Rhythm: Normal rate and regular rhythm.      Pulses: Normal pulses.      Heart sounds: Normal heart sounds.   Pulmonary:      Effort: Pulmonary effort is normal.      Breath sounds: Normal breath sounds.   Abdominal:      General: Bowel sounds are normal.      Palpations: Abdomen is soft.   Musculoskeletal:         General: Normal range of motion.      Cervical back: Normal range of motion and neck supple.   Skin:     General: Skin is warm and dry.   Neurological:      General: No focal deficit present.      Mental Status: He is alert and oriented to person, place, and time.   Psychiatric:         Mood and Affect:  Mood normal.         Behavior: Behavior normal.          PAT AIRWAY:   Airway:     Mallampati::  III    Neck ROM::  Full   Upper implant BL    implants    05/20/24    Visit Vitals  /87   Pulse 58   Temp 36.9 °C (98.4 °F) (Tympanic)   Resp 16       DASI Risk Score      Flowsheet Row Most Recent Value   DASI SCORE 37.45   METS Score (Will be calculated only when all the questions are answered) 7.3          Caprini DVT Assessment      Flowsheet Row Most Recent Value   DVT Score 6   Current Status Major surgery planned, including arthroscopic and laproscopic (1-2 hours)   Age Over 75 years   BMI 30 or less          Modified Frailty Index    No data to display       CHADS2 Stroke Risk  Current as of 8 minutes ago        12.5% 3 to 100%: High Risk   2 to < 3%: Medium Risk   0 to < 2%: Low Risk     Last Change:           This score determines the patient's risk of having a stroke if the patient has atrial fibrillation.          Points Metrics   0 Has Congestive Heart Failure:  No     Patients with congestive heart failure get 1 point.    Current as of 8 minutes ago   1 Has Hypertension:  Yes     Patients with hypertension get 1 point.    Current as of 8 minutes ago   1 Age:  83     Patients who are 75 years of age or older get 1 point.    Current as of 8 minutes ago   1 Has Diabetes:  Yes     Patients with diabetes get 1 point.    Current as of 8 minutes ago   2 Had Stroke:  Yes  Had TIA:  No  Had Thromboembolism:  No     Patients who have had a stroke, TIA, or thromboembolism get 2 points.    Current as of 8 minutes ago             Revised Cardiac Risk Index    No data to display       Apfel Simplified Score    No data to display       Risk Analysis Index Results This Encounter         5/20/2024  0903             PICHARDO Cancer History: Patient does not indicate history of cancer    Total Risk Analysis Index Score Without Cancer: 29    Total Risk Analysis Index Score: 29          Stop Bang Score      Flowsheet Row Most  Recent Value   Do you snore loudly? 0   Do you often feel tired or fatigued after your sleep? 0   Has anyone ever observed you stop breathing in your sleep? 0   Do you have or are you being treated for high blood pressure? 1   Recent BMI (Calculated) 23.7   Is BMI greater than 35 kg/m2? 0=No   Age older than 50 years old? 1=Yes   Is your neck circumference greater than 17 inches (Male) or 16 inches (Female)? 0   Gender - Male 1=Yes   STOP-BANG Total Score 3                Assessment and Plan:     HPI 82 y/o female/male scheduled for Right laparoscopic inguinal hernia repair with mesh  on 6/12/2024 with  Dr. Baltazar secondary to inguinal hernia w/o obstruction.  PMHX includes pAfib, HTN, CAD, CABGx1, CVA (2021)  PAT is consulted today for perioperative risk stratification and optimization.    Neuro:  Stroke in 2022 with some sight residual  age, Patient instructed on and provided cognitive exercises  Patient is at increased risk for perioperative CVA secondary to  previous CVA/TIA, Afib, HTN, increased age    HEENT:  No HEENT diagnosis or significant findings on chart review or clinical presentation and evaluation. No further preoperative testing/intervention indicated at this time.    Cardiovascular:  Follows with Cardiology at [facility] Dr. Perez - Last seen 4/22/2024 for preop exam.  Mets of 4; able to climb 2 flights w/o issue  Nuclear Stress 4/14/2024:  EF 67%, no inducible ischemia, normal LV/RV  Not on AC 2/2 rash -  Currently taking ASA 81mg BID - will continue due to Cardiac hx/Stroke   HTN:  Continue Metoprolol XL 100mg   No further cardiac workup indicated at this time.    METS: 7.3  RCRI: 1 point, 6.0% risk for postoperative MACE   KEELY: 0.1% risk for 30 day postoperative MACE  EKG - As above    Pulmonary:  No pulmonary diagnosis, however patient is at increased risk of perioperative complications secondary to age > 60  Chest Xray 4/13/2024:  mild vascular congestion with streaky perihilar opacities.     Stop  Bang score is 3 placing patient at moderate risk for EMILIO  ARISCAT: <26 points, 1.6% risk of in-hospital postoperative pulmonary complication  PRODIGY: High risk for opioid induced respiratory depression  Pumonary education discussed, patient also provided deep breathing exercises educational handout    Renal:   No renal diagnosis, however patient is at increase risk for perioperative renal complications secondary to  Age equal to or greater than 56, HTN      Endocrine:  No endocrine diagnosis or significant findings on chart review or clinical presentation and evaluation. No further testing or intervention is indicated at this time.    Hematologic:  No hematologic diagnosis, however patient is at an increased risk for DVT  Caprini Score 4, patient at High risk for perioperative DVT.  Patient provided with VTE education/handout.    Gastrointestinal:   No GI diagnosis or significant findings on chart review or clinical presentation and evaluation.   Apfel 2    Infectious disease:   No infectious diagnosis or significant findings on chart review or clinical presentation and evaluation.   Prescription provided for CHG body wash and dental rinse. CHG use instructions reviewed and provided to patient.  Staph screen: MRSA    Musculoskeletal:   No diagnosis or significant findings on chart review or clinical presentation and evaluation.     Anesthesia/Airway:  No anesthesia complications      Medication instructions and NPO guidelines reviewed with the patient.  All questions or concerns discussed and addressed.      Recent Results (from the past 168 hour(s))   CBC and Auto Differential    Collection Time: 05/20/24  9:59 AM   Result Value Ref Range    WBC 5.8 4.4 - 11.3 x10*3/uL    nRBC 0.0 0.0 - 0.0 /100 WBCs    RBC 5.17 4.50 - 5.90 x10*6/uL    Hemoglobin 16.1 13.5 - 17.5 g/dL    Hematocrit 48.0 41.0 - 52.0 %    MCV 93 80 - 100 fL    MCH 31.1 26.0 - 34.0 pg    MCHC 33.5 32.0 - 36.0 g/dL    RDW 12.7 11.5 - 14.5 %     Platelets 141 (L) 150 - 450 x10*3/uL    Neutrophils % 64.8 40.0 - 80.0 %    Immature Granulocytes %, Automated 0.3 0.0 - 0.9 %    Lymphocytes % 18.8 13.0 - 44.0 %    Monocytes % 9.2 2.0 - 10.0 %    Eosinophils % 6.4 0.0 - 6.0 %    Basophils % 0.5 0.0 - 2.0 %    Neutrophils Absolute 3.75 1.60 - 5.50 x10*3/uL    Immature Granulocytes Absolute, Automated 0.02 0.00 - 0.50 x10*3/uL    Lymphocytes Absolute 1.09 0.80 - 3.00 x10*3/uL    Monocytes Absolute 0.53 0.05 - 0.80 x10*3/uL    Eosinophils Absolute 0.37 0.00 - 0.40 x10*3/uL    Basophils Absolute 0.03 0.00 - 0.10 x10*3/uL   Comprehensive Metabolic Panel    Collection Time: 05/20/24  9:59 AM   Result Value Ref Range    Glucose 89 74 - 99 mg/dL    Sodium 141 136 - 145 mmol/L    Potassium 4.2 3.5 - 5.3 mmol/L    Chloride 103 98 - 107 mmol/L    Bicarbonate 32 21 - 32 mmol/L    Anion Gap 10 10 - 20 mmol/L    Urea Nitrogen 12 6 - 23 mg/dL    Creatinine 0.80 0.50 - 1.30 mg/dL    eGFR 88 >60 mL/min/1.73m*2    Calcium 8.4 (L) 8.6 - 10.3 mg/dL    Albumin 3.6 3.4 - 5.0 g/dL    Alkaline Phosphatase 38 33 - 136 U/L    Total Protein 5.5 (L) 6.4 - 8.2 g/dL    AST 16 9 - 39 U/L    Bilirubin, Total 0.7 0.0 - 1.2 mg/dL    ALT 16 10 - 52 U/L   Type And Screen    Collection Time: 05/20/24  9:59 AM   Result Value Ref Range    ABO TYPE A     Rh TYPE POS     ANTIBODY SCREEN NEG

## 2024-05-22 LAB — STAPHYLOCOCCUS SPEC CULT: NORMAL

## 2024-05-30 ENCOUNTER — TELEPHONE (OUTPATIENT)
Dept: SURGERY | Facility: CLINIC | Age: 84
End: 2024-05-30
Payer: MEDICARE

## 2024-05-30 NOTE — TELEPHONE ENCOUNTER
Patient and Friend Ciarra came in person to office on 5-8. Set up surgery with patient for Lap right inguinal hernia repair. He stated specifically he does not want to set surgery up for gallbladder. I told him we would not set up for surgery for gallbladder due to Dr. Baltazar has not evaluated him for that. We have only seen him for right inguinal hernia and that is the only surgery we are planning for 6-12 to be done. On 5-22 I received a call from the CMO Dr. Dumont of [facility] and she asked how the surgery had gotten set up up from our office. I told her above information regarding that I sat down and spoke to Ciarra his friend and patient in person to set up only for lap right inguinal hernia. Patient states when I called him today 5-30 to confirm  surgery on 6-12- that in Pre Admission they mention that he is set up for gallbladder removal and I stated to him that is untrue, that he is only set up for lap right inguinal hernia with Dr. Baltazar and no other surgery for gallbladder with be set up until the patient sees Dr. Baltazar and is evaluated for this to be done in future if patient decides to get done. I have spoken to patient on 5-30 to confirm surgery for 6-12 for ONLY lap right inguinal hernia repair. He voiced understanding and question he had where asked and answered and he is still on for 6-12 for surgery with Dr. Baltazar.

## 2024-06-10 ENCOUNTER — ANESTHESIA EVENT (OUTPATIENT)
Dept: OPERATING ROOM | Facility: HOSPITAL | Age: 84
End: 2024-06-10
Payer: MEDICARE

## 2024-06-11 RX ORDER — SODIUM CHLORIDE, SODIUM LACTATE, POTASSIUM CHLORIDE, CALCIUM CHLORIDE 600; 310; 30; 20 MG/100ML; MG/100ML; MG/100ML; MG/100ML
100 INJECTION, SOLUTION INTRAVENOUS CONTINUOUS
Status: CANCELLED | OUTPATIENT
Start: 2024-06-11

## 2024-06-11 RX ORDER — OXYCODONE HYDROCHLORIDE 5 MG/1
5 TABLET ORAL EVERY 4 HOURS PRN
Status: CANCELLED | OUTPATIENT
Start: 2024-06-11

## 2024-06-11 RX ORDER — DROPERIDOL 2.5 MG/ML
0.62 INJECTION, SOLUTION INTRAMUSCULAR; INTRAVENOUS ONCE AS NEEDED
Status: CANCELLED | OUTPATIENT
Start: 2024-06-11

## 2024-06-11 RX ORDER — ONDANSETRON HYDROCHLORIDE 2 MG/ML
4 INJECTION, SOLUTION INTRAVENOUS ONCE AS NEEDED
Status: CANCELLED | OUTPATIENT
Start: 2024-06-11

## 2024-06-12 ENCOUNTER — PHARMACY VISIT (OUTPATIENT)
Dept: PHARMACY | Facility: CLINIC | Age: 84
End: 2024-06-12
Payer: MEDICARE

## 2024-06-12 ENCOUNTER — ANESTHESIA (OUTPATIENT)
Dept: OPERATING ROOM | Facility: HOSPITAL | Age: 84
End: 2024-06-12
Payer: MEDICARE

## 2024-06-12 ENCOUNTER — HOSPITAL ENCOUNTER (OUTPATIENT)
Facility: HOSPITAL | Age: 84
Setting detail: OUTPATIENT SURGERY
Discharge: HOME | End: 2024-06-12
Attending: SURGERY | Admitting: SURGERY
Payer: MEDICARE

## 2024-06-12 VITALS
RESPIRATION RATE: 20 BRPM | OXYGEN SATURATION: 93 % | TEMPERATURE: 97.7 F | HEART RATE: 56 BPM | HEIGHT: 70 IN | SYSTOLIC BLOOD PRESSURE: 121 MMHG | DIASTOLIC BLOOD PRESSURE: 61 MMHG | WEIGHT: 161.82 LBS | BODY MASS INDEX: 23.17 KG/M2

## 2024-06-12 DIAGNOSIS — K40.90 NON-RECURRENT UNILATERAL INGUINAL HERNIA WITHOUT OBSTRUCTION OR GANGRENE: Primary | ICD-10-CM

## 2024-06-12 PROCEDURE — 2500000001 HC RX 250 WO HCPCS SELF ADMINISTERED DRUGS (ALT 637 FOR MEDICARE OP): Performed by: SURGERY

## 2024-06-12 PROCEDURE — C1781 MESH (IMPLANTABLE): HCPCS | Performed by: SURGERY

## 2024-06-12 PROCEDURE — 7100000002 HC RECOVERY ROOM TIME - EACH INCREMENTAL 1 MINUTE: Performed by: SURGERY

## 2024-06-12 PROCEDURE — 3600000008 HC OR TIME - EACH INCREMENTAL 1 MINUTE - PROCEDURE LEVEL THREE: Performed by: SURGERY

## 2024-06-12 PROCEDURE — 7100000001 HC RECOVERY ROOM TIME - INITIAL BASE CHARGE: Performed by: SURGERY

## 2024-06-12 PROCEDURE — 7100000009 HC PHASE TWO TIME - INITIAL BASE CHARGE: Performed by: SURGERY

## 2024-06-12 PROCEDURE — 2780000003 HC OR 278 NO HCPCS: Performed by: SURGERY

## 2024-06-12 PROCEDURE — 2500000005 HC RX 250 GENERAL PHARMACY W/O HCPCS: Performed by: NURSE ANESTHETIST, CERTIFIED REGISTERED

## 2024-06-12 PROCEDURE — 2500000004 HC RX 250 GENERAL PHARMACY W/ HCPCS (ALT 636 FOR OP/ED): Performed by: ANESTHESIOLOGY

## 2024-06-12 PROCEDURE — 2500000004 HC RX 250 GENERAL PHARMACY W/ HCPCS (ALT 636 FOR OP/ED): Performed by: SURGERY

## 2024-06-12 PROCEDURE — 49650 LAP ING HERNIA REPAIR INIT: CPT | Performed by: SURGERY

## 2024-06-12 PROCEDURE — 2720000007 HC OR 272 NO HCPCS: Performed by: SURGERY

## 2024-06-12 PROCEDURE — 7100000010 HC PHASE TWO TIME - EACH INCREMENTAL 1 MINUTE: Performed by: SURGERY

## 2024-06-12 PROCEDURE — 3700000001 HC GENERAL ANESTHESIA TIME - INITIAL BASE CHARGE: Performed by: SURGERY

## 2024-06-12 PROCEDURE — 96372 THER/PROPH/DIAG INJ SC/IM: CPT | Performed by: SURGERY

## 2024-06-12 PROCEDURE — 3600000003 HC OR TIME - INITIAL BASE CHARGE - PROCEDURE LEVEL THREE: Performed by: SURGERY

## 2024-06-12 PROCEDURE — RXMED WILLOW AMBULATORY MEDICATION CHARGE

## 2024-06-12 PROCEDURE — 3700000002 HC GENERAL ANESTHESIA TIME - EACH INCREMENTAL 1 MINUTE: Performed by: SURGERY

## 2024-06-12 PROCEDURE — 2500000004 HC RX 250 GENERAL PHARMACY W/ HCPCS (ALT 636 FOR OP/ED): Performed by: NURSE ANESTHETIST, CERTIFIED REGISTERED

## 2024-06-12 DEVICE — 3DMAX LIGHT MESH, RIGHT, LARGE
Type: IMPLANTABLE DEVICE | Site: ABDOMEN | Status: FUNCTIONAL
Brand: 3DMAX LIGHT MESH

## 2024-06-12 RX ORDER — ENOXAPARIN SODIUM 100 MG/ML
30 INJECTION SUBCUTANEOUS ONCE
Status: COMPLETED | OUTPATIENT
Start: 2024-06-12 | End: 2024-06-12

## 2024-06-12 RX ORDER — ACETAMINOPHEN 325 MG/1
650 TABLET ORAL ONCE
Status: COMPLETED | OUTPATIENT
Start: 2024-06-12 | End: 2024-06-12

## 2024-06-12 RX ORDER — BUPIVACAINE HYDROCHLORIDE 5 MG/ML
INJECTION, SOLUTION EPIDURAL; INTRACAUDAL AS NEEDED
Status: DISCONTINUED | OUTPATIENT
Start: 2024-06-12 | End: 2024-06-12 | Stop reason: HOSPADM

## 2024-06-12 RX ORDER — POLYETHYLENE GLYCOL 3350 17 G/17G
17 POWDER, FOR SOLUTION ORAL DAILY
Qty: 238 G | Refills: 0 | Status: SHIPPED | OUTPATIENT
Start: 2024-06-12 | End: 2024-06-26

## 2024-06-12 RX ORDER — ONDANSETRON HYDROCHLORIDE 2 MG/ML
INJECTION, SOLUTION INTRAVENOUS AS NEEDED
Status: DISCONTINUED | OUTPATIENT
Start: 2024-06-12 | End: 2024-06-12

## 2024-06-12 RX ORDER — CEFAZOLIN 1 G/1
INJECTION, POWDER, FOR SOLUTION INTRAVENOUS AS NEEDED
Status: DISCONTINUED | OUTPATIENT
Start: 2024-06-12 | End: 2024-06-12

## 2024-06-12 RX ORDER — ROCURONIUM BROMIDE 10 MG/ML
INJECTION, SOLUTION INTRAVENOUS AS NEEDED
Status: DISCONTINUED | OUTPATIENT
Start: 2024-06-12 | End: 2024-06-12

## 2024-06-12 RX ORDER — KETOROLAC TROMETHAMINE 30 MG/ML
INJECTION, SOLUTION INTRAMUSCULAR; INTRAVENOUS AS NEEDED
Status: DISCONTINUED | OUTPATIENT
Start: 2024-06-12 | End: 2024-06-12

## 2024-06-12 RX ORDER — GABAPENTIN 300 MG/1
300 CAPSULE ORAL ONCE
Status: COMPLETED | OUTPATIENT
Start: 2024-06-12 | End: 2024-06-12

## 2024-06-12 RX ORDER — FENTANYL CITRATE 50 UG/ML
INJECTION, SOLUTION INTRAMUSCULAR; INTRAVENOUS AS NEEDED
Status: DISCONTINUED | OUTPATIENT
Start: 2024-06-12 | End: 2024-06-12

## 2024-06-12 RX ORDER — SODIUM CHLORIDE, SODIUM LACTATE, POTASSIUM CHLORIDE, CALCIUM CHLORIDE 600; 310; 30; 20 MG/100ML; MG/100ML; MG/100ML; MG/100ML
100 INJECTION, SOLUTION INTRAVENOUS CONTINUOUS
Status: DISCONTINUED | OUTPATIENT
Start: 2024-06-12 | End: 2024-06-12 | Stop reason: HOSPADM

## 2024-06-12 RX ORDER — OXYCODONE HYDROCHLORIDE 5 MG/1
5 TABLET ORAL EVERY 6 HOURS PRN
Qty: 5 TABLET | Refills: 0 | Status: SHIPPED | OUTPATIENT
Start: 2024-06-12

## 2024-06-12 RX ORDER — PROPOFOL 10 MG/ML
INJECTION, EMULSION INTRAVENOUS AS NEEDED
Status: DISCONTINUED | OUTPATIENT
Start: 2024-06-12 | End: 2024-06-12

## 2024-06-12 RX ORDER — LIDOCAINE HCL/PF 100 MG/5ML
SYRINGE (ML) INTRAVENOUS AS NEEDED
Status: DISCONTINUED | OUTPATIENT
Start: 2024-06-12 | End: 2024-06-12

## 2024-06-12 RX ORDER — IBUPROFEN 600 MG/1
600 TABLET ORAL EVERY 6 HOURS
Qty: 10 TABLET | Refills: 0 | Status: SHIPPED | OUTPATIENT
Start: 2024-06-12 | End: 2024-06-15

## 2024-06-12 RX ORDER — ACETAMINOPHEN 325 MG/1
650 TABLET ORAL EVERY 6 HOURS
Qty: 20 TABLET | Refills: 0 | Status: SHIPPED | OUTPATIENT
Start: 2024-06-12 | End: 2024-06-15

## 2024-06-12 RX ORDER — NORETHINDRONE AND ETHINYL ESTRADIOL 0.5-0.035
KIT ORAL AS NEEDED
Status: DISCONTINUED | OUTPATIENT
Start: 2024-06-12 | End: 2024-06-12

## 2024-06-12 RX ORDER — ONDANSETRON 4 MG/1
4 TABLET, ORALLY DISINTEGRATING ORAL EVERY 6 HOURS PRN
Qty: 15 TABLET | Refills: 0 | Status: SHIPPED | OUTPATIENT
Start: 2024-06-12

## 2024-06-12 SDOH — HEALTH STABILITY: MENTAL HEALTH: CURRENT SMOKER: 0

## 2024-06-12 ASSESSMENT — PAIN - FUNCTIONAL ASSESSMENT
PAIN_FUNCTIONAL_ASSESSMENT: 0-10
PAIN_FUNCTIONAL_ASSESSMENT: UNABLE TO SELF-REPORT

## 2024-06-12 ASSESSMENT — PAIN SCALES - GENERAL: PAINLEVEL_OUTOF10: 0 - NO PAIN

## 2024-06-12 NOTE — ANESTHESIA PREPROCEDURE EVALUATION
Patient: Lv Quinteros    Procedure Information       Date/Time: 06/12/24 1345    Procedure: LAPAROSCOPIC RIGHT INGUINAL HERNIA REPAIR WITH MESH (Right) - laparoscopic right inguinal hernia repair with mesh.    Location: [facility] OR 06 / Virtual [facility] OR    Surgeons: Adair Baltazar MD          Vitals:    06/12/24 1230   BP: 167/73   Pulse: 60   Resp: 16   Temp: 36.9 °C (98.4 °F)   SpO2: (!) 9%       Past Surgical History:   Procedure Laterality Date    CATARACT EXTRACTION      COLONOSCOPY      CORONARY ARTERY BYPASS GRAFT      x1  20 yrs ago at [facility]    TONSILLECTOMY       Past Medical History:   Diagnosis Date    Actinic keratosis 05/09/2023    Ankylosing spondylitis (Multi)     Arrhythmia     PAF    Arthritis     Cholelithiasis without obstruction 02/25/2020    Coronary artery disease     Cough 10/04/2023    COVID-19 10/04/2023    Erythema intertrigo 05/09/2023    High prostate specific antigen (PSA) 08/21/2019    Hypertension     Inflamed seborrheic keratosis 05/09/2023    Melanocytic nevi of unspecified upper limb, including shoulder 05/09/2023    Sore throat 10/04/2023    Stroke (Multi)     approx 3 yrs ago       Current Facility-Administered Medications:     lactated Ringer's infusion, 100 mL/hr, intravenous, Continuous, Jeramy Barakat MD, Last Rate: 100 mL/hr at 06/12/24 1307, 100 mL/hr at 06/12/24 1307  Prior to Admission medications    Medication Sig Start Date End Date Taking? Authorizing Provider   aspirin 81 mg EC tablet Take 1 tablet (81 mg) by mouth 2 times a day.   Yes Historical Provider, MD   loratadine (Claritin) 10 mg tablet Take 1 tablet (10 mg) by mouth 2 times a day.   Yes Historical Provider, MD   metoprolol succinate XL (Toprol-XL) 100 mg 24 hr tablet Take 1 tablet (100 mg) by mouth. Take 1 tab in the morning, half tablet in the evening. 6/29/23  Yes Historical Provider, MD   pantoprazole (ProtoNix) 40 mg EC tablet Take 1 tablet (40 mg) by mouth 2 times a day before meals for 28 doses. Do  not crush, chew, or split.  Patient not taking: Reported on 2024  Cory Colon MD   sulfacetamide sodium-sulfur 9-4 % cleanser Apply 1 Application topically once daily.  Patient not taking: Reported on 2024   NUNO Horner-CNP     Allergies   Allergen Reactions    Penicillins Hives, Rash, Shortness of breath and Unknown    Other Other     flushing, near-syncope    Statins-Hmg-Coa Reductase Inhibitors Unknown     muscle weakness on Lipitor; crestor ok     Social History     Tobacco Use    Smoking status: Former     Current packs/day: 0.00     Types: Cigarettes     Quit date:      Years since quittin.4    Smokeless tobacco: Never   Substance Use Topics    Alcohol use: Yes     Alcohol/week: 7.0 standard drinks of alcohol     Types: 7 Cans of beer per week     Comment: 1-2 cocktails daily         Chemistry    Lab Results   Component Value Date/Time     2024 0959    K 4.2 2024 0959     2024 0959    CO2 32 2024 0959    BUN 12 2024 0959    CREATININE 0.80 2024 0959    Lab Results   Component Value Date/Time    CALCIUM 8.4 (L) 2024 0959    ALKPHOS 38 2024 0959    AST 16 2024 0959    ALT 16 2024 0959    BILITOT 0.7 2024 0959          Lab Results   Component Value Date/Time    WBC 5.8 2024 0959    HGB 16.1 2024 0959    HCT 48.0 2024 0959     (L) 2024 0959     Lab Results   Component Value Date/Time    PROTIME 14.0 (H) 2024 0625    INR 1.2 (H) 2024 0625       No results found for this or any previous visit from the past 1095 days.    Relevant Problems   Cardiac   (+) Coronary artery disease involving coronary bypass graft of native heart without angina pectoris   (+) Essential (primary) hypertension   (+) Hypercholesterolemia   (+) Paroxysmal atrial fibrillation (Multi)      Neuro   (+) Anxiety   (+) Cerebrovascular accident (CVA) due to embolism of  right middle cerebral artery (Multi)   (+) Chronic anxiety      /Renal   (+) Benign non-nodular prostatic hyperplasia without lower urinary tract symptoms   (+) Right nephrolithiasis      Liver   (+) Calculus of gallbladder without cholecystitis without obstruction   (+) Steatosis of liver      Endocrine   (+) Type 2 diabetes mellitus with hyperglycemia (Multi)      Hematology   (+) Primary thrombocytopenia (Multi)      Skin   (+) Basal cell carcinoma of skin of nose   (+) Malignant neoplasm of skin       Clinical information reviewed:   Tobacco  Allergies  Meds   Med Hx  Surg Hx   Fam Hx  Soc Hx        NPO Detail:  NPO/Void Status  Carbohydrate Drink Given Prior to Surgery? : N  Date of Last Liquid: 06/11/24  Time of Last Liquid: 2300  Date of Last Solid: 06/11/24  Time of Last Solid: 2300  Last Intake Type: Clear fluids  Time of Last Void: 1200         Physical Exam    Airway  Mallampati: II     Cardiovascular - normal exam     Dental    Pulmonary    Abdominal            Anesthesia Plan    History of general anesthesia?: yes  History of complications of general anesthesia?: no    ASA 3     general     The patient is not a current smoker.  Patient was not previously instructed to abstain from smoking on day of procedure.  Patient did not smoke on day of procedure.  Education provided regarding risk of obstructive sleep apnea.  intravenous induction   Anesthetic plan and risks discussed with patient.    Plan discussed with CRNA.

## 2024-06-12 NOTE — OP NOTE
LAPAROSCOPIC RIGHT INGUINAL HERNIA REPAIR WITH MESH (R)     Operative Date: 06/12/24  Patient's Name: Lv Quinteros  Patient's YOB: 1940  Patient's MRN: [id number]  Patient's Age: 83 y.o.  Operating Room Location: [facility]  Patient's ASA: III  Patient's Estimated Blood Loss: 5 mL        PREOPERATIVE DIAGNOSIS:   Right inguinal hernia.         POSTOPERATIVE DIAGNOSIS:   Right inguinal hernia.         OPERATION/PROCEDURE:   Laparoscopic right inguinal hernia repair.         SURGEON:   Adair Baltazar MD.         ASSISTANT:   Scrub assist.           INDICATIONS:   This is a 83 y.o. male who presented with a right inguinal hernia.  We discussed that I recommend a laparoscopic right inguinal hernia repair.        OPERATION:   The reasons for, benefits to, and risks of surgery were discussed with the patient.  The risks included, but not limited to, bleeding, infection, recurrence, and mesh infection.  The patient appeared to understand and consented for surgery.  He was therefore brought back to the operating room and placed on the operating room table in the supine position.  His abdomen was prepped and draped in a standard fashion.       We started with a 12 mm incision just inferior into the left of the umbilicus.  After dissecting down to the anterior sheath, we opened it transversely 12 mm.  We then bluntly retracted the left rectus muscle to the left.  We then used our balloon dissector into the space of Retzius and insufflated under direct visualization.  We then deflated our dissecting balloon and replaced it with our 12 mm balloon-tipped Paige port.  We then insufflated the space of Retzius to pressure.  We then placed our two 5 mm working ports in the standard location between the pubic bone and the umbilicus.  We then began dissecting out the right myopectineal orifice.  We quickly came across the right inguinal hernia sac.  We were able to fully reduce  this.  We then completely dissected out the right myopectineal orifice.  We then used our mesh.  We used a Bard 3DMax Light right-sided large piece of mesh.  It fit easily over the right myopectineal orifice.  We then tacked into place using an AbsorbaTack.  We placed 1 tack in pubic bone, 1 tack in Donte's ligament, 1 tack laterally by the ASIS, 1 tack anteriorly lateral to  the epigastric vessels, and 1 tack anteriorly medial to the  epigastric vessels.  We then instilled dilute Marcaine for pain  control.  We slowly desufflated the space ensuring that the mesh  stayed flat against the myopectineal orifice.  We then removed our  ports and closed the fascia of our 12 mm port site with interrupted  figure-of-eight 0 Vicryl sutures.  We closed the skin of all of our port sites with subcuticular 4-0 Vicryl suture.  Dermabond was applied over top.         DISPOSITION:   The patient tolerated the procedure well.  There were no immediate complications.  He will be brought to the postanesthesia care unit. From there, he will be discharged home with outpatient followup with me.      I was present and scrubbed in for the entire procedure.      CPT Code:  66315       Operating Room Staff:  Anesthesiologist: Jeramy Barakat MD  CRNA: NUNO Tyler-CRNA  Circulator: Rosio Silva RN  Scrub Person: Francisco Baltazar MD  General Surgery  Office: [phone]  Fax:     [phone]  4:49 PM  06/12/24

## 2024-06-12 NOTE — ANESTHESIA POSTPROCEDURE EVALUATION
Patient: Lv Quinteros    Procedure Summary       Date: 06/12/24 Room / Location: [facility] OR 06 / [facility]    Anesthesia Start: 1541 Anesthesia Stop: 1701    Procedure: LAPAROSCOPIC RIGHT INGUINAL HERNIA REPAIR WITH MESH (Right) Diagnosis:       Non-recurrent unilateral inguinal hernia without obstruction or gangrene      (Non-recurrent unilateral inguinal hernia without obstruction or gangrene [K40.90])    Surgeons: Adair Baltazar MD Responsible Provider: Jeramy Barakat MD    Anesthesia Type: general ASA Status: 3            Anesthesia Type: general    Vitals Value Taken Time   BP See vital 06/12/24 1701   Temp  06/12/24 1701   Pulse  06/12/24 1701   Resp  06/12/24 1701   SpO2  06/12/24 1701       Anesthesia Post Evaluation    Patient location during evaluation: PACU  Patient participation: complete - patient participated  Level of consciousness: awake  Pain management: adequate  Multimodal analgesia pain management approach  Airway patency: patent  Two or more strategies used to mitigate risk of obstructive sleep apnea  Cardiovascular status: acceptable  Respiratory status: acceptable  Hydration status: acceptable  Postoperative Nausea and Vomiting: none        No notable events documented.

## 2024-06-12 NOTE — DISCHARGE INSTRUCTIONS
PATIENT INSTRUCTIONS  Laparoscopic / Robotic Inguinal Hernia Repair    FOLLOW-UP: Please call Dr. Baltazar's office at [phone] after being discharged to make a follow up appointment in 2-3 weeks. Call your physician immediately if you have any fevers greater than 103 degrees Fahrenheit, drainage from your wound that is not clear or looks infected, persistent bleeding, increasing abdominal pain,  or persistent nausea/vomiting.     WOUND CARE INSTRUCTIONS: Incisions are closed with absorbable sutures and covered with glue. Glue will fall off in about 2 weeks. If the wound becomes bright red and painful or starts to drain infected material that is not clear, please contact your physician immediately. If the wound is mildly pink and has a thick firm ridge underneath it, this is normal and is referred to as a healing ridge. This will resolve over the next 4-6 weeks. You are welcome to shower the day after surgery. Wash abdomen with warm, soapy water using your hand or gentle wash cloth. Pat dry. Do not submerge incisions in a bath tub or swimming pool for 2 weeks following surgery.    DIET: You may eat any foods that you can tolerate. We recommend following a bland, easily digestible diet for the first few days after surgery until your appetite improves. It is a good idea to eat a high fiber diet, and take in plenty of fluids to prevent constipation. Take Miralax daily if experiencing constipation after surgery until stools are a pudding like consistency and easy to pass.     ACTIVITY: You are encouraged to cough and take deep breaths or use the incentive spirometry that was provided. This will help prevent respiratory complications and low grade fevers post-operatively. You may want to hug a pillow when coughing and sneezing to add additional support to the surgical area which will decrease pain during these times. You should not lift more than 10 pounds for 1 week after surgery as it could put you at increased risk  for a post-operative hernia recurrence. We encourage frequent ambulation and getting out of bed as much as possible while you recover to improve your recovery process. Avoid strenuous activity for 4 weeks, which includes exercise that increases the heart rate, breathing rate, or makes you break a sweat.   At one week, please ease back into normal activity.    MEDICATIONS: Plan to take Tylenol and Ibuprofen (if your physician approves) every 6 hours scheduled for the first few days after surgery. After a few days, change taking these medications to every 6 hours as needed. You will be provided a short course of a narcotic medication to take for breakthrough pain as needed. You should not drive, make important decisions, or operate machinery when taking narcotic pain medication.    QUESTIONS: Please feel free to call Dr. Baltazar's office for any questions or concerns following surgery. Our office number is [phone].

## 2024-06-28 ENCOUNTER — APPOINTMENT (OUTPATIENT)
Dept: SURGERY | Facility: CLINIC | Age: 84
End: 2024-06-28
Payer: MEDICARE

## 2024-06-28 VITALS — SYSTOLIC BLOOD PRESSURE: 139 MMHG | HEART RATE: 56 BPM | DIASTOLIC BLOOD PRESSURE: 76 MMHG

## 2024-06-28 DIAGNOSIS — K40.90 RIGHT INGUINAL HERNIA: Primary | ICD-10-CM

## 2024-06-28 PROCEDURE — 99024 POSTOP FOLLOW-UP VISIT: CPT | Performed by: SURGERY

## 2024-06-28 PROCEDURE — 3075F SYST BP GE 130 - 139MM HG: CPT | Performed by: SURGERY

## 2024-06-28 PROCEDURE — 3078F DIAST BP <80 MM HG: CPT | Performed by: SURGERY

## 2024-06-28 PROCEDURE — 1157F ADVNC CARE PLAN IN RCRD: CPT | Performed by: SURGERY

## 2024-06-28 NOTE — PROGRESS NOTES
Laparoscopic Inguinal Hernia Post Op Visit      Referring Provider:   Chad Buchanan DO  No ref. provider found       Chief Complaint:  Follow up from laparoscopic inguinal hernia repair      History of Present Illness:  This is a 83 y.o.-year-old male who presents for follow up of laparoscopic right inguinal hernia repair.  He was last seen about 2 weeks ago.  He has been doing well since surgery.  He has no specific complaints.       Vitals:   Vitals:    06/28/24 1142   BP: 139/76   Pulse: 56         Physical Exam:   General: No acute distress. Sitting up in bed.   Neuro: Alert and oriented ×3. Follows commands.  Head: Atraumatic  Eyes: Pupils equal reactive to light. Extraocular motions intact.  Ears: Hears normal speaking voice.  Mouth, Nose, Throat: Mucous membranes moist.  Normal dentition.  Neck: Supple. No appreciable masses.  Chest: No crepitus.    Heart: Regular rate and rhythm.  Lung: Clear to auscultation bilaterally.  Vascular: No carotid bruits.  Palpable radial pulses bilaterally.  Abdomen: Soft. Nondistended. Nontender.  Well-healed laparoscopic incisions.  No obvious recurrent hernia.  Musculoskeletal: Moves all extremities.  Normal range of motion.  Lymphatic: No palpable lymph nodes.  Skin: No rashes or lesions.  Psychological: Normal affect      Labs:  None      Imaging:   None      Assessment:  This is a 83 y.o.-year-old male who presents for follow up of a laparoscopic right inguinal hernia repair.  He has been doing very well since surgery.  He has no complaints.  There is no evidence of any obvious complications.  There is no evidence of any recurrent hernia at this time.      Plan:  -- OK to swim.  -- No lifting lifting restrictions  -- At this point, there is no specific need to follow up with me.  If he has any new questions or concerns, he may return at any time.      Salinas Baltazar MD  General Surgery  Office: [phone]  Fax: [phone]  12:03 PM  06/28/24          Past  Medical History:  Past Medical History:   Diagnosis Date    Actinic keratosis 05/09/2023    Ankylosing spondylitis (Multi)     Arrhythmia     PAF    Arthritis     Cholelithiasis without obstruction 02/25/2020    Coronary artery disease     Cough 10/04/2023    COVID-19 10/04/2023    Erythema intertrigo 05/09/2023    High prostate specific antigen (PSA) 08/21/2019    Hypertension     Inflamed seborrheic keratosis 05/09/2023    Melanocytic nevi of unspecified upper limb, including shoulder 05/09/2023    Sore throat 10/04/2023    Stroke (Multi)     approx 3 yrs ago        Past Surgical History:  Past Surgical History:   Procedure Laterality Date    CATARACT EXTRACTION      COLONOSCOPY      CORONARY ARTERY BYPASS GRAFT      x1  20 yrs ago at [facility]    TONSILLECTOMY          Medications:  Current Outpatient Medications   Medication Instructions    aspirin 81 mg, oral, 2 times daily    loratadine (CLARITIN) 10 mg, oral, 2 times daily    metoprolol succinate XL (Toprol-XL) 100 mg 24 hr tablet Take 1 tablet (100 mg) by mouth. Take 1 tab in the morning, half tablet in the evening.    ondansetron ODT (Zofran-ODT) 4 mg disintegrating tablet Take 1 tablet (4 mg) by mouth every 6 hours if needed for nausea or vomiting    oxyCODONE (ROXICODONE) 5 mg, oral, Every 6 hours PRN    pantoprazole (PROTONIX) 40 mg, oral, 2 times daily before meals, Do not crush, chew, or split.    sulfacetamide sodium-sulfur 9-4 % cleanser 1 Application, Topical, Daily        Allergies:  Allergies   Allergen Reactions    Penicillins Hives, Rash, Shortness of breath and Unknown    Other Other     flushing, near-syncope    Statins-Hmg-Coa Reductase Inhibitors Unknown     muscle weakness on Lipitor; crestor ok        Family History:  Family History   Problem Relation Name Age of Onset    Heart disease Mother      Heart disease Father      Allergies Parent      Heart disease Parent          Social History:  Social History     Socioeconomic History    Marital  status:      Spouse name: Not on file    Number of children: Not on file    Years of education: Not on file    Highest education level: Not on file   Occupational History    Not on file   Tobacco Use    Smoking status: Former     Current packs/day: 0.00     Types: Cigarettes     Quit date:      Years since quittin.5    Smokeless tobacco: Never   Vaping Use    Vaping status: Never Used   Substance and Sexual Activity    Alcohol use: Yes     Alcohol/week: 7.0 standard drinks of alcohol     Types: 7 Cans of beer per week     Comment: 1-2 cocktails daily    Drug use: Never    Sexual activity: Not on file   Other Topics Concern    Not on file   Social History Narrative    Not on file     Social Determinants of Health     Financial Resource Strain: Low Risk  (2024)    Overall Financial Resource Strain (CARDIA)     Difficulty of Paying Living Expenses: Not hard at all   Food Insecurity: Not on file   Transportation Needs: No Transportation Needs (2024)    PRAPARE - Transportation     Lack of Transportation (Medical): No     Lack of Transportation (Non-Medical): No   Physical Activity: Not on file   Stress: Not on file   Social Connections: Not on file   Intimate Partner Violence: Not on file   Housing Stability: Low Risk  (2024)    Housing Stability Vital Sign     Unable to Pay for Housing in the Last Year: No     Number of Places Lived in the Last Year: 1     Unstable Housing in the Last Year: No        Review of Systems:  A complete 12 point review of systems was performed and is negative except as noted in the history of present illness.

## 2024-07-08 ENCOUNTER — APPOINTMENT (OUTPATIENT)
Dept: DERMATOLOGY | Facility: CLINIC | Age: 84
End: 2024-07-08
Payer: MEDICARE

## 2024-07-11 ENCOUNTER — PATIENT OUTREACH (OUTPATIENT)
Dept: PRIMARY CARE | Facility: CLINIC | Age: 84
End: 2024-07-11
Payer: MEDICARE

## 2024-07-11 NOTE — PROGRESS NOTES
90 day outreach complete. Pt questions and concerns addressed. Pt states they are doing well. Pt has graduated from [facility] program.

## 2024-10-09 ENCOUNTER — APPOINTMENT (OUTPATIENT)
Dept: ORTHOPEDIC SURGERY | Facility: CLINIC | Age: 84
End: 2024-10-09
Payer: MEDICARE

## 2024-10-09 ENCOUNTER — HOSPITAL ENCOUNTER (OUTPATIENT)
Dept: RADIOLOGY | Facility: HOSPITAL | Age: 84
Discharge: HOME | End: 2024-10-09
Payer: MEDICARE

## 2024-10-09 VITALS — WEIGHT: 152 LBS | BODY MASS INDEX: 21.76 KG/M2 | HEIGHT: 70 IN

## 2024-10-09 DIAGNOSIS — S83.242A TEAR OF MEDIAL MENISCUS OF LEFT KNEE, CURRENT, UNSPECIFIED TEAR TYPE, INITIAL ENCOUNTER: ICD-10-CM

## 2024-10-09 DIAGNOSIS — M25.562 LEFT KNEE PAIN, UNSPECIFIED CHRONICITY: ICD-10-CM

## 2024-10-09 DIAGNOSIS — M17.12 ARTHRITIS OF LEFT KNEE: Primary | ICD-10-CM

## 2024-10-09 PROCEDURE — 1157F ADVNC CARE PLAN IN RCRD: CPT | Performed by: ORTHOPAEDIC SURGERY

## 2024-10-09 PROCEDURE — 1125F AMNT PAIN NOTED PAIN PRSNT: CPT | Performed by: ORTHOPAEDIC SURGERY

## 2024-10-09 PROCEDURE — 1160F RVW MEDS BY RX/DR IN RCRD: CPT | Performed by: ORTHOPAEDIC SURGERY

## 2024-10-09 PROCEDURE — 73564 X-RAY EXAM KNEE 4 OR MORE: CPT | Mod: LT

## 2024-10-09 PROCEDURE — 99214 OFFICE O/P EST MOD 30 MIN: CPT | Performed by: ORTHOPAEDIC SURGERY

## 2024-10-09 PROCEDURE — 73564 X-RAY EXAM KNEE 4 OR MORE: CPT | Mod: LEFT SIDE | Performed by: RADIOLOGY

## 2024-10-09 PROCEDURE — 1159F MED LIST DOCD IN RCRD: CPT | Performed by: ORTHOPAEDIC SURGERY

## 2024-10-09 RX ORDER — NAPROXEN 500 MG/1
500 TABLET ORAL 2 TIMES DAILY PRN
Qty: 60 TABLET | Refills: 0 | Status: SHIPPED | OUTPATIENT
Start: 2024-10-09 | End: 2024-11-08

## 2024-10-09 ASSESSMENT — PAIN - FUNCTIONAL ASSESSMENT: PAIN_FUNCTIONAL_ASSESSMENT: 0-10

## 2024-10-09 ASSESSMENT — PAIN SCALES - GENERAL: PAINLEVEL_OUTOF10: 8

## 2024-10-09 NOTE — PROGRESS NOTES
History of Present Illness:    84 y.o. male patient presents clinic today for his left knee.  He has had chronic left knee pain but has gotten worse over the last month or so.  He was last seen in October 2023.  Today he states that he is having anterior medial knee pain.  He states that it is worse with walking.  He also states that he has difficulty changing from a sitting to standing position.  He notes that he has quite a bit of pain when he first gets up from a chair.  He states it feels stiff.  He feels like there is a grinding sensation at times.  He has been taking Tylenol as needed for pain.  Patient is using a walker at home and when he is out.  He denies any numbness or tingling or radicular symptoms.  He has not had any surgeries on the left knee previously.      Review of Systems:    GENERAL: Negative  GI: Negative  MUSCULOSKELETAL: See HPI  SKIN: Negative  NEURO:  Negative        Physical Exam:  Patients' self reported past medical history, medications, allergies, surgical history, family and social history as well as a 10 point review of systems has been documented in the new patient intake form and scanned into the patient's electronic medical record.  The intake form was reviewed by Dr Willett during the office visit and signed by Dr. Willett and the patient.  Pertinent findings are documented in the HPI.    General Multi-System Physical Exam:  Constitutional  General appearance:  Alert, oriented, and in no acute distress.  Well developed, well nourished.  Head and Face  Head and face:  Normocephalic and atraumatic.  Ears, Nose, Mouth, and Throat  External inspection of ears and nose: Normal.  Eyes:  Pupils are equal and round.  Neck  Neck:  no neck mass was observed.  Pulmonary  Respiratory effort:  no respiratory distress.  Cardiovascular  Intact distal pulses.  Lymphatic  Palpation of lymph nodes in the affected extremity:  Normal.  Skin  Skin and subcutaneous tissue:  Normal skin color and  pigmentation.  Normal skin turgor.  No rashes.  Neurologic  Sensation:  normal to light touch.  Psychiatric  Judgement and insight:  Intact.  Mood and affect:  Normal.  Musculoskeletal  Knee:  Skin is intact. No erythema or drainage noted. No evidence of infection today. no swelling noted.  Extensor mechanism intact.  Range of motion of left knee is 0-120 degrees of flection. Tenderness to palpation to over the medial joint line.  There is crepitus with passive range of motion and pain with patellar grind testing.  No tenderness palpation of the lateral joint line today.  Patient has a negative Lachman exam, negative anterior and negative posterior drawer. The knee is stable to varus and valgus stress without pain. Negative patellar apprehension. Patient is neurovascularly intact in the bilateral lower extremities.      Imaging:  X-rays of the patient were ordered by Dr Willett and obtained today.  Dr Willett personally reviewed the results of the x-rays.    In addition, Dr Willett independently interpreted the patient's x-rays (performed by the Radiology department) by viewing the x-ray images and this is Dr. Willett's personal interpretation:     Left knee  X-rays of the left knee demonstrate mild narrowing of the medial compartment with moderate patellofemoral arthritis noted.    Assessment:    Left knee pain related to arthritis and possible medial meniscus tear.    Plan:  Discussed further injury with patient.  Discussed with him that he does appear to have arthritis in the knee most significantly in the patellofemoral compartment.  In addition we discussed with him that he may have medial meniscus tear.  At this time would recommend moving forward with conservative treatment options.  We discussed the natural history of meniscal tears.  If a meniscal tear is going to heal, it usually does so within the first 4 weeks.  The patient is usually aware that the meniscus has healed due to the fact that the pain resolves.   Unfortunately, due to the poor vascular supply of the meniscus, there is only about a 20% chance that a meniscal tear will heal on its own.  We discussed the possible treatment options for meniscus tears, including nonoperative conservative therapy with anti-inflammatory medications, physical therapy, and sometimes steroid injections into the knee versus knee arthroscopy and partial meniscectomy.  We discussed the fact that the treatment option that we pursue will be entirely up to the patient.  I emphasized that no one has ever  from a meniscal tear.  However, if the pain does not resolve within the first 4 weeks, it will most likely continue to bother the patient down the road, although possibly only to a minor degree.    We talked about the arthritis in the patient's knee.  Nonoperative and operative treatments for knee arthritis include weight loss, physical therapy, anti-inflammatory medications, steroid injections, viscosupplementation injections, bracing, walking aids such as a cane, and partial or total knee replacements.  Knee arthritis is a progressive disease and while we cannot reverse the stages of arthritis, we hope to make the patient more comfortable.  I answered all of the patients questions in relation to their knee and arthritis in general.    Patient wanted to move forward with conservative treatment options today.  Patient was given a prescription for range of motion and strengthening.  In addition we discussed moving forward with a cortisone injection to the left knee.    I explained to the patient that there are some rare risks of steroid injections.       Rare risks of steroid injections into the knee include pain at the site of the injection, local swelling, irritation from the injection or the skin spray, local discoloration of the skin, risk of bleeding, and a risk of knee infection.  If the patient does have a flareup of pain in the evening following the injection, they should ice  the knee 15 minutes at a time 3 times a day for up to 3 days.  If the pain gets too severe, they should go to a local Emergency Room right away.       After understanding that there are risks and benefits of steroid injections, the patient decided to proceed with injection.  The knee was prepped sterilely with betadyne and 5 mL of 0.25% Marcaine and 1 mL of Kenalog 40 mg was injected into the knee without complications.  A bandage was applied at the site of the injection.  The patient tolerated the procedure well.        This patient has had longstanding pain and weakness in their affected extremity which has gotten worse over the last few months.  Non-operative treatment has failed to help this patient and the pain is worsening.  That would classify this problem as a chronic illness with exacerbation and progression.     Due to this patient's condition, they are at a moderate risk of morbidity from additional diagnostic testing / treatment.       To help them with their pain, I wrote them a prescription for prescription strength anti-inflammatories.  The patient was informed that there are risks of using nonsteroidal antiinflammatory (NSAID) medications.    Risks of NSAIDS include, but are not limited to, upset stomach, ulcers in the stomach and other places in the gastrointestinal tract, and a mild increase in cardiovascular risk as a result of the antiinflammatory medications.  In addition, there is an increased risk in bleeding as a result of the medications.    The patient was advised to stop taking the NSAIDs if they cause them to have an upset stomach.  NSAIDs are not supposed to be taken every day for more than a few weeks.  If they have any questions or problems with the antiinflammatory medications, they should stop taking the medication immediately and call the office.        We gave him prescription for meloxicam and a prescription for physical therapy for quad strengthening for the patellofemoral  cartilage damage    Patient ID: Lv Quinteros is a 84 y.o. male.    L Inj/Asp: L knee on 10/10/2024 8:25 AM  Indications: pain  Details: 22 G needle, anterolateral approach  Medications: 40 mg triamcinolone acetonide 40 mg/mL; 4 mL lidocaine 5 mg/mL (0.5 %)  Outcome: tolerated well, no immediate complications  Procedure, treatment alternatives, risks and benefits explained, specific risks discussed. Consent was given by the patient. Immediately prior to procedure a time out was called to verify the correct patient, procedure, equipment, support staff and site/side marked as required. Patient was prepped and draped in the usual sterile fashion.

## 2024-10-10 PROCEDURE — 20610 DRAIN/INJ JOINT/BURSA W/O US: CPT | Performed by: ORTHOPAEDIC SURGERY

## 2024-10-10 RX ORDER — TRIAMCINOLONE ACETONIDE 40 MG/ML
40 INJECTION, SUSPENSION INTRA-ARTICULAR; INTRAMUSCULAR
Status: COMPLETED | OUTPATIENT
Start: 2024-10-10 | End: 2024-10-10

## 2024-10-10 RX ORDER — LIDOCAINE HYDROCHLORIDE 5 MG/ML
4 INJECTION, SOLUTION INFILTRATION; PERINEURAL
Status: COMPLETED | OUTPATIENT
Start: 2024-10-10 | End: 2024-10-10

## 2024-11-19 ENCOUNTER — APPOINTMENT (OUTPATIENT)
Dept: RADIOLOGY | Facility: HOSPITAL | Age: 84
End: 2024-11-19
Payer: MEDICARE

## 2024-11-19 ENCOUNTER — HOSPITAL ENCOUNTER (OUTPATIENT)
Facility: HOSPITAL | Age: 84
Setting detail: OBSERVATION
Discharge: HOME | End: 2024-11-20
Attending: STUDENT IN AN ORGANIZED HEALTH CARE EDUCATION/TRAINING PROGRAM | Admitting: FAMILY MEDICINE
Payer: MEDICARE

## 2024-11-19 ENCOUNTER — APPOINTMENT (OUTPATIENT)
Dept: CARDIOLOGY | Facility: HOSPITAL | Age: 84
End: 2024-11-19
Payer: MEDICARE

## 2024-11-19 ENCOUNTER — TELEPHONE (OUTPATIENT)
Dept: PRIMARY CARE | Facility: CLINIC | Age: 84
End: 2024-11-19
Payer: MEDICARE

## 2024-11-19 DIAGNOSIS — F10.90 ALCOHOL USE DISORDER: ICD-10-CM

## 2024-11-19 DIAGNOSIS — R07.9 CHEST PAIN IN ADULT: ICD-10-CM

## 2024-11-19 DIAGNOSIS — R06.02 SHORTNESS OF BREATH: Primary | ICD-10-CM

## 2024-11-19 DIAGNOSIS — I48.91 ATRIAL FIBRILLATION, UNSPECIFIED TYPE (MULTI): ICD-10-CM

## 2024-11-19 DIAGNOSIS — R07.9 CHEST PAIN, UNSPECIFIED TYPE: ICD-10-CM

## 2024-11-19 DIAGNOSIS — I25.810 CORONARY ARTERY DISEASE INVOLVING CORONARY BYPASS GRAFT OF NATIVE HEART WITHOUT ANGINA PECTORIS: ICD-10-CM

## 2024-11-19 LAB
ALBUMIN SERPL BCP-MCNC: 4 G/DL (ref 3.4–5)
ALP SERPL-CCNC: 42 U/L (ref 33–136)
ALT SERPL W P-5'-P-CCNC: 19 U/L (ref 10–52)
ANION GAP SERPL CALC-SCNC: 11 MMOL/L (ref 10–20)
AST SERPL W P-5'-P-CCNC: 19 U/L (ref 9–39)
BASOPHILS # BLD AUTO: 0.05 X10*3/UL (ref 0–0.1)
BASOPHILS NFR BLD AUTO: 0.5 %
BILIRUB SERPL-MCNC: 0.8 MG/DL (ref 0–1.2)
BNP SERPL-MCNC: 131 PG/ML (ref 0–99)
BUN SERPL-MCNC: 13 MG/DL (ref 6–23)
CALCIUM SERPL-MCNC: 9.1 MG/DL (ref 8.6–10.3)
CARDIAC TROPONIN I PNL SERPL HS: 19 NG/L (ref 0–20)
CARDIAC TROPONIN I PNL SERPL HS: 21 NG/L (ref 0–20)
CARDIAC TROPONIN I PNL SERPL HS: 26 NG/L (ref 0–20)
CHLORIDE SERPL-SCNC: 104 MMOL/L (ref 98–107)
CHOLEST SERPL-MCNC: 186 MG/DL (ref 0–199)
CHOLESTEROL/HDL RATIO: 4.1
CO2 SERPL-SCNC: 30 MMOL/L (ref 21–32)
CREAT SERPL-MCNC: 0.85 MG/DL (ref 0.5–1.3)
EGFRCR SERPLBLD CKD-EPI 2021: 86 ML/MIN/1.73M*2
EOSINOPHIL # BLD AUTO: 0.19 X10*3/UL (ref 0–0.4)
EOSINOPHIL NFR BLD AUTO: 2 %
ERYTHROCYTE [DISTWIDTH] IN BLOOD BY AUTOMATED COUNT: 13.5 % (ref 11.5–14.5)
FLUAV RNA RESP QL NAA+PROBE: NOT DETECTED
FLUBV RNA RESP QL NAA+PROBE: NOT DETECTED
GLUCOSE SERPL-MCNC: 100 MG/DL (ref 74–99)
HCT VFR BLD AUTO: 52.9 % (ref 41–52)
HDLC SERPL-MCNC: 45 MG/DL
HGB BLD-MCNC: 18.3 G/DL (ref 13.5–17.5)
IMM GRANULOCYTES # BLD AUTO: 0.04 X10*3/UL (ref 0–0.5)
IMM GRANULOCYTES NFR BLD AUTO: 0.4 % (ref 0–0.9)
LDLC SERPL CALC-MCNC: 102 MG/DL
LIPASE SERPL-CCNC: 53 U/L (ref 9–82)
LYMPHOCYTES # BLD AUTO: 1.45 X10*3/UL (ref 0.8–3)
LYMPHOCYTES NFR BLD AUTO: 15 %
MAGNESIUM SERPL-MCNC: 1.8 MG/DL (ref 1.6–2.4)
MCH RBC QN AUTO: 31.3 PG (ref 26–34)
MCHC RBC AUTO-ENTMCNC: 34.6 G/DL (ref 32–36)
MCV RBC AUTO: 90 FL (ref 80–100)
MONOCYTES # BLD AUTO: 0.76 X10*3/UL (ref 0.05–0.8)
MONOCYTES NFR BLD AUTO: 7.8 %
NEUTROPHILS # BLD AUTO: 7.2 X10*3/UL (ref 1.6–5.5)
NEUTROPHILS NFR BLD AUTO: 74.3 %
NON HDL CHOLESTEROL: 141 MG/DL (ref 0–149)
NRBC BLD-RTO: 0 /100 WBCS (ref 0–0)
PLATELET # BLD AUTO: 146 X10*3/UL (ref 150–450)
POTASSIUM SERPL-SCNC: 3.7 MMOL/L (ref 3.5–5.3)
PROT SERPL-MCNC: 6.2 G/DL (ref 6.4–8.2)
Q ONSET: 212 MS
Q ONSET: 212 MS
QRS COUNT: 13 BEATS
QRS COUNT: 15 BEATS
QRS DURATION: 136 MS
QRS DURATION: 138 MS
QT INTERVAL: 368 MS
QT INTERVAL: 404 MS
QTC CALCULATION(BAZETT): 457 MS
QTC CALCULATION(BAZETT): 469 MS
QTC FREDERICIA: 426 MS
QTC FREDERICIA: 446 MS
R AXIS: -55 DEGREES
R AXIS: -59 DEGREES
RBC # BLD AUTO: 5.85 X10*6/UL (ref 4.5–5.9)
SARS-COV-2 RNA RESP QL NAA+PROBE: NOT DETECTED
SODIUM SERPL-SCNC: 141 MMOL/L (ref 136–145)
T AXIS: -1 DEGREES
T AXIS: -17 DEGREES
T OFFSET: 396 MS
T OFFSET: 414 MS
TRIGL SERPL-MCNC: 193 MG/DL (ref 0–149)
TSH SERPL-ACNC: 1.35 MIU/L (ref 0.44–3.98)
TSH SERPL-ACNC: 1.47 MIU/L (ref 0.44–3.98)
VENTRICULAR RATE: 81 BPM
VENTRICULAR RATE: 93 BPM
VLDL: 39 MG/DL (ref 0–40)
WBC # BLD AUTO: 9.7 X10*3/UL (ref 4.4–11.3)

## 2024-11-19 PROCEDURE — 84443 ASSAY THYROID STIM HORMONE: CPT

## 2024-11-19 PROCEDURE — 2500000004 HC RX 250 GENERAL PHARMACY W/ HCPCS (ALT 636 FOR OP/ED): Performed by: NURSE PRACTITIONER

## 2024-11-19 PROCEDURE — 36415 COLL VENOUS BLD VENIPUNCTURE: CPT

## 2024-11-19 PROCEDURE — 99285 EMERGENCY DEPT VISIT HI MDM: CPT

## 2024-11-19 PROCEDURE — G0378 HOSPITAL OBSERVATION PER HR: HCPCS

## 2024-11-19 PROCEDURE — 93010 ELECTROCARDIOGRAM REPORT: CPT | Performed by: STUDENT IN AN ORGANIZED HEALTH CARE EDUCATION/TRAINING PROGRAM

## 2024-11-19 PROCEDURE — 83880 ASSAY OF NATRIURETIC PEPTIDE: CPT

## 2024-11-19 PROCEDURE — 83735 ASSAY OF MAGNESIUM: CPT

## 2024-11-19 PROCEDURE — 85025 COMPLETE CBC W/AUTO DIFF WBC: CPT

## 2024-11-19 PROCEDURE — 94760 N-INVAS EAR/PLS OXIMETRY 1: CPT

## 2024-11-19 PROCEDURE — 87636 SARSCOV2 & INF A&B AMP PRB: CPT

## 2024-11-19 PROCEDURE — 93005 ELECTROCARDIOGRAM TRACING: CPT

## 2024-11-19 PROCEDURE — 80061 LIPID PANEL: CPT | Performed by: NURSE PRACTITIONER

## 2024-11-19 PROCEDURE — 84484 ASSAY OF TROPONIN QUANT: CPT

## 2024-11-19 PROCEDURE — 71045 X-RAY EXAM CHEST 1 VIEW: CPT | Performed by: RADIOLOGY

## 2024-11-19 PROCEDURE — 83690 ASSAY OF LIPASE: CPT

## 2024-11-19 PROCEDURE — 99223 1ST HOSP IP/OBS HIGH 75: CPT | Performed by: NURSE PRACTITIONER

## 2024-11-19 PROCEDURE — 84443 ASSAY THYROID STIM HORMONE: CPT | Performed by: NURSE PRACTITIONER

## 2024-11-19 PROCEDURE — 71045 X-RAY EXAM CHEST 1 VIEW: CPT

## 2024-11-19 PROCEDURE — 80053 COMPREHEN METABOLIC PANEL: CPT

## 2024-11-19 PROCEDURE — 2500000001 HC RX 250 WO HCPCS SELF ADMINISTERED DRUGS (ALT 637 FOR MEDICARE OP): Performed by: NURSE PRACTITIONER

## 2024-11-19 PROCEDURE — 84484 ASSAY OF TROPONIN QUANT: CPT | Performed by: NURSE PRACTITIONER

## 2024-11-19 RX ORDER — CETIRIZINE HYDROCHLORIDE 10 MG/1
10 TABLET ORAL DAILY
Status: DISCONTINUED | OUTPATIENT
Start: 2024-11-19 | End: 2024-11-20 | Stop reason: HOSPADM

## 2024-11-19 RX ORDER — LANOLIN ALCOHOL/MO/W.PET/CERES
100 CREAM (GRAM) TOPICAL DAILY
Status: DISCONTINUED | OUTPATIENT
Start: 2024-11-19 | End: 2024-11-20 | Stop reason: HOSPADM

## 2024-11-19 RX ORDER — ACETAMINOPHEN 325 MG/1
650 TABLET ORAL EVERY 4 HOURS PRN
Status: DISCONTINUED | OUTPATIENT
Start: 2024-11-19 | End: 2024-11-20 | Stop reason: HOSPADM

## 2024-11-19 RX ORDER — LORAZEPAM 0.5 MG/1
0.5 TABLET ORAL NIGHTLY
Status: DISCONTINUED | OUTPATIENT
Start: 2024-11-19 | End: 2024-11-20 | Stop reason: HOSPADM

## 2024-11-19 RX ORDER — MORPHINE SULFATE 2 MG/ML
1 INJECTION, SOLUTION INTRAMUSCULAR; INTRAVENOUS EVERY 4 HOURS PRN
Status: DISCONTINUED | OUTPATIENT
Start: 2024-11-19 | End: 2024-11-20 | Stop reason: HOSPADM

## 2024-11-19 RX ORDER — SODIUM CHLORIDE 9 MG/ML
75 INJECTION, SOLUTION INTRAVENOUS CONTINUOUS
Status: DISCONTINUED | OUTPATIENT
Start: 2024-11-19 | End: 2024-11-20

## 2024-11-19 RX ORDER — B-COMPLEX WITH VITAMIN C
1 TABLET ORAL DAILY
Status: DISCONTINUED | OUTPATIENT
Start: 2024-11-19 | End: 2024-11-20 | Stop reason: HOSPADM

## 2024-11-19 RX ORDER — NAPROXEN SODIUM 220 MG/1
81 TABLET, FILM COATED ORAL DAILY
Status: DISCONTINUED | OUTPATIENT
Start: 2024-11-19 | End: 2024-11-20 | Stop reason: HOSPADM

## 2024-11-19 RX ORDER — NITROGLYCERIN 0.4 MG/1
0.4 TABLET SUBLINGUAL EVERY 5 MIN PRN
Status: DISCONTINUED | OUTPATIENT
Start: 2024-11-19 | End: 2024-11-20 | Stop reason: HOSPADM

## 2024-11-19 RX ORDER — PANTOPRAZOLE SODIUM 40 MG/1
40 TABLET, DELAYED RELEASE ORAL
Status: DISCONTINUED | OUTPATIENT
Start: 2024-11-19 | End: 2024-11-20 | Stop reason: HOSPADM

## 2024-11-19 RX ORDER — ENOXAPARIN SODIUM 100 MG/ML
40 INJECTION SUBCUTANEOUS EVERY 24 HOURS
Status: DISCONTINUED | OUTPATIENT
Start: 2024-11-19 | End: 2024-11-20

## 2024-11-19 RX ORDER — FOLIC ACID 1 MG/1
1 TABLET ORAL DAILY
Status: DISCONTINUED | OUTPATIENT
Start: 2024-11-19 | End: 2024-11-20 | Stop reason: HOSPADM

## 2024-11-19 RX ORDER — METOPROLOL SUCCINATE 50 MG/1
100 TABLET, EXTENDED RELEASE ORAL DAILY
Status: DISCONTINUED | OUTPATIENT
Start: 2024-11-19 | End: 2024-11-20 | Stop reason: HOSPADM

## 2024-11-19 SDOH — ECONOMIC STABILITY: FOOD INSECURITY: HOW HARD IS IT FOR YOU TO PAY FOR THE VERY BASICS LIKE FOOD, HOUSING, MEDICAL CARE, AND HEATING?: NOT HARD AT ALL

## 2024-11-19 SDOH — ECONOMIC STABILITY: HOUSING INSECURITY: IN THE LAST 12 MONTHS, WAS THERE A TIME WHEN YOU WERE NOT ABLE TO PAY THE MORTGAGE OR RENT ON TIME?: NO

## 2024-11-19 SDOH — SOCIAL STABILITY: SOCIAL INSECURITY: DO YOU FEEL UNSAFE GOING BACK TO THE PLACE WHERE YOU ARE LIVING?: NO

## 2024-11-19 SDOH — SOCIAL STABILITY: SOCIAL INSECURITY: WITHIN THE LAST YEAR, HAVE YOU BEEN AFRAID OF YOUR PARTNER OR EX-PARTNER?: NO

## 2024-11-19 SDOH — SOCIAL STABILITY: SOCIAL INSECURITY: ARE YOU OR HAVE YOU BEEN THREATENED OR ABUSED PHYSICALLY, EMOTIONALLY, OR SEXUALLY BY ANYONE?: NO

## 2024-11-19 SDOH — SOCIAL STABILITY: SOCIAL INSECURITY
WITHIN THE LAST YEAR, HAVE YOU BEEN RAPED OR FORCED TO HAVE ANY KIND OF SEXUAL ACTIVITY BY YOUR PARTNER OR EX-PARTNER?: NO

## 2024-11-19 SDOH — ECONOMIC STABILITY: INCOME INSECURITY: IN THE PAST 12 MONTHS HAS THE ELECTRIC, GAS, OIL, OR WATER COMPANY THREATENED TO SHUT OFF SERVICES IN YOUR HOME?: NO

## 2024-11-19 SDOH — ECONOMIC STABILITY: FOOD INSECURITY: WITHIN THE PAST 12 MONTHS, THE FOOD YOU BOUGHT JUST DIDN'T LAST AND YOU DIDN'T HAVE MONEY TO GET MORE.: NEVER TRUE

## 2024-11-19 SDOH — ECONOMIC STABILITY: HOUSING INSECURITY: IN THE PAST 12 MONTHS, HOW MANY TIMES HAVE YOU MOVED WHERE YOU WERE LIVING?: 1

## 2024-11-19 SDOH — SOCIAL STABILITY: SOCIAL INSECURITY: HAVE YOU HAD ANY THOUGHTS OF HARMING ANYONE ELSE?: NO

## 2024-11-19 SDOH — SOCIAL STABILITY: SOCIAL INSECURITY: DOES ANYONE TRY TO KEEP YOU FROM HAVING/CONTACTING OTHER FRIENDS OR DOING THINGS OUTSIDE YOUR HOME?: NO

## 2024-11-19 SDOH — ECONOMIC STABILITY: HOUSING INSECURITY: AT ANY TIME IN THE PAST 12 MONTHS, WERE YOU HOMELESS OR LIVING IN A SHELTER (INCLUDING NOW)?: NO

## 2024-11-19 SDOH — ECONOMIC STABILITY: FOOD INSECURITY: WITHIN THE PAST 12 MONTHS, YOU WORRIED THAT YOUR FOOD WOULD RUN OUT BEFORE YOU GOT THE MONEY TO BUY MORE.: NEVER TRUE

## 2024-11-19 SDOH — SOCIAL STABILITY: SOCIAL INSECURITY: ABUSE: ADULT

## 2024-11-19 SDOH — SOCIAL STABILITY: SOCIAL INSECURITY: WITHIN THE LAST YEAR, HAVE YOU BEEN HUMILIATED OR EMOTIONALLY ABUSED IN OTHER WAYS BY YOUR PARTNER OR EX-PARTNER?: NO

## 2024-11-19 SDOH — SOCIAL STABILITY: SOCIAL INSECURITY: WERE YOU ABLE TO COMPLETE ALL THE BEHAVIORAL HEALTH SCREENINGS?: YES

## 2024-11-19 SDOH — ECONOMIC STABILITY: TRANSPORTATION INSECURITY: IN THE PAST 12 MONTHS, HAS LACK OF TRANSPORTATION KEPT YOU FROM MEDICAL APPOINTMENTS OR FROM GETTING MEDICATIONS?: NO

## 2024-11-19 SDOH — SOCIAL STABILITY: SOCIAL INSECURITY
WITHIN THE LAST YEAR, HAVE YOU BEEN KICKED, HIT, SLAPPED, OR OTHERWISE PHYSICALLY HURT BY YOUR PARTNER OR EX-PARTNER?: NO

## 2024-11-19 SDOH — SOCIAL STABILITY: SOCIAL INSECURITY: ARE THERE ANY APPARENT SIGNS OF INJURIES/BEHAVIORS THAT COULD BE RELATED TO ABUSE/NEGLECT?: NO

## 2024-11-19 SDOH — SOCIAL STABILITY: SOCIAL INSECURITY: HAVE YOU HAD THOUGHTS OF HARMING ANYONE ELSE?: NO

## 2024-11-19 SDOH — SOCIAL STABILITY: SOCIAL INSECURITY: HAS ANYONE EVER THREATENED TO HURT YOUR FAMILY OR YOUR PETS?: NO

## 2024-11-19 SDOH — SOCIAL STABILITY: SOCIAL INSECURITY: DO YOU FEEL ANYONE HAS EXPLOITED OR TAKEN ADVANTAGE OF YOU FINANCIALLY OR OF YOUR PERSONAL PROPERTY?: NO

## 2024-11-19 ASSESSMENT — COGNITIVE AND FUNCTIONAL STATUS - GENERAL
MOBILITY SCORE: 24
DAILY ACTIVITIY SCORE: 24
DAILY ACTIVITIY SCORE: 24
PATIENT BASELINE BEDBOUND: NO
MOBILITY SCORE: 24

## 2024-11-19 ASSESSMENT — PAIN - FUNCTIONAL ASSESSMENT
PAIN_FUNCTIONAL_ASSESSMENT: 0-10
PAIN_FUNCTIONAL_ASSESSMENT: 0-10

## 2024-11-19 ASSESSMENT — ACTIVITIES OF DAILY LIVING (ADL)
GROOMING: INDEPENDENT
HEARING - LEFT EAR: FUNCTIONAL
DRESSING YOURSELF: INDEPENDENT
PATIENT'S MEMORY ADEQUATE TO SAFELY COMPLETE DAILY ACTIVITIES?: YES
FEEDING YOURSELF: INDEPENDENT
TOILETING: INDEPENDENT
LACK_OF_TRANSPORTATION: NO
BATHING: INDEPENDENT
ASSISTIVE_DEVICE: EYEGLASSES
HEARING - RIGHT EAR: FUNCTIONAL
JUDGMENT_ADEQUATE_SAFELY_COMPLETE_DAILY_ACTIVITIES: YES
WALKS IN HOME: INDEPENDENT
ADEQUATE_TO_COMPLETE_ADL: YES
LACK_OF_TRANSPORTATION: NO

## 2024-11-19 ASSESSMENT — HEART SCORE
AGE: 65+
HEART SCORE: 6
AGE: 65+
RISK FACTORS: >2 RISK FACTORS OR HX OF ATHEROSCLEROTIC DISEASE
ECG: NORMAL
ECG: NORMAL
HISTORY: SLIGHTLY SUSPICIOUS
RISK FACTORS: >2 RISK FACTORS OR HX OF ATHEROSCLEROTIC DISEASE
HISTORY: MODERATELY SUSPICIOUS
TROPONIN: 1-3 TIMES NORMAL LIMIT
HEART SCORE: 4
TROPONIN: LESS THAN OR EQUAL TO NORMAL LIMIT

## 2024-11-19 ASSESSMENT — ENCOUNTER SYMPTOMS
BACK PAIN: 1
SHORTNESS OF BREATH: 1
CONSTITUTIONAL NEGATIVE: 1

## 2024-11-19 ASSESSMENT — LIFESTYLE VARIABLES
SKIP TO QUESTIONS 9-10: 1
HOW OFTEN DO YOU HAVE A DRINK CONTAINING ALCOHOL: 4 OR MORE TIMES A WEEK
AUDIT-C TOTAL SCORE: 4
HOW MANY STANDARD DRINKS CONTAINING ALCOHOL DO YOU HAVE ON A TYPICAL DAY: 1 OR 2
HOW OFTEN DO YOU HAVE 6 OR MORE DRINKS ON ONE OCCASION: NEVER
AUDIT-C TOTAL SCORE: 4

## 2024-11-19 ASSESSMENT — COLUMBIA-SUICIDE SEVERITY RATING SCALE - C-SSRS
6. HAVE YOU EVER DONE ANYTHING, STARTED TO DO ANYTHING, OR PREPARED TO DO ANYTHING TO END YOUR LIFE?: NO
1. IN THE PAST MONTH, HAVE YOU WISHED YOU WERE DEAD OR WISHED YOU COULD GO TO SLEEP AND NOT WAKE UP?: NO
2. HAVE YOU ACTUALLY HAD ANY THOUGHTS OF KILLING YOURSELF?: NO

## 2024-11-19 ASSESSMENT — PAIN SCALES - GENERAL
PAINLEVEL_OUTOF10: 0 - NO PAIN
PAINLEVEL_OUTOF10: 0 - NO PAIN

## 2024-11-19 ASSESSMENT — PAIN DESCRIPTION - PAIN TYPE: TYPE: ACUTE PAIN

## 2024-11-19 NOTE — H&P
History Of Present Illness  Lv Quinteros is a 84 y.o. male with history of CVA, CAD, hypertension, atrial fibrillation without anticoagulation, and cardiac bypass, who presents after having chest pain for 2 hours.  The patient reports midsternal dull chest pain radiating into his back.  The pain is associated with shortness of breath and lightheadedness.  He denies any history of MI.  He reports taking 1 baby aspirin and Toprol prior to arrival.  EKG is negative for ST elevation.  He adds that he stopped taking Eliquis (due to rash) without speaking to cardiology a few months ago.  He quit smoking 15 years ago.  Heart score 6.  Treated with baby aspirin in the ED.  Blood work showed a H&H of 18.3/52.9, troponin 19-21, and .  The chest x-ray showed no cardiopulmonary process.  Patient reports drinking a martini at least 5 days a week.  He denies any history of withdrawal symptoms.    Past Medical History  Past Medical History:   Diagnosis Date    Actinic keratosis 05/09/2023    Ankylosing spondylitis     Arrhythmia     PAF    Arthritis     Cholelithiasis without obstruction 02/25/2020    Coronary artery disease     Cough 10/04/2023    COVID-19 10/04/2023    Erythema intertrigo 05/09/2023    High prostate specific antigen (PSA) 08/21/2019    Hypertension     Inflamed seborrheic keratosis 05/09/2023    Melanocytic nevi of unspecified upper limb, including shoulder 05/09/2023    Sore throat 10/04/2023    Stroke (Multi)     approx 3 yrs ago       Surgical History  Past Surgical History:   Procedure Laterality Date    CATARACT EXTRACTION      COLONOSCOPY      CORONARY ARTERY BYPASS GRAFT      [facility]    TONSILLECTOMY          Social History  He reports that he quit smoking about 30 years ago. His smoking use included cigarettes. He has never used smokeless tobacco. He reports current alcohol use of about 7.0 standard drinks of alcohol per week. He reports that he does not use drugs.    Family  "History  Family History   Problem Relation Name Age of Onset    Heart disease Mother      Heart disease Father      Allergies Parent      Heart disease Parent          Allergies  Penicillins, Other, and Statins-hmg-coa reductase inhibitors    Review of Systems   Constitutional: Negative.    HENT: Negative.     Respiratory:  Positive for shortness of breath.    Cardiovascular:  Positive for chest pain.   Musculoskeletal:  Positive for back pain.        Physical Exam  HENT:      Mouth/Throat:      Mouth: Mucous membranes are dry.   Eyes:      Extraocular Movements: Extraocular movements intact.   Cardiovascular:      Rate and Rhythm: Rhythm irregular.      Heart sounds: Normal heart sounds.   Abdominal:      Palpations: Abdomen is soft.   Musculoskeletal:         General: No swelling.      Cervical back: Normal range of motion and neck supple.   Neurological:      Mental Status: He is alert and oriented to person, place, and time.   Psychiatric:         Mood and Affect: Mood is anxious.          Last Recorded Vitals  Blood pressure 95/73, pulse 90, temperature 36.3 °C (97.3 °F), temperature source Temporal, resp. rate (!) 23, height 1.778 m (5' 10\"), weight 71.7 kg (158 lb), SpO2 97%.    Relevant Results      Results for orders placed or performed during the hospital encounter of 11/19/24 (from the past 24 hours)   ECG 12 lead   Result Value Ref Range    Ventricular Rate 93 BPM    QRS Duration 136 ms    QT Interval 368 ms    QTC Calculation(Bazett) 457 ms    R Axis -59 degrees    T Axis -1 degrees    QRS Count 15 beats    Q Onset 212 ms    T Offset 396 ms    QTC Fredericia 426 ms   CBC and Auto Differential   Result Value Ref Range    WBC 9.7 4.4 - 11.3 x10*3/uL    nRBC 0.0 0.0 - 0.0 /100 WBCs    RBC 5.85 4.50 - 5.90 x10*6/uL    Hemoglobin 18.3 (H) 13.5 - 17.5 g/dL    Hematocrit 52.9 (H) 41.0 - 52.0 %    MCV 90 80 - 100 fL    MCH 31.3 26.0 - 34.0 pg    MCHC 34.6 32.0 - 36.0 g/dL    RDW 13.5 11.5 - 14.5 %    Platelets " 146 (L) 150 - 450 x10*3/uL    Neutrophils % 74.3 40.0 - 80.0 %    Immature Granulocytes %, Automated 0.4 0.0 - 0.9 %    Lymphocytes % 15.0 13.0 - 44.0 %    Monocytes % 7.8 2.0 - 10.0 %    Eosinophils % 2.0 0.0 - 6.0 %    Basophils % 0.5 0.0 - 2.0 %    Neutrophils Absolute 7.20 (H) 1.60 - 5.50 x10*3/uL    Immature Granulocytes Absolute, Automated 0.04 0.00 - 0.50 x10*3/uL    Lymphocytes Absolute 1.45 0.80 - 3.00 x10*3/uL    Monocytes Absolute 0.76 0.05 - 0.80 x10*3/uL    Eosinophils Absolute 0.19 0.00 - 0.40 x10*3/uL    Basophils Absolute 0.05 0.00 - 0.10 x10*3/uL   Comprehensive Metabolic Panel   Result Value Ref Range    Glucose 100 (H) 74 - 99 mg/dL    Sodium 141 136 - 145 mmol/L    Potassium 3.7 3.5 - 5.3 mmol/L    Chloride 104 98 - 107 mmol/L    Bicarbonate 30 21 - 32 mmol/L    Anion Gap 11 10 - 20 mmol/L    Urea Nitrogen 13 6 - 23 mg/dL    Creatinine 0.85 0.50 - 1.30 mg/dL    eGFR 86 >60 mL/min/1.73m*2    Calcium 9.1 8.6 - 10.3 mg/dL    Albumin 4.0 3.4 - 5.0 g/dL    Alkaline Phosphatase 42 33 - 136 U/L    Total Protein 6.2 (L) 6.4 - 8.2 g/dL    AST 19 9 - 39 U/L    Bilirubin, Total 0.8 0.0 - 1.2 mg/dL    ALT 19 10 - 52 U/L   Magnesium   Result Value Ref Range    Magnesium 1.80 1.60 - 2.40 mg/dL   Troponin I, High Sensitivity, Initial   Result Value Ref Range    Troponin I, High Sensitivity 19 0 - 20 ng/L   B-Type Natriuretic Peptide   Result Value Ref Range     (H) 0 - 99 pg/mL   Lipase   Result Value Ref Range    Lipase 53 9 - 82 U/L   TSH with reflex to Free T4 if abnormal   Result Value Ref Range    Thyroid Stimulating Hormone 1.47 0.44 - 3.98 mIU/L   ECG 12 lead   Result Value Ref Range    Ventricular Rate 81 BPM    QRS Duration 138 ms    QT Interval 404 ms    QTC Calculation(Bazett) 469 ms    R Axis -55 degrees    T Axis -17 degrees    QRS Count 13 beats    Q Onset 212 ms    T Offset 414 ms    QTC Fredericia 446 ms   Influenza A, and B PCR   Result Value Ref Range    Flu A Result Not Detected Not  Detected    Flu B Result Not Detected Not Detected   Sars-CoV-2 PCR   Result Value Ref Range    Coronavirus 2019, PCR Not Detected Not Detected   Troponin, High Sensitivity, 1 Hour   Result Value Ref Range    Troponin I, High Sensitivity 21 (H) 0 - 20 ng/L   Thyroid Stimulating Hormone   Result Value Ref Range    Thyroid Stimulating Hormone 1.35 0.44 - 3.98 mIU/L   Lipid Panel   Result Value Ref Range    Cholesterol 186 0 - 199 mg/dL    HDL-Cholesterol 45.0 mg/dL    Cholesterol/HDL Ratio 4.1     LDL Calculated 102 (H) <=99 mg/dL    VLDL 39 0 - 40 mg/dL    Triglycerides 193 (H) 0 - 149 mg/dL    Non HDL Cholesterol 141 0 - 149 mg/dL      Scheduled medications  aspirin, 81 mg, oral, Daily  cetirizine, 10 mg, oral, Daily  enoxaparin, 40 mg, subcutaneous, q24h  LORazepam, 0.5 mg, oral, Nightly  metoprolol succinate XL, 100 mg, oral, Daily  pantoprazole, 40 mg, oral, BID AC      Continuous medications  sodium chloride 0.9%, 75 mL/hr      PRN medications  PRN medications: acetaminophen, morphine, nitroglycerin, oxygen      Assessment/Plan     Lv Quinteros is a 84 y.o. male with history of CVA, CAD, hypertension, atrial fibrillation without anticoagulation, and cardiac bypass, who presents after having chest pain for 2 hours.     Acute chest pain rule out ACS  Bypass history  -EKG showed no ST elevation  -Troponin 19, 21  -Repeating the troponin level, if it increases we will start NSTEMI protocol with dual antiplatelet therapy, heparin, and ACE inhibitor.  Patient reports an allergy to statins.  -Supplemental oxygen  -Morphine and nitro SL for chest pain  -Telemetry monitoring  -N.p.o. after midnight in case of cardiac catheterization  -Will continue aspirin and metoprolol.  -Added lipid panel and TSH  -Cardiology consult    Hypertension  -Continue Toprol and reevaluate BP     Atrial fibrillation  CVA  -Stopped anticoagulation a few months ago  -Will require discussion regarding continuing anticoagulation to decrease  the chances of another stroke.    Mild dehydration  -H&H 18.3/52.9  -Will provide gentle hydration with normal saline at 75 cc an hour  -Repeat renal function panel in the a.m.    Alcohol use disorder  Anxiety  -No history of withdrawal symptoms  -Will monitor and add CIWA if needed  -Will add folic acid, MVI and thiamine  -Adding 1 dose of lorazepam for sleep    DVT prophylaxis  -Lovenox subcu      I spent 75 minutes in the professional and overall care of this patient.      NUNO Watson-CNP

## 2024-11-19 NOTE — ED PROVIDER NOTES
HPI   Chief Complaint   Patient presents with   • Chest Pain   • Shortness of Breath   • Dizziness   • Palpitations       Patient is an 84-year-old male with significant history of coronary artery disease, CVA, hypertension, atrial fibrillation, bypass surgeries presents to the ED with cc of chest pain and shortness of breath times 9 AM.  Patient states this lasted for 3 hours but has no symptoms currently.  Patient denies any history of MIs blood clots recent travel or surgery.  Patient's last surgery was hernia repair greater than 6 months ago.  Patient denies any aggravating factors.  Patient states he did take metoprolol after feeling the symptoms and believes the metoprolol helped with symptoms.  Patient denies any injury to the chest.  Patient states he was on Eliquis but stopped 3 months ago due to a rash.  Patient is only on aspirin.  Patient describes the chest pain this morning as a tight sensation.  Patient denies any history of COPD or CHF.  Patient denies any fever chills congestion cough nausea vomiting abdominal pain diarrhea constipation melena or hematochezia.  Patient follows with cardiology at [facility] however his cardiologist retired and would like to follow-up with cardiology here.  Patient denies any tobacco or street drug abuse.  Patient drinks 2 cocktails daily.              Patient History   Past Medical History:   Diagnosis Date   • Actinic keratosis 05/09/2023   • Ankylosing spondylitis    • Arrhythmia     PAF   • Arthritis    • Cholelithiasis without obstruction 02/25/2020   • Coronary artery disease    • Cough 10/04/2023   • COVID-19 10/04/2023   • Erythema intertrigo 05/09/2023   • High prostate specific antigen (PSA) 08/21/2019   • Hypertension    • Inflamed seborrheic keratosis 05/09/2023   • Melanocytic nevi of unspecified upper limb, including shoulder 05/09/2023   • Sore throat 10/04/2023   • Stroke (Multi)     approx 3 yrs ago     Past Surgical History:   Procedure  Laterality Date   • CATARACT EXTRACTION     • COLONOSCOPY     • CORONARY ARTERY BYPASS GRAFT      x1  20 yrs ago at [facility]   • TONSILLECTOMY       Family History   Problem Relation Name Age of Onset   • Heart disease Mother     • Heart disease Father     • Allergies Parent     • Heart disease Parent       Social History     Tobacco Use   • Smoking status: Former     Current packs/day: 0.00     Types: Cigarettes     Quit date:      Years since quittin.9   • Smokeless tobacco: Never   Vaping Use   • Vaping status: Never Used   Substance Use Topics   • Alcohol use: Yes     Alcohol/week: 7.0 standard drinks of alcohol     Types: 7 Cans of beer per week     Comment: 1-2 cocktails daily   • Drug use: Never       Physical Exam   ED Triage Vitals [24 1249]   Temperature Heart Rate Respirations BP   36.3 °C (97.3 °F) 92 15 (!) 130/91      Pulse Ox Temp Source Heart Rate Source Patient Position   96 % Temporal Monitor Lying      BP Location FiO2 (%)     Right arm --       Physical Exam  HENT:      Head: Normocephalic.   Cardiovascular:      Rate and Rhythm: Normal rate and regular rhythm.      Heart sounds: Normal heart sounds.   Pulmonary:      Effort: Pulmonary effort is normal.      Breath sounds: No decreased breath sounds, wheezing or rales.   Chest:      Chest wall: No tenderness.   Abdominal:      Palpations: Abdomen is soft.      Tenderness: There is no abdominal tenderness. There is no guarding or rebound.   Musculoskeletal:      Right lower leg: No edema.      Left lower leg: No edema.   Skin:     Findings: No rash.   Neurological:      General: No focal deficit present.      Mental Status: He is alert and oriented to person, place, and time.           ED Course & MDM   ED Course as of 24 1759   Tue 2024   1302 EKG interpretation performed at 1232 atrial fibrillation normal axis no acute signs of ischemia ventricular rate 93 bpm. []   1618 Emergency Medicine Attending Attestation:     My  assessment reveals well-appearing 84-year-old male with history of CAD, A-fib not on anticoagulation presenting to the emergency department with acute on chronic exertional chest pain.  Chest pain was relieved with rest, reports left-sided pressure pain associate with mild lightheadedness.  No diaphoresis, nausea or vomiting.  Here, initial troponin was slightly elevated, second troponin downtrending.  Heart score is 6 with 2 points for age, 2 points for risk factors, 1 point for story, 1 point for initial troponin.  EKG without signs of acute ischemia.  Will admit for cardiac risk stratification.  Low suspicion for acute dissection, PE given resolution of symptoms with rest, normal vital signs here. [SH]      ED Course User Index  [] Keturah Villasenor PA-C  [SH] Timothy Meeks MD         Diagnoses as of 11/19/24 1759   Shortness of breath   Chest pain, unspecified type                 No data recorded     Russ Coma Scale Score: 15 (11/19/24 1251 : Honorio Enriquez RN)                           Medical Decision Making  Medical Decision Making:  Patient presented as described in HPI. Patient case including ROS, PE, and treatment and plan discussed with ED attending if attached as cosigner. Due to patients presentation orders completed include as documented.  Presents to the ED with cc of chest pain and shortness of breath times 9 AM.  Patient describes as a tight sensation endorses exertional CP improved with rest to the attending.  Patient did take metoprolol after experiencing the symptoms and endorses improvement.  Patient denies any chest pain or shortness of breath currently.  Patient denies any palpitations.  Patient was experiencing palpitations.  Patient is nontoxic-appearing abdomen is soft and nontender lung sounds are clear no peripheral edema.  Pending labs and imaging. Troponin was 19 and repeat was 21.  Rest of labs are unremarkable.  Patient educated his findings.  Patient's heart score is 6.   Patient will need admission.  Patient is agreeable with plan.  Hospitalist accepts patient.  Patient remained stable and admitted.        Patient care discussed with: N/A  Social Determinants affecting care: N/A    Final diagnosis and disposition as below.  See CI    Hospitalize. I discussed the differential; results and admit plan with the patient and/or family/friend/caregiver if present.  I emphasized the importance of hospitalization need for re-evaluation/continued monitoring/interventions.. They agreed that if they feel their condition is worsening or if they have any other concern they should alert staff immediately for further assistance. I gave the patient an opportunity to ask all questions they had and answered all of them accordingly. The patient and/or family/friend/caregiver expressed understanding verbally and that they would comply.        Disposition:  admit    Hospitalize. Discussed findings and treatment done here in ED with admitting physician. It would be a risk to discharge the patient in their condition due to possibility of deterioration in their condition and the need for urgent interventions.    This note has been transcribed using voice recognition and may contain grammatical errors, misplaced words, incorrect words, incorrect phrases or other errors.        Labs Reviewed   CBC WITH AUTO DIFFERENTIAL - Abnormal       Result Value    WBC 9.7      nRBC 0.0      RBC 5.85      Hemoglobin 18.3 (*)     Hematocrit 52.9 (*)     MCV 90      MCH 31.3      MCHC 34.6      RDW 13.5      Platelets 146 (*)     Neutrophils % 74.3      Immature Granulocytes %, Automated 0.4      Lymphocytes % 15.0      Monocytes % 7.8      Eosinophils % 2.0      Basophils % 0.5      Neutrophils Absolute 7.20 (*)     Immature Granulocytes Absolute, Automated 0.04      Lymphocytes Absolute 1.45      Monocytes Absolute 0.76      Eosinophils Absolute 0.19      Basophils Absolute 0.05     COMPREHENSIVE METABOLIC PANEL -  Abnormal    Glucose 100 (*)     Sodium 141      Potassium 3.7      Chloride 104      Bicarbonate 30      Anion Gap 11      Urea Nitrogen 13      Creatinine 0.85      eGFR 86      Calcium 9.1      Albumin 4.0      Alkaline Phosphatase 42      Total Protein 6.2 (*)     AST 19      Bilirubin, Total 0.8      ALT 19     B-TYPE NATRIURETIC PEPTIDE - Abnormal     (*)     Narrative:        <100 pg/mL - Heart failure unlikely  100-299 pg/mL - Intermediate probability of acute heart                  failure exacerbation. Correlate with clinical                  context and patient history.    >=300 pg/mL - Heart Failure likely. Correlate with clinical                  context and patient history.    BNP testing is performed using different testing methodology at [facility] than at other [facility]. Direct result comparisons should only be made within the same method.      SERIAL TROPONIN, 1 HOUR - Abnormal    Troponin I, High Sensitivity 21 (*)     Narrative:     Less than 99th percentile of normal range cutoff-  Female and children under 18 years old <14 ng/L; Male <21 ng/L: Negative  Repeat testing should be performed if clinically indicated.     Female and children under 18 years old 14-50 ng/L; Male 21-50 ng/L:  Consistent with possible cardiac damage and possible increased clinical   risk. Serial measurements may help to assess extent of myocardial damage.     >50 ng/L: Consistent with cardiac damage, increased clinical risk and  myocardial infarction. Serial measurements may help assess extent of   myocardial damage.      NOTE: Children less than 1 year old may have higher baseline troponin   levels and results should be interpreted in conjunction with the overall   clinical context.     NOTE: Troponin I testing is performed using a different   testing methodology at [facility] than at other   [facility]. Direct result comparisons should only   be made within the same  method.   MAGNESIUM - Normal    Magnesium 1.80     SERIAL TROPONIN-INITIAL - Normal    Troponin I, High Sensitivity 19      Narrative:     Less than 99th percentile of normal range cutoff-  Female and children under 18 years old <14 ng/L; Male <21 ng/L: Negative  Repeat testing should be performed if clinically indicated.     Female and children under 18 years old 14-50 ng/L; Male 21-50 ng/L:  Consistent with possible cardiac damage and possible increased clinical   risk. Serial measurements may help to assess extent of myocardial damage.     >50 ng/L: Consistent with cardiac damage, increased clinical risk and  myocardial infarction. Serial measurements may help assess extent of   myocardial damage.      NOTE: Children less than 1 year old may have higher baseline troponin   levels and results should be interpreted in conjunction with the overall   clinical context.     NOTE: Troponin I testing is performed using a different   testing methodology at [facility] than at [facility]. Direct result comparisons should only   be made within the same method.   INFLUENZA A AND B PCR - Normal    Flu A Result Not Detected      Flu B Result Not Detected      Narrative:     This assay is an in vitro diagnostic multiplex nucleic acid amplification test for the detection and discrimination of Influenza A & B from nasopharyngeal specimens, and has been validated for use at [facility]. Negative results do not preclude Influenza A/B infections, and should not be used as the sole basis for diagnosis, treatment, or other management decisions. If Influenza A/B and RSV PCR results are negative, testing for Parainfluenza virus, Adenovirus and Metapneumovirus is routinely performed for [facility] pediatric oncology and intensive care inpatients, and is available on other patients by placing an add-on request.   SARS-COV-2 PCR - Normal    Coronavirus 2019, PCR Not Detected      Narrative:      This assay has received FDA Emergency Use Authorization (EUA) and is only authorized for the duration of time that circumstances exist to justify the authorization of the emergency use of in vitro diagnostic tests for the detection of SARS-CoV-2 virus and/or diagnosis of COVID-19 infection under section 564(b)(1) of the Act, 21 U.S.C. 360bbb-3(b)(1). This assay is an in vitro diagnostic nucleic acid amplification test for the qualitative detection of SARS-CoV-2 from nasopharyngeal specimens and has been validated for use at [facility]. Negative results do not preclude COVID-19 infections and should not be used as the sole basis for diagnosis, treatment, or other management decisions.     LIPASE - Normal    Lipase 53      Narrative:     Venipuncture immediately after or during the administration of Metamizole may lead to falsely low results. Testing should be performed immediately prior to Metamizole dosing.   TSH WITH REFLEX TO FREE T4 IF ABNORMAL - Normal    Thyroid Stimulating Hormone 1.47      Narrative:     TSH testing is performed using different testing methodology at [facility] than at [facility]. Direct result comparisons should only be made within the same method.     TROPONIN SERIES- (INITIAL, 1 HR)    Narrative:     The following orders were created for panel order Troponin I Series, High Sensitivity (0, 1 HR).  Procedure                               Abnormality         Status                     ---------                               -----------         ------                     Troponin I, High Sensiti...[[id number]]  Normal              Final result               Troponin, High Sensitivi...[[id number]]  Abnormal            Final result                 Please view results for these tests on the individual orders.      XR chest 1 view   Final Result   1.  No evidence of acute cardiopulmonary process.                  MACRO:   None        Signed by:  Harrison Rueda 11/19/2024 1:49 PM   Dictation workstation:   [id number]           Procedure  Procedures     Keturah Villasenor PA-C  11/19/24 1531       Keturah Villasenor PA-C  11/19/24 1750

## 2024-11-19 NOTE — TELEPHONE ENCOUNTER
Pt came in to office requesting us to check his BP.  Pt triaged via front office and this nurse.  Pt is having chest pain with chest tightness.  Pt instructed he needs to go to ER.  Pt expressed understanding.

## 2024-11-19 NOTE — PROGRESS NOTES
11/19/24 1600   Discharge Planning   Living Arrangements Alone  (Daughter lives close)   Support Systems Children   Assistance Needed A&OX4; independent with ADLs with no DME; drives; room air baseline and currently room air; PCP is Dr Zaid Buchanan   Type of Residence Private residence   Number of Stairs to Enter Residence 0   Number of Stairs Within Residence 0   Do you have animals or pets at home? No   Who is requesting discharge planning? Provider   Home or Post Acute Services None   Expected Discharge Disposition Home  (Pending workup but likely home no needs. Patient denies home going needs)   Does the patient need discharge transport arranged? No   Financial Resource Strain   How hard is it for you to pay for the very basics like food, housing, medical care, and heating? Not hard   Housing Stability   In the last 12 months, was there a time when you were not able to pay the mortgage or rent on time? N   In the past 12 months, how many times have you moved where you were living? 1   At any time in the past 12 months, were you homeless or living in a shelter (including now)? N   Transportation Needs   In the past 12 months, has lack of transportation kept you from medical appointments or from getting medications? no   In the past 12 months, has lack of transportation kept you from meetings, work, or from getting things needed for daily living? No     11/19/2024 1601pm  Spoke with patient bedside in ED

## 2024-11-19 NOTE — ED TRIAGE NOTES
Since this am, pt c/o slight sob, chest tightness, lightheadedness, and palpitations. Ambulatory, from home via POV. Denies chest discomfort currently. Oriented. Speaking in full sentences.

## 2024-11-19 NOTE — PROGRESS NOTES
Pharmacy Medication History Review    Lv Quinteros is a 84 y.o. male admitted for No Principal Problem: There is no principal problem currently on the Problem List. Please update the Problem List and refresh.. Pharmacy reviewed the patient's [id number] medications and allergies for accuracy.    The list below reflectives the updated PTA list. Please review each medication in order reconciliation for additional clarification and justification.  Prior to Admission Medications   Prescriptions Last Dose Informant Patient Reported? Taking?   aspirin 81 mg EC tablet 11/19/2024 Morning Self Yes Yes   Sig: Take 1 tablet (81 mg) by mouth 2 times a day.   loratadine (Claritin) 10 mg tablet 11/17/2024 Self Yes Yes   Sig: Take 1 tablet (10 mg) by mouth once daily as needed.   metoprolol succinate XL (Toprol-XL) 100 mg 24 hr tablet 11/19/2024 Morning Self Yes Yes   Sig: Take 1 tablet (100 mg) by mouth. Take 1 tab in the morning, half tablet in the evening.   ondansetron ODT (Zofran-ODT) 4 mg disintegrating tablet Not Taking Self No No   Sig: Take 1 tablet (4 mg) by mouth every 6 hours if needed for nausea or vomiting   Patient not taking: Reported on 11/19/2024   oxyCODONE (Roxicodone) 5 mg immediate release tablet Not Taking Self No No   Sig: Take 1 tablet (5 mg) by mouth every 6 hours if needed for severe pain (7 - 10) for up to 5 doses.   Patient not taking: Reported on 11/19/2024   pantoprazole (ProtoNix) 40 mg EC tablet   No No   Sig: Take 1 tablet (40 mg) by mouth 2 times a day before meals for 28 doses. Do not crush, chew, or split.   Patient not taking: Reported on 5/20/2024   sulfacetamide sodium-sulfur 9-4 % cleanser  Self No No   Sig: Apply 1 Application topically once daily.   Patient not taking: Reported on 5/20/2024      Facility-Administered Medications: None           The list below reflectives the updated allergy list. Please review each documented allergy for additional clarification and  justification.  Allergies  Reviewed by Honorio Enriquez RN on 11/19/2024        Severity Reactions Comments    Penicillins High Hives, Rash, Shortness of breath, Unknown     Other Not Specified Other flushing, near-syncope    Statins-hmg-coa Reductase Inhibitors Not Specified Unknown muscle weakness on Lipitor; crestor ok            Below are additional concerns with the patient's PTA list.      Rosamaria Cadena

## 2024-11-20 ENCOUNTER — APPOINTMENT (OUTPATIENT)
Dept: CARDIOLOGY | Facility: HOSPITAL | Age: 84
End: 2024-11-20
Payer: MEDICARE

## 2024-11-20 ENCOUNTER — APPOINTMENT (OUTPATIENT)
Dept: ALLERGY | Facility: CLINIC | Age: 84
End: 2024-11-20
Payer: MEDICARE

## 2024-11-20 ENCOUNTER — APPOINTMENT (OUTPATIENT)
Dept: RADIOLOGY | Facility: HOSPITAL | Age: 84
End: 2024-11-20
Payer: MEDICARE

## 2024-11-20 VITALS
HEART RATE: 58 BPM | HEIGHT: 70 IN | RESPIRATION RATE: 16 BRPM | OXYGEN SATURATION: 92 % | BODY MASS INDEX: 22.62 KG/M2 | SYSTOLIC BLOOD PRESSURE: 128 MMHG | TEMPERATURE: 96.8 F | DIASTOLIC BLOOD PRESSURE: 68 MMHG | WEIGHT: 158 LBS

## 2024-11-20 PROBLEM — R07.9 CHEST PAIN IN ADULT: Status: RESOLVED | Noted: 2024-11-19 | Resolved: 2024-11-20

## 2024-11-20 LAB
ANION GAP SERPL CALC-SCNC: 10 MMOL/L (ref 10–20)
AORTIC VALVE MEAN GRADIENT: 6 MMHG
AORTIC VALVE PEAK VELOCITY: 1.61 M/S
ATRIAL RATE: 127 BPM
ATRIAL RATE: 61 BPM
AV PEAK GRADIENT: 10 MMHG
AVA (PEAK VEL): 2.23 CM2
AVA (VTI): 2.05 CM2
BUN SERPL-MCNC: 12 MG/DL (ref 6–23)
CALCIUM SERPL-MCNC: 8.3 MG/DL (ref 8.6–10.3)
CARDIAC TROPONIN I PNL SERPL HS: 15 NG/L (ref 0–20)
CHLORIDE SERPL-SCNC: 107 MMOL/L (ref 98–107)
CO2 SERPL-SCNC: 29 MMOL/L (ref 21–32)
CREAT SERPL-MCNC: 0.96 MG/DL (ref 0.5–1.3)
D DIMER PPP FEU-MCNC: 642 NG/ML FEU
EGFRCR SERPLBLD CKD-EPI 2021: 78 ML/MIN/1.73M*2
EJECTION FRACTION APICAL 4 CHAMBER: 67.8
EJECTION FRACTION: 68 %
ERYTHROCYTE [DISTWIDTH] IN BLOOD BY AUTOMATED COUNT: 13.8 % (ref 11.5–14.5)
GLUCOSE BLD MANUAL STRIP-MCNC: 97 MG/DL (ref 74–99)
GLUCOSE SERPL-MCNC: 100 MG/DL (ref 74–99)
HCT VFR BLD AUTO: 46.5 % (ref 41–52)
HGB BLD-MCNC: 16.1 G/DL (ref 13.5–17.5)
LEFT ATRIUM VOLUME AREA LENGTH INDEX BSA: 37.6 ML/M2
LEFT VENTRICLE INTERNAL DIMENSION DIASTOLE: 4.76 CM (ref 3.5–6)
LEFT VENTRICULAR OUTFLOW TRACT DIAMETER: 2.02 CM
MCH RBC QN AUTO: 31.7 PG (ref 26–34)
MCHC RBC AUTO-ENTMCNC: 34.6 G/DL (ref 32–36)
MCV RBC AUTO: 92 FL (ref 80–100)
MITRAL VALVE E/A RATIO: 1.4
NRBC BLD-RTO: 0 /100 WBCS (ref 0–0)
P AXIS: 62 DEGREES
P OFFSET: 151 MS
P ONSET: 108 MS
PLATELET # BLD AUTO: 122 X10*3/UL (ref 150–450)
POTASSIUM SERPL-SCNC: 4.3 MMOL/L (ref 3.5–5.3)
PR INTERVAL: 204 MS
Q ONSET: 210 MS
Q ONSET: 210 MS
QRS COUNT: 11 BEATS
QRS COUNT: 14 BEATS
QRS DURATION: 136 MS
QRS DURATION: 144 MS
QT INTERVAL: 408 MS
QT INTERVAL: 450 MS
QTC CALCULATION(BAZETT): 453 MS
QTC CALCULATION(BAZETT): 479 MS
QTC FREDERICIA: 452 MS
QTC FREDERICIA: 454 MS
R AXIS: -54 DEGREES
R AXIS: -59 DEGREES
RBC # BLD AUTO: 5.08 X10*6/UL (ref 4.5–5.9)
RIGHT VENTRICLE FREE WALL PEAK S': 12 CM/S
SODIUM SERPL-SCNC: 142 MMOL/L (ref 136–145)
T AXIS: -22 DEGREES
T AXIS: -4 DEGREES
T OFFSET: 414 MS
T OFFSET: 435 MS
TRICUSPID ANNULAR PLANE SYSTOLIC EXCURSION: 2 CM
VENTRICULAR RATE: 61 BPM
VENTRICULAR RATE: 83 BPM
WBC # BLD AUTO: 6.8 X10*3/UL (ref 4.4–11.3)

## 2024-11-20 PROCEDURE — 82374 ASSAY BLOOD CARBON DIOXIDE: CPT | Performed by: NURSE PRACTITIONER

## 2024-11-20 PROCEDURE — 2500000001 HC RX 250 WO HCPCS SELF ADMINISTERED DRUGS (ALT 637 FOR MEDICARE OP): Performed by: NURSE PRACTITIONER

## 2024-11-20 PROCEDURE — 94760 N-INVAS EAR/PLS OXIMETRY 1: CPT

## 2024-11-20 PROCEDURE — 93306 TTE W/DOPPLER COMPLETE: CPT | Performed by: STUDENT IN AN ORGANIZED HEALTH CARE EDUCATION/TRAINING PROGRAM

## 2024-11-20 PROCEDURE — 85379 FIBRIN DEGRADATION QUANT: CPT | Performed by: NURSE PRACTITIONER

## 2024-11-20 PROCEDURE — 85027 COMPLETE CBC AUTOMATED: CPT | Performed by: NURSE PRACTITIONER

## 2024-11-20 PROCEDURE — 71275 CT ANGIOGRAPHY CHEST: CPT | Performed by: RADIOLOGY

## 2024-11-20 PROCEDURE — 2500000004 HC RX 250 GENERAL PHARMACY W/ HCPCS (ALT 636 FOR OP/ED): Performed by: NURSE PRACTITIONER

## 2024-11-20 PROCEDURE — 2550000001 HC RX 255 CONTRASTS: Performed by: FAMILY MEDICINE

## 2024-11-20 PROCEDURE — 71275 CT ANGIOGRAPHY CHEST: CPT

## 2024-11-20 PROCEDURE — 99239 HOSP IP/OBS DSCHRG MGMT >30: CPT | Performed by: NURSE PRACTITIONER

## 2024-11-20 PROCEDURE — G0378 HOSPITAL OBSERVATION PER HR: HCPCS

## 2024-11-20 PROCEDURE — 83036 HEMOGLOBIN GLYCOSYLATED A1C: CPT | Mod: GEALAB | Performed by: NURSE PRACTITIONER

## 2024-11-20 PROCEDURE — 93005 ELECTROCARDIOGRAM TRACING: CPT

## 2024-11-20 PROCEDURE — 84484 ASSAY OF TROPONIN QUANT: CPT | Performed by: NURSE PRACTITIONER

## 2024-11-20 PROCEDURE — C8929 TTE W OR WO FOL WCON,DOPPLER: HCPCS

## 2024-11-20 PROCEDURE — 82947 ASSAY GLUCOSE BLOOD QUANT: CPT

## 2024-11-20 PROCEDURE — 96374 THER/PROPH/DIAG INJ IV PUSH: CPT | Mod: 59

## 2024-11-20 PROCEDURE — 99222 1ST HOSP IP/OBS MODERATE 55: CPT | Performed by: STUDENT IN AN ORGANIZED HEALTH CARE EDUCATION/TRAINING PROGRAM

## 2024-11-20 PROCEDURE — 96361 HYDRATE IV INFUSION ADD-ON: CPT

## 2024-11-20 PROCEDURE — 36415 COLL VENOUS BLD VENIPUNCTURE: CPT | Performed by: NURSE PRACTITIONER

## 2024-11-20 RX ORDER — B-COMPLEX WITH VITAMIN C
1 TABLET ORAL DAILY
Qty: 30 TABLET | Refills: 0 | Status: SHIPPED | OUTPATIENT
Start: 2024-11-21 | End: 2024-12-21

## 2024-11-20 RX ORDER — ROSUVASTATIN CALCIUM 20 MG/1
20 TABLET, COATED ORAL NIGHTLY
Status: DISCONTINUED | OUTPATIENT
Start: 2024-11-20 | End: 2024-11-20 | Stop reason: HOSPADM

## 2024-11-20 RX ORDER — ROSUVASTATIN CALCIUM 20 MG/1
20 TABLET, COATED ORAL NIGHTLY
Qty: 30 TABLET | Refills: 0 | Status: SHIPPED | OUTPATIENT
Start: 2024-11-20 | End: 2024-12-20

## 2024-11-20 RX ORDER — DABIGATRAN ETEXILATE 150 MG/1
150 CAPSULE ORAL 2 TIMES DAILY
Status: DISCONTINUED | OUTPATIENT
Start: 2024-11-20 | End: 2024-11-20 | Stop reason: HOSPADM

## 2024-11-20 RX ORDER — DABIGATRAN ETEXILATE 150 MG/1
150 CAPSULE ORAL 2 TIMES DAILY
Qty: 60 CAPSULE | Refills: 0 | Status: SHIPPED | OUTPATIENT
Start: 2024-11-20 | End: 2024-12-20

## 2024-11-20 RX ORDER — FOLIC ACID 1 MG/1
1 TABLET ORAL DAILY
Qty: 30 TABLET | Refills: 0 | Status: SHIPPED | OUTPATIENT
Start: 2024-11-21 | End: 2024-12-21

## 2024-11-20 RX ORDER — LORAZEPAM 2 MG/ML
0.5 INJECTION INTRAMUSCULAR ONCE
Status: COMPLETED | OUTPATIENT
Start: 2024-11-20 | End: 2024-11-20

## 2024-11-20 RX ORDER — LANOLIN ALCOHOL/MO/W.PET/CERES
100 CREAM (GRAM) TOPICAL DAILY
Qty: 30 TABLET | Refills: 0 | Status: SHIPPED | OUTPATIENT
Start: 2024-11-21 | End: 2024-12-21

## 2024-11-20 ASSESSMENT — PAIN - FUNCTIONAL ASSESSMENT
PAIN_FUNCTIONAL_ASSESSMENT: 0-10
PAIN_FUNCTIONAL_ASSESSMENT: 0-10

## 2024-11-20 ASSESSMENT — ENCOUNTER SYMPTOMS
CHEST TIGHTNESS: 1
FATIGUE: 1
PALPITATIONS: 1
SHORTNESS OF BREATH: 1

## 2024-11-20 ASSESSMENT — PAIN SCALES - GENERAL
PAINLEVEL_OUTOF10: 0 - NO PAIN

## 2024-11-20 NOTE — PROGRESS NOTES
11/20/24 1055   Discharge Planning   Living Arrangements Alone  (Patient's daughter lives close by.)   Support Systems Children   Assistance Needed Alert and oriented x 3, Independent with ADL's, No DME; Drives; Room air baseline and currently room air.   Type of Residence Private residence   Number of Stairs to Enter Residence 0   Number of Stairs Within Residence 0   Do you have animals or pets at home? No   Who is requesting discharge planning? Provider   Home or Post Acute Services None   Expected Discharge Disposition Home   Does the patient need discharge transport arranged? No   Patient Choice   Provider Choice list and CMS website (https://medicare.gov/care-compare#search) for post-acute Quality and Resource Measure Data were provided and reviewed with: Patient   Patient / Family choosing to utilize agency / facility established prior to hospitalization No

## 2024-11-20 NOTE — CONSULTS
Inpatient consult to Cardiology  Consult performed by: GRETCHEN Sidhu  Consult ordered by: GRETCHEN Watson        History Of Present Illness:    Lv Quinteros is a 84 y.o. male with PMH of CVA, CAD s/p mini CABG x1 in 1997 (LIMA to LAD), HTN, HLD, PAF (not on OAC), former smoker who presents with chest tightness with radiation to back, SOB, lightheadedness, and palpitations. Symptoms lasted approximately 2 hours and occurred at rest. He denies exertional symptoms. He reports that he stopped taking Eliquis (due to rash) on his own months ago.  Blood work showed a H&H of 18.3/52.9 and . CXR showed no cardiopulmonary process.  EKG shows AF with RBBB. Troponin 19, 21, 26, 15. Home medications include aspirin, Toprol.     Review of Systems   Constitutional:  Positive for fatigue.   Eyes:  Positive for visual disturbance.   Respiratory:  Positive for chest tightness (radiating to back) and shortness of breath.    Cardiovascular:  Positive for palpitations.   All other systems reviewed and are negative.    Last Recorded Vitals:  Vitals:    11/19/24 1638 11/19/24 2023 11/20/24 0000 11/20/24 0435   BP: 134/84 145/80 127/73 154/74   BP Location: Right arm   Right arm   Patient Position: Sitting   Lying   Pulse: 88 76 65 54   Resp: 18 16 18 18   Temp: 36.2 °C (97.2 °F) 36.5 °C (97.7 °F) 36.2 °C (97.2 °F) 36.2 °C (97.2 °F)   TempSrc: Temporal   Temporal   SpO2: 95% 98% 94% 96%   Weight:       Height:           Last Labs:  CBC - 11/19/2024: 12:57 PM  9.7 18.3 146    52.9      CMP - 11/20/2024:  7:05 AM  8.3 6.2 19 --- 0.8   _ 4.0 19 42      PTT - No results in last year.  1.2   14.0 _     Troponin I, High Sensitivity   Date/Time Value Ref Range Status   11/20/2024 07:05 AM 15 0 - 20 ng/L Final   11/19/2024 04:45 PM 26 (H) 0 - 20 ng/L Final   11/19/2024 02:07 PM 21 (H) 0 - 20 ng/L Final     BNP   Date/Time Value Ref Range Status   11/19/2024 12:57  (H) 0 - 99 pg/mL Final   03/12/2021 12:38 AM  148 (H) 0 - 99 pg/mL Final     Comment:     .  <100 pg/mL - Heart failure unlikely  100-299 pg/mL - Intermediate probability of acute heart  .               failure exacerbation. Correlate with clinical  .               context and patient history.    >=300 pg/mL - Heart Failure likely. Correlate with clinical  .               context and patient history.   Biotin interference may cause falsely decreased results.   Patients taking a Biotin dose of up to 5 mg/day should   refrain from taking Biotin for 24 hours before sample   collection. Providers may contact their local laboratory   for further information.       Hemoglobin A1C   Date/Time Value Ref Range Status   10/17/2023 11:20 AM 5.1 see below % Final   05/31/2022 01:16 PM 5.0 4.3 - 5.6 % Final     Comment:     American Diabetes Association guidelines indicate that patients with HgbA1c in the range 5.7-6.4% are at increased risk for development of diabetes, and intervention by lifestyle modification may be beneficial. HgbA1c greater or equal to 6.5% is considered diagnostic of diabetes.   03/12/2021 12:38 AM 5.5 % Final     Comment:          Diagnosis of Diabetes-Adults   Non-Diabetic: < or = 5.6%   Increased risk for developing diabetes: 5.7-6.4%   Diagnostic of diabetes: > or = 6.5%  .       Monitoring of Diabetes                Age (y)     Therapeutic Goal (%)   Adults:          >18           <7.0   Pediatrics:    13-18           <7.5                   7-12           <8.0                   0- 6            7.5-8.5   American Diabetes Association. Diabetes Care 33(S1), Jan 2010.     12/11/2020 11:38 AM 5.2 % Final     Comment:          Diagnosis of Diabetes-Adults   Non-Diabetic: < or = 5.6%   Increased risk for developing diabetes: 5.7-6.4%   Diagnostic of diabetes: > or = 6.5%  .       Monitoring of Diabetes                Age (y)     Therapeutic Goal (%)   Adults:          >18           <7.0   Pediatrics:    13-18           <7.5                   7-12            <8.0                   0- 6            7.5-8.5   American Diabetes Association. Diabetes Care 33(S1), Jan 2010.       LDL Calculated   Date/Time Value Ref Range Status   11/19/2024 02:07  (H) <=99 mg/dL Final     Comment:                                 Near   Borderline      AGE      Desirable  Optimal    High     High     Very High     0-19 Y     0 - 109     ---    110-129   >/= 130     ----    20-24 Y     0 - 119     ---    120-159   >/= 160     ----      >24 Y     0 -  99   100-129  130-159   160-189     >/=190     10/17/2023 11:20  (H) <=99 mg/dL Final     Comment:                                 Near   Borderline      AGE      Desirable  Optimal    High     High     Very High     0-19 Y     0 - 109     ---    110-129   >/= 130     ----    20-24 Y     0 - 119     ---    120-159   >/= 160     ----      >24 Y     0 -  99   100-129  130-159   160-189     >/=190       VLDL   Date/Time Value Ref Range Status   11/19/2024 02:07 PM 39 0 - 40 mg/dL Final   10/17/2023 11:20 AM 23 0 - 40 mg/dL Final   03/12/2021 12:38 AM 28 0 - 40 mg/dL Final   12/11/2020 11:38 AM 31 0 - 40 mg/dL Final   12/12/2019 12:15 PM 36 0 - 40 mg/dL Final      Last I/O:  No intake/output data recorded.    Past Cardiology Tests (Last 3 Years):  EKG:  ECG 12 lead 11/19/2024  See HPI    Echo:    TRANSTHORACIC ECHOCARDIOGRAM REPORT        Patient Name:     MARY Will Physician:  [id number] Makenzie Bagley MD  Study Date:       3/12/2021          Referring           WOODRWO RAGSADLE                                       Physician:  MRN/PID:          [id number]           PCP:  Accession/Order#: [id number]          [facility]                                       Location:  YOB: 1940          Fellow:  Gender:           M                  Nurse:  Admit Date:       3/11/2021          Sonographer:        Leticia Siegel [facility],                                                            JOEYE  Admission Status: Inpatient -        Additional Staff:                    Routine  Height:           177.80 cm          CC Report to:       [facility]  Weight:           88.45 kg           Study Type:         Echocardiogram  BSA:              2.06 m2  Blood Pressure: 148 /47 mmHg     Diagnosis/ICD: I63.9-Cerebral Infarction, unspecified  Indication:    Stroke  Procedure/CPT: Echo Complete w Full Doppler-[id number]     Patient History:  Pertinent History: A-fib, ankylosing spondylitis.     Study Detail: The following Echo studies were performed: 2D, M-Mode, Doppler and                color flow. Definity used as a contrast agent for endocardial                border definition.        PHYSICIAN INTERPRETATION:  Left Ventricle: The left ventricular systolic function is normal, with an estimated ejection fraction of 65-70%. There are no regional wall motion abnormalities. The left ventricular cavity size is normal. Spectral Doppler shows an impaired relaxation pattern of left ventricular diastolic filling.  Left Atrium: The left atrium is moderately dilated. There is no evidence of a patent foramen ovale. A bubble study using agitated saline was performed.  Right Ventricle: The right ventricle is normal in size. There is normal right ventricular global systolic function.  Right Atrium: The right atrium is normal in size.  Aortic Valve: The aortic valve is trileaflet. There is mild aortic valve regurgitation. The peak instantaneous gradient of the aortic valve is 14.7 mmHg.  Mitral Valve: The mitral valve is normal in structure. There is trace mitral valve regurgitation.  Tricuspid Valve: The tricuspid valve is structurally normal. There is trace to mild tricuspid regurgitation.  Pulmonic Valve: The pulmonic valve is structurally normal. There is physiologic pulmonic valve regurgitation.  Pericardium: There is a small pericardial effusion.  Aorta: The aortic  "root is abnormal. There is moderate dilatation of the ascending aorta. There is mild dilatation of the aortic root.        CONCLUSIONS:   1. The left ventricular systolic function is normal with a 65-70% estimated ejection fraction.   2. Spectral Doppler shows an impaired relaxation pattern of left ventricular diastolic filling.   3. The left atrium is moderately dilated.   4. There is mild aortic valve regurgitation.   5. There is no evidence of a patent foramen ovale.   6. There is moderate dilatation of the ascending aorta.    Ejection Fractions:  No results found for: \"EF\"  Cath:  No results found for this or any previous visit from the past 1095 days.    Stress Test:  NUCLEAR STRESS TEST 07/06/2022  CONCLUSIONS:   1. SPECT Perfusion Study: Normal.   2. There is no scintigraphic evidence for inducible ischemia.   3. No evidence of scarred myocardium.   4. Left ventricle is normal in size. The left ventricle systolic  function is normal.   5. Right ventricle is normal in size. The right ventricle systolic  function is normal.   6. This is a low risk scan.    Cardiac Imaging:  No results found for this or any previous visit from the past 1095 days.      Past Medical History:  He has a past medical history of Actinic keratosis (05/09/2023), Ankylosing spondylitis, Arrhythmia, Arthritis, Cholelithiasis without obstruction (02/25/2020), Coronary artery disease, Cough (10/04/2023), COVID-19 (10/04/2023), Erythema intertrigo (05/09/2023), High prostate specific antigen (PSA) (08/21/2019), Hypertension, Inflamed seborrheic keratosis (05/09/2023), Melanocytic nevi of unspecified upper limb, including shoulder (05/09/2023), Sore throat (10/04/2023), and Stroke (Multi).    Past Surgical History:  He has a past surgical history that includes Coronary artery bypass graft; Tonsillectomy; Colonoscopy; and Cataract extraction.      Social History:  He reports that he quit smoking about 30 years ago. His smoking use included " cigarettes. He has never used smokeless tobacco. He reports current alcohol use of about 7.0 standard drinks of alcohol per week. He reports that he does not use drugs.    Family History:  Family History   Problem Relation Name Age of Onset    Heart disease Mother      Heart disease Father      Allergies Parent      Heart disease Parent          Allergies:  Penicillins, Other, and Statins-hmg-coa reductase inhibitors    Inpatient Medications:  Scheduled medications   Medication Dose Route Frequency    aspirin  81 mg oral Daily    B complex-vitamin C  1 tablet oral Daily    cetirizine  10 mg oral Daily    enoxaparin  40 mg subcutaneous q24h    folic acid  1 mg oral Daily    LORazepam  0.5 mg oral Nightly    metoprolol succinate XL  100 mg oral Daily    pantoprazole  40 mg oral BID AC    thiamine  100 mg oral Daily     PRN medications   Medication    acetaminophen    morphine    nitroglycerin    oxygen     Continuous Medications   Medication Dose Last Rate    sodium chloride 0.9%  75 mL/hr 75 mL/hr (11/20/24 3633)     Outpatient Medications:  Current Outpatient Medications   Medication Instructions    aspirin 81 mg, 2 times daily    loratadine (CLARITIN) 10 mg, oral, Daily PRN    metoprolol succinate XL (Toprol-XL) 100 mg 24 hr tablet Take 1 tablet (100 mg) by mouth. Take 1 tab in the morning, half tablet in the evening.    ondansetron ODT (Zofran-ODT) 4 mg disintegrating tablet Take 1 tablet (4 mg) by mouth every 6 hours if needed for nausea or vomiting    oxyCODONE (ROXICODONE) 5 mg, oral, Every 6 hours PRN    pantoprazole (PROTONIX) 40 mg, oral, 2 times daily before meals, Do not crush, chew, or split.    sulfacetamide sodium-sulfur 9-4 % cleanser 1 Application, Topical, Daily     Physical Exam  Vitals reviewed.   Constitutional:       Appearance: Normal appearance. He is normal weight.   HENT:      Head: Normocephalic and atraumatic.   Neck:      Vascular: No carotid bruit or JVD.   Cardiovascular:      Rate and  Rhythm: Normal rate and regular rhythm.      Pulses: Normal pulses.      Heart sounds: Normal heart sounds.   Pulmonary:      Effort: Pulmonary effort is normal.      Breath sounds: Normal breath sounds.   Chest:      Chest wall: No tenderness.   Abdominal:      General: Abdomen is flat. Bowel sounds are normal.      Palpations: Abdomen is soft.   Skin:     General: Skin is warm and dry.   Neurological:      General: No focal deficit present.      Mental Status: He is alert and oriented to person, place, and time. Mental status is at baseline.   Psychiatric:         Mood and Affect: Mood normal.         Behavior: Behavior normal.            [id number] Score  Age >= 75: 2   Sex Male: 0   CHF History No: 0   HTN Yes: 1   Stroke/TIA/Thromboembolism Yes: 2   Vascular Dz: CAD/PAD/Aortic Plaque Yes: 1   DM No: 0   Total Score 6         Assessment/Plan   Assessment  84 y.o. male with PMH of CVA, CAD s/p mini CABG x1 in 1997 (LIMA to LAD), HTN, HLD, PAF (not on OAC), former smoker who presents with chest tightness with radiation to back, SOB, lightheadedness, and palpitations. Symptoms lasted approximately 2 hours and occurred at rest. He denies exertional symptoms. He reports that he stopped taking Eliquis (due to rash) on his own months ago.  Blood work showed a H&H of 18.3/52.9, . CXR showed no cardiopulmonary process.  EKG shows AF with RBBB. Troponin 19, 21, 26, 15. Home medications include aspirin, Toprol.     CAD s/p mini CABG x1 in 1997 (LIMA to LAD)  PAF  Chest pain  HLD  Prior stroke  Thoracic aortic aneurysm    Plan  -TTE  -CT PE negative - 4.4 cm TAA  -Has had side effects with Xarelto and Eliquis in the past - begin Pradaxa 150 mg BID  -Continue aspirin 81 mg daily  -Statin therapy recommended - previously intolerant to atorvastatin - Begin rosuvastatin 20 mg daily  -Continue home Toprol 100 mg daily  -OP follow up    Peripheral IV 11/19/24 20 G Proximal;Right;Ventral Forearm (Active)   Site Assessment  Clean;Dry;Intact 11/19/24 2100   Dressing Status Clean;Dry 11/19/24 2100   Number of days: 1       Code Status:  Full Code    I spent  minutes in the professional and overall care of this patient.        NUNO Sidhu-CNP

## 2024-11-21 ENCOUNTER — PATIENT OUTREACH (OUTPATIENT)
Dept: PRIMARY CARE | Facility: CLINIC | Age: 84
End: 2024-11-21
Payer: MEDICARE

## 2024-11-21 LAB
EST. AVERAGE GLUCOSE BLD GHB EST-MCNC: 103 MG/DL
HBA1C MFR BLD: 5.2 %

## 2024-11-21 NOTE — PROGRESS NOTES
Discharge Facility: [facility]   Discharge Diagnosis: Chest Pain   Admission Date: 11-19-24  Discharge Date: 11-20-24    PCP Appointment Date:   Specialist Appointment Date: DEC 20 Cardiology Hospital Discharge with Skye Archibald Friday Dec 20, 2024 11:20 AM  Hospital Encounter and Summary Linked: Yes  See discharge assessment below for further details  Medications  Medications reviewed with patient/caregiver?: Yes (11/21/2024 10:49 AM)  Is the patient having any side effects they believe may be caused by any medication additions or changes?: No (11/21/2024 10:49 AM)  Does the patient have all medications ordered at discharge?: Yes (11/21/2024 10:49 AM)  Care Management Interventions: No intervention needed (11/21/2024 10:49 AM)  Prescription Comments: START taking: B complex-vitamin C Start taking on: November 21, 2024 dabigatran etexilate (Pradaxa) folic acid (Folvite) Start taking on: November 21, 2024 rosuvastatin (Crestor) thiamine (Vitamin B-1) Start taking on: November 21, 2024 (11/21/2024 10:49 AM)  Is the patient taking all medications as directed (includes completed medication regime)?: Yes (11/21/2024 10:49 AM)  Care Management Interventions: Provided patient education (11/21/2024 10:49 AM)  Medication Comments: Pt. verbalized understanding of medicatoin (11/21/2024 10:49 AM)    Appointments  Does the patient have a primary care provider?: Yes (11/21/2024 10:49 AM)  Has the patient kept scheduled appointments due by today?: Yes (11/21/2024 10:49 AM)  Care Management Interventions: Advised to schedule with specialist (11/21/2024 10:49 AM)    Self Management  What is the home health agency?: No home going needs (11/21/2024 10:49 AM)  What Durable Medical Equipment (DME) was ordered?: n/a (11/21/2024 10:49 AM)    Patient Teaching  Does the patient have access to their discharge instructions?: Yes (11/21/2024 10:49 AM)  Care Management Interventions: Reviewed instructions with patient (11/21/2024 10:49  AM)  What is the patient's perception of their health status since discharge?: Improving (11/21/2024 10:49 AM)  Is the patient/caregiver able to teach back the hierarchy of who to call/visit for symptoms/problems? PCP, Specialist, Home Health nurse, Urgent Care, ED, 911: Yes (11/21/2024 10:49 AM)  Patient/Caregiver Education Comments: Pt. reports he would like for PCP to review hospital stay and give him a call. Advised pt. I would help him schedule a hopsital follow up. He reports he has to call me back at a later time. (11/21/2024 10:49 AM)

## 2024-11-24 NOTE — DISCHARGE SUMMARY
Discharge Diagnosis  Chest pain in adult    Issues Requiring Follow-Up  TAA 4.4 cm    Discharge Meds     Medication List      START taking these medications     B complex-vitamin C tablet; Take 1 tablet by mouth once daily.   dabigatran etexilate 150 mg capsule; Commonly known as: Pradaxa; Take 1   capsule (150 mg) by mouth 2 times a day. Do not crush or chew.   folic acid 1 mg tablet; Commonly known as: Folvite; Take 1 tablet (1 mg)   by mouth once daily.   rosuvastatin 20 mg tablet; Commonly known as: Crestor; Take 1 tablet (20   mg) by mouth once daily at bedtime.   thiamine 100 mg tablet; Commonly known as: Vitamin B-1; Take 1 tablet   (100 mg) by mouth once daily.     CONTINUE taking these medications     aspirin 81 mg EC tablet   loratadine 10 mg tablet; Commonly known as: Claritin   metoprolol succinate  mg 24 hr tablet; Commonly known as:   Toprol-XL       Test Results Pending At Discharge  Pending Labs       No current pending labs.            Hospital Course   Lv Quinteros is a 84 y.o. male with history of CVA, CAD, hypertension, atrial fibrillation without anticoagulation, and cardiac bypass, who presents after having chest pain for 2 hours.      Acute chest pain rule out ACS  PAF  History of CVA  TAA 4.4 cm  Mild dehydration- resolved  -EKG showed AF with RBBB.   -Troponin 19, 21, 26, 15  -Treated with ASA and statin  -Added lipid panel and TSH  -D. Dimer 642  -CTA chest negative for PE  -Echocardiogram shows EF 65-70%, with Grade II (pseudonormal pattern) of left ventricular diastolic filling.   -Stopped Eliquis due to rash without physician's knowledge a few months ago  -Cardiology recommended discharging home on rosuvastatin and pradaxa 150mg BID  -Follow-up with PCP for thoracic aortic aneurysm surveillance. Recommendation yearly.  -Follow-up with PCP in 1 week for hospital follow-up.  -Cardiology appointment scheduled       Pertinent Physical Exam At Time of Discharge  Physical Exam  HENT:       Mouth/Throat:      Mouth: Mucous membranes are dry.   Eyes:      Extraocular Movements: Extraocular movements intact.   Cardiovascular:      Rate and Rhythm: Rhythm irregular.      Heart sounds: Normal heart sounds.   Abdominal:      Palpations: Abdomen is soft.   Musculoskeletal:         General: No swelling.      Cervical back: Normal range of motion and neck supple.   Neurological:      Mental Status: He is alert and oriented to person, place, and time.   Psychiatric:         Mood and Affect: Mood is calm.     Discharged time greater than 30 minutes.    Outpatient Follow-Up  Future Appointments   Date Time Provider Department Center   12/20/2024 11:20 AM Skye Archibald APRN-CNP [id number] [facility]   4/14/2025  3:00 PM Timothy Velázquez MD [id number] [facility]         Marielena Eubanks APRN-CNP

## 2024-11-26 ENCOUNTER — OFFICE VISIT (OUTPATIENT)
Dept: PRIMARY CARE | Facility: CLINIC | Age: 84
End: 2024-11-26
Payer: MEDICARE

## 2024-11-26 VITALS
HEART RATE: 56 BPM | BODY MASS INDEX: 23.41 KG/M2 | WEIGHT: 163.13 LBS | SYSTOLIC BLOOD PRESSURE: 157 MMHG | DIASTOLIC BLOOD PRESSURE: 85 MMHG | OXYGEN SATURATION: 94 %

## 2024-11-26 DIAGNOSIS — I25.10 CVD (CARDIOVASCULAR DISEASE): ICD-10-CM

## 2024-11-26 DIAGNOSIS — I63.411 CEREBROVASCULAR ACCIDENT (CVA) DUE TO EMBOLISM OF RIGHT MIDDLE CEREBRAL ARTERY (MULTI): ICD-10-CM

## 2024-11-26 DIAGNOSIS — E78.00 HYPERCHOLESTEROLEMIA: ICD-10-CM

## 2024-11-26 DIAGNOSIS — I48.0 PAROXYSMAL ATRIAL FIBRILLATION (MULTI): ICD-10-CM

## 2024-11-26 DIAGNOSIS — I71.21 ANEURYSM OF ASCENDING AORTA WITHOUT RUPTURE (CMS-HCC): Primary | ICD-10-CM

## 2024-11-26 DIAGNOSIS — D69.49 PRIMARY THROMBOCYTOPENIA (MULTI): ICD-10-CM

## 2024-11-26 DIAGNOSIS — I25.810 CORONARY ARTERY DISEASE INVOLVING CORONARY BYPASS GRAFT OF NATIVE HEART WITHOUT ANGINA PECTORIS: ICD-10-CM

## 2024-11-26 DIAGNOSIS — I69.354 HEMIPLEGIA AND HEMIPARESIS FOLLOWING CEREBRAL INFARCTION AFFECTING LEFT NON-DOMINANT SIDE (MULTI): ICD-10-CM

## 2024-11-26 DIAGNOSIS — F41.9 ANXIETY: ICD-10-CM

## 2024-11-26 PROBLEM — E11.65 TYPE 2 DIABETES MELLITUS WITH HYPERGLYCEMIA (MULTI): Status: RESOLVED | Noted: 2023-10-04 | Resolved: 2024-11-26

## 2024-11-26 PROCEDURE — 99496 TRANSJ CARE MGMT HIGH F2F 7D: CPT | Performed by: FAMILY MEDICINE

## 2024-11-26 PROCEDURE — 3079F DIAST BP 80-89 MM HG: CPT | Performed by: FAMILY MEDICINE

## 2024-11-26 PROCEDURE — 1036F TOBACCO NON-USER: CPT | Performed by: FAMILY MEDICINE

## 2024-11-26 PROCEDURE — 1160F RVW MEDS BY RX/DR IN RCRD: CPT | Performed by: FAMILY MEDICINE

## 2024-11-26 PROCEDURE — 3077F SYST BP >= 140 MM HG: CPT | Performed by: FAMILY MEDICINE

## 2024-11-26 PROCEDURE — 1159F MED LIST DOCD IN RCRD: CPT | Performed by: FAMILY MEDICINE

## 2024-11-26 PROCEDURE — 1157F ADVNC CARE PLAN IN RCRD: CPT | Performed by: FAMILY MEDICINE

## 2024-11-26 RX ORDER — DABIGATRAN ETEXILATE 150 MG/1
150 CAPSULE ORAL 2 TIMES DAILY
Qty: 200 CAPSULE | Refills: 0 | Status: SHIPPED | OUTPATIENT
Start: 2024-11-26

## 2024-11-26 RX ORDER — ROSUVASTATIN CALCIUM 20 MG/1
20 TABLET, COATED ORAL NIGHTLY
Qty: 100 TABLET | Refills: 0 | Status: SHIPPED | OUTPATIENT
Start: 2024-11-26

## 2024-11-26 ASSESSMENT — ENCOUNTER SYMPTOMS
NERVOUS/ANXIOUS: 1
CHEST TIGHTNESS: 0
SHORTNESS OF BREATH: 0

## 2024-11-26 NOTE — ASSESSMENT & PLAN NOTE
Statin.  Orders:    Comprehensive Metabolic Panel; Future    Lipid Panel; Future    Creatine Kinase; Future    Follow Up In Primary Care; Future

## 2024-11-26 NOTE — PROGRESS NOTES
"Discharge Facility: [facility]   Discharge Diagnosis: Chest Pain   Admission Date: 11-19-24  Discharge Date: 11-20-24        Subjective   Patient ID: Lv Quinteros is a 84 y.o. male h/o CVA, CAD, paroxysmal atrial fibrillation, anxiety, who presents for Follow-up (Pt presents for TCM SOB, chest tightness.).  HPI Historian(s): Self    Now feeling well, \"for [his] age.\" Chest discomfort/tightness and SOB completely resolved. Believes it was due to stress.    Has not yet started Pradaxa.    Review of Systems   Respiratory:  Negative for chest tightness and shortness of breath.    Cardiovascular:  Negative for chest pain.   Psychiatric/Behavioral:  The patient is nervous/anxious.    All other systems reviewed and are negative.    No LMP for male patient.    Patient Care Team:  Chad Buchanan DO as PCP - General (Family Medicine)  Chad Buchanan DO as PCP - MSSP ACO Attributed Provider  Nicholas Rivas MD (Cardiology)  GRETCHEN Horner as Nurse Practitioner (Dermatology)  GRETCHEN Valderrama as Nurse Practitioner (Gastroenterology)  Aleksandr Vargas LPN as Care Manager (Case Management)    Current Outpatient Medications   Medication Instructions    B complex-vitamin C tablet 1 tablet, oral, Daily    dabigatran etexilate (PRADAXA) 150 mg, oral, 2 times daily, Do not crush or chew.    folic acid (FOLVITE) 1 mg, oral, Daily    loratadine (CLARITIN) 10 mg, Daily PRN    metoprolol succinate XL (Toprol-XL) 100 mg 24 hr tablet Take 1 tablet (100 mg) by mouth. Take 1 tab in the morning, half tablet in the evening.    rosuvastatin (CRESTOR) 20 mg, oral, Nightly    thiamine (VITAMIN B-1) 100 mg, oral, Daily       Objective   /85   Pulse 56   Wt 74 kg (163 lb 2 oz)   SpO2 94%   BMI 23.41 kg/m²           Physical Exam  Vitals and nursing note reviewed.   Constitutional:       General: He is not in acute distress.     Appearance: Normal appearance. He is not diaphoretic.   HENT:      Head: Normocephalic and " atraumatic.   Eyes:      General: No scleral icterus.     Extraocular Movements: Extraocular movements intact.      Conjunctiva/sclera: Conjunctivae normal.   Cardiovascular:      Rate and Rhythm: Normal rate and regular rhythm.      Heart sounds: Normal heart sounds.   Pulmonary:      Effort: Pulmonary effort is normal. No respiratory distress.      Breath sounds: Normal breath sounds. No wheezing, rhonchi or rales.   Musculoskeletal:      Right lower leg: No edema.      Left lower leg: No edema.   Skin:     General: Skin is warm and dry.      Coloration: Skin is not jaundiced.      Findings: Lesion (scalp AK) present.   Neurological:      General: No focal deficit present.      Mental Status: He is alert and oriented to person, place, and time. Mental status is at baseline.   Psychiatric:         Mood and Affect: Mood normal.         Behavior: Behavior normal.         Thought Content: Thought content normal.         Assessment & Plan  Aneurysm of ascending aorta without rupture (CMS-HCC)  Follow-up with new cardiologist (his retired) for monitoring.       CVD (cardiovascular disease)  Continue statin, if tolerable.       Coronary artery disease involving coronary bypass graft of native heart without angina pectoris    Orders:    rosuvastatin (Crestor) 20 mg tablet; Take 1 tablet (20 mg) by mouth once daily at bedtime.    dabigatran etexilate (Pradaxa) 150 mg capsule; Take 1 capsule (150 mg) by mouth 2 times a day. Do not crush or chew.    Paroxysmal atrial fibrillation (Multi)  Continue Pradaxa.       Cerebrovascular accident (CVA) due to embolism of right middle cerebral artery (Multi)  Pradaxa for pAfib.       Hypercholesterolemia  Statin.  Orders:    Comprehensive Metabolic Panel; Future    Lipid Panel; Future    Creatine Kinase; Future    Follow Up In Primary Care; Future    Anxiety  Stable.       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        Primary thrombocytopenia (Multi)  Stable.

## 2024-11-26 NOTE — PATIENT INSTRUCTIONS
"Cardiology  Dr. Cody Vinson, Dr. Courtney Rivera, Dr. Franklin Baptiste, Dr. Kiel Devi, Kiley Sibley CNP, Skye Roberto Araujogege [facility] [phone]  Dr. Prema Foy [phone]  Dr. Jos Patel [phone]    Recommend gradually reducing and eliminating alcohol consumption. Please seek immediate medical attention or call 911 if, as you are stopping the alcohol, you become extremely anxious, agitated, shaky, heart racing, nauseated, confused, or have a seizure. Recommend taking vitamins B12, thiamine, and folate.     Please follow-up with your dermatologist.    Please return for a(n) cholesterol and medication follow-up appointment in 3 months, after tests to review results and options, earlier if any question or concern. Please schedule additional problem-focused appointment(s) to address additional problem(s).    Recommended vaccines:  Influenza, annual  Prevnar-20 \"pneumonia\" vaccine  Respiratory Syncytial Virus (RSV)  Shingrix (shingles) vaccine series  TDaP (tetanus-diphtheria-pertussis) vaccine  Avoid taking Biotin (a vitamin, shows up particularly in hair/nail supplements) for a week prior to any blood tests, as it can interfere with certain results. Fasting for labs means 12 hours, nothing to eat or drink, except water and medications, unless directed otherwise.    For assistance with scheduling referrals or consultations, please call [phone]. For laboratory, radiology, and other tests, please call [phone] ([phone] for pediatrics). Please review prescription inserts and published information for possible adverse effects of all medications. Return after testing or consultation to review results and recommendations, if symptoms persist, change, worsen, or return, or if you have any question or concern. If you do not get results within 7-10 days, or you have any question or concern, please send a message, call the office ([phone]), or return to the office for a follow-up " appointment. For non-emergencies, you may call the office. For emergencies, call 9-1-1 or go to the nearest Emergency Department. Please schedule additional appointment(s) to address concern(s) not addressed today. An annual Wellness visit is strongly recommended. A Wellness visit should be dedicated to addressing routine health maintenance matters (e.g., cancer screenings, cardiovascular screening, etc.). Problem-focused visits, typically prompted by symptoms or specific concerns, are usually conducted separately, particularly if multiple or complex problems need to be addressed.    In general, results are not released or discussed over the telephone, but at an appointment or via  [employer]. Results of tests done through [facility] are released via  [employer] (see below).  [email or web address]   [employer] support line: [phone]

## 2024-11-26 NOTE — ASSESSMENT & PLAN NOTE
Orders:    rosuvastatin (Crestor) 20 mg tablet; Take 1 tablet (20 mg) by mouth once daily at bedtime.    dabigatran etexilate (Pradaxa) 150 mg capsule; Take 1 capsule (150 mg) by mouth 2 times a day. Do not crush or chew.

## 2024-11-29 DIAGNOSIS — I63.411 CEREBROVASCULAR ACCIDENT (CVA) DUE TO EMBOLISM OF RIGHT MIDDLE CEREBRAL ARTERY (MULTI): Primary | ICD-10-CM

## 2024-12-05 ENCOUNTER — PATIENT OUTREACH (OUTPATIENT)
Dept: PRIMARY CARE | Facility: CLINIC | Age: 84
End: 2024-12-05
Payer: MEDICARE

## 2024-12-05 NOTE — PROGRESS NOTES
Call regarding appt. with PCP on (   11-26-24  ) after hospitalization.  At time of outreach call the patient feels as if their condition has improved since last visit.  Reviewed the PCP appointment with the pt and addressed any questions or concerns.    Pt. Denies questions/concerns at this time. Pt. To schedule cardiology appt.

## 2024-12-23 ENCOUNTER — PATIENT OUTREACH (OUTPATIENT)
Dept: PRIMARY CARE | Facility: CLINIC | Age: 84
End: 2024-12-23
Payer: MEDICARE

## 2025-02-14 ENCOUNTER — OFFICE VISIT (OUTPATIENT)
Dept: NEUROLOGY | Facility: CLINIC | Age: 85
End: 2025-02-14
Payer: MEDICARE

## 2025-02-14 VITALS — DIASTOLIC BLOOD PRESSURE: 78 MMHG | HEART RATE: 64 BPM | SYSTOLIC BLOOD PRESSURE: 146 MMHG

## 2025-02-14 DIAGNOSIS — I48.0 PAROXYSMAL ATRIAL FIBRILLATION (MULTI): ICD-10-CM

## 2025-02-14 DIAGNOSIS — F43.21 GRIEF: ICD-10-CM

## 2025-02-14 DIAGNOSIS — E78.00 HYPERCHOLESTEROLEMIA: ICD-10-CM

## 2025-02-14 DIAGNOSIS — I10 ESSENTIAL (PRIMARY) HYPERTENSION: ICD-10-CM

## 2025-02-14 DIAGNOSIS — Z86.73 HISTORY OF EMBOLIC STROKE: Primary | ICD-10-CM

## 2025-02-14 PROCEDURE — 99205 OFFICE O/P NEW HI 60 MIN: CPT | Performed by: PSYCHIATRY & NEUROLOGY

## 2025-02-14 PROCEDURE — 1157F ADVNC CARE PLAN IN RCRD: CPT | Performed by: PSYCHIATRY & NEUROLOGY

## 2025-02-14 PROCEDURE — G2211 COMPLEX E/M VISIT ADD ON: HCPCS | Performed by: PSYCHIATRY & NEUROLOGY

## 2025-02-14 PROCEDURE — 1036F TOBACCO NON-USER: CPT | Performed by: PSYCHIATRY & NEUROLOGY

## 2025-02-14 PROCEDURE — 3077F SYST BP >= 140 MM HG: CPT | Performed by: PSYCHIATRY & NEUROLOGY

## 2025-02-14 PROCEDURE — 3078F DIAST BP <80 MM HG: CPT | Performed by: PSYCHIATRY & NEUROLOGY

## 2025-02-14 PROCEDURE — 99215 OFFICE O/P EST HI 40 MIN: CPT | Performed by: PSYCHIATRY & NEUROLOGY

## 2025-02-14 PROCEDURE — 1160F RVW MEDS BY RX/DR IN RCRD: CPT | Performed by: PSYCHIATRY & NEUROLOGY

## 2025-02-14 PROCEDURE — 1159F MED LIST DOCD IN RCRD: CPT | Performed by: PSYCHIATRY & NEUROLOGY

## 2025-02-14 RX ORDER — ASPIRIN 81 MG/1
81 TABLET ORAL DAILY
COMMUNITY

## 2025-02-14 ASSESSMENT — MINI MENTAL STATE EXAM
WHAT IS THE YEAR, SEASON, DATE, DAY, AND MONTH: 5 CORRECT
PLACE DESIGN, ERASER AND PENCIL IN FRONT OF THE PERSON.  SAY:  COPY THIS DESIGN PLEASE.  SHOW: DESIGN. ALLOW: MULTIPLE TRIES. WAIT UNTIL PERSON IS FINISHED AND HANDS IT BACK. SCORE: ONLY FOR DIAGRAM WITH 4-SIDED FIGURE BETWEEN TWO 5-SIDED FIGURES: 1 CORRECT
SAY: I WOULD LIKE YOU TO REPEAT THIS PHRASE AFTER ME: NO IFS, ANDS, OR BUTS.: 1 CORRECT
SHOW: PENCIL [OBJECT] ASK: WHAT IS THIS CALLED?: 2 CORRECT
WHAT STATE, COUNTRY, CITY, HOSPITAL, FLOOR: 5 CORRECT
NAME OR REPEAT 3 OBJECTS - (APPLE, TABLE, PENNY) OR (BALL, TREE, FLAG): 3 CORRECT
SUM ALL MMSE QUESTIONS FOR TOTAL SCORE [OUT OF 30].: 29
PLEASE COPY THIS PICTURE (NOTE ALL 10 ANGLES MUST BE PRESENT AND TWO MUST INTERSECT): 1 CORRECT
SAY:  READ THE WORDS ON THE PAGE AND THEN DO WHAT IT SAYS.  THEN HAND THE PERSON THE SHEET WITH CLOSE YOUR EYES ON IT.  IF THE SUBJECT READS AND DOES NOT CLOSE THEIR EYES, REPEAT UP TO THREE TIMES.  SCORE ONLY IF SUBJECT CLOSES EYES.: 3 CORRECT
HAND THE PERSON A PENCIL AND PAPER. SAY:  WRITE ANY COMPLETE SENTENCE ON THAT PIECE OF PAPER. (NOTE: THE SENTENCE MUST MAKE SENSE.  IGNORE SPELLING ERRORS): 1 CORRECT
SPELL THE WORD WORLD FORWARD AND BACKWARDS OR SERIAL 7S: 4 CORRECT
RECALL THE 3 OBJECTS FROM ABOVE (APPLE, TABLE, PENNY) OR (BALL, TREE, FLAG): 3 CORRECT

## 2025-02-14 ASSESSMENT — ACTIVITIES OF DAILY LIVING (ADL)
STAIRS: INDEPENDENT
MOBILITY_LEVEL_SURFACES: INDEPENDENT (BUT MAY USE ANY AID FOR EXAMPLE, STICK) > 50 YARDS
TOILET_USE: INDEPENDENT (ON AND OFF, DRESSING, WIPING)
BLADDER: CONTINENT
BOWELS: INDEPENDENT (INCLUDING BUTTONS, ZIPS, LACES, ETC.)
BATHING: INDEPENDENT (OR IN SHOWER)
DRESSING: INDEPENDENT (INCLUDING BUTTONS, ZIPS, LACES,ETC.)
GROOMING: INDEPENDENT FACE/HAIR/TEETH.SHAVING (IMPLEMENTS PROVIDED)
BED_TO_CHAIR_AND_BACK: INDEPENDENT
FEEDING: INDEPENDENT
TOTAL_SCORE: 100

## 2025-02-14 NOTE — PROGRESS NOTES
[facility]   Lv Quinteros is a 84 y.o. year old male presenting for neurologic evaluation.   Referred by: No ref. provider found  PCP: Chad Buchanan, DO    4/2021, 1/2022- Neurology ([facility]) - for stroke 3/11/2021 RMCA initial NIHSS 20 > 15, underwent thrombectomy for RMCAO.  Vascular risk factors- Afib- not on AC at the time, HPL- .  Followup with residual L hemiparesis, behavioral changes post-stroke.  NIHSS 2. 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     02/14/25- Neurology evaluation- his concerns   Emotional incontinence- in specific situations   Depressed mood, still grieves death of wife after her long illness   Less motivated at times- does all ADLs, has girlfriend and 2 companions who spend time with him during the day and help him with a purpose.  Has employees to delegate work.  Looks forward to traveling.     Visual perceptual difficulty in the periphery, restricts driving to low risk scenarios  Shortterm recall- has a calendar for him and friends but admits it is complicated.    May misplace personal items if not placed in the designated location.   Sleeps well, wakens refreshed   Not on AC- eliquis caused a rash but shows his seborrhea on forehead, not sure why he stopped pradaxa      Extensive review of notes in EMR, labs, tests, Interpretation of neuroimaging  4/2024- Car US_ plaque no significant stenosis.   No CT head results found for the past 12 months  No MRI head results found for the past 12 months  Encounter Date: 11/19/24   ECG 12 Lead   Result Value    Ventricular Rate 61    Atrial Rate 61    MD Interval 204    QRS Duration 136    QT Interval 450    QTC Calculation(Bazett) 453    P Axis 62    R Axis -54    T Axis -22    QRS Count 11    Q Onset 210    P Onset 108    P Offset 151    T Offset 435    QTC Fredericia 452     No echocardiogram results found for the past 12 months     Lab Results    Component Value Date    CHOL 186 11/19/2024    TRIG 193 (H) 11/19/2024    HDL 45.0 11/19/2024    CHHDL 4.1 11/19/2024    LDLF 126 (H) 03/12/2021    VLDL 39 11/19/2024    NHDL 141 11/19/2024     Lab Results   Component Value Date     (H) 11/19/2024    HGBA1C 5.2 11/20/2024     Lab Results   Component Value Date    GLUCOSE 100 (H) 11/20/2024     11/20/2024    K 4.3 11/20/2024     11/20/2024    CO2 29 11/20/2024    ANIONGAP 10 11/20/2024    BUN 12 11/20/2024    CREATININE 0.96 11/20/2024    GFRMALE 70 08/08/2023    CALCIUM 8.3 (L) 11/20/2024    PHOS 3.9 03/17/2021    ALBUMIN 4.0 11/19/2024     Lab Results   Component Value Date    CALCIUM 8.3 (L) 11/20/2024    PHOS 3.9 03/17/2021    PROT 6.2 (L) 11/19/2024    ALBUMIN 4.0 11/19/2024    AST 19 11/19/2024    ALT 19 11/19/2024    ALKPHOS 42 11/19/2024    BILITOT 0.8 11/19/2024     Lab Results   Component Value Date    WBC 6.8 11/20/2024    HGB 16.1 11/20/2024    HCT 46.5 11/20/2024    MCV 92 11/20/2024     (L) 11/20/2024     INR   Date Value Ref Range Status   04/14/2024 1.2 (H) 0.9 - 1.1 Final     Lab Results   Component Value Date    TSH 1.35 11/19/2024       Relevant ROS, Problem list, Past Medical/ Surgical/ Family/ Social history- reviewed and pertinent details noted in history.     Objective     Visit Vitals  /78   Pulse 64   Smoking Status Former       Neuro Scores/ Scales:   Modified [place] (mRS) Modified [place] Score: No significant disability.  Able to carry out all usual activities, despite some symptoms.   Barthel Index  Feeding: independent  Bathing: independent (or in shower)  Grooming: independent face/hair/teeth.shaving (implements provided)  Dressing : independent (including buttons, zips, laces,etc.)  Bowels: independent (including buttons, zips, laces, etc.)  Bladder: continent  Toilet Use : independent (on and off, dressing, wiping)  Transfers (Bed to chair and back) : independent  Mobility (On level surfaces):  "independent (but may use any aid for example, stick) > 50 yards  Stairs: independent  Total (0-100): 100   NIHSS: NIH Stroke Scale 1A. Level of Consciousness: Alert, Keenly Responsive, 1B. Ask Month and Age: Both Questions Right, 1C. Blink Eyes & Squeeze Hands: Performs Both Tasks, 2. Best Gaze: Normal, 3. Visual: No Visual Loss, 4. Facial Palsy: Minor Paralysis, 5A. Motor - Left Arm: No Drift, 5B. Motor - Right Arm: No Drift, 6A. Motor - Left Leg: No Drift, 6B. Motor - Right Leg: No Drift, 7. Limb Ataxia: Absent, 8. Sensory Loss: Normal, 9. Best Language: No Aphasia, 10. Dysarthria: Normal, 11. Extinction and Inattention: No Abnormality, NIH Stroke Scale: 1   L facial droop, dysprosodic speech with intermittent emotional incontinence- laughing and crying- all related to intensely emotional situations.   MMSE: MMSE Total Score:: 29  With error related to miscalculation    Assessment/Plan   1. History of embolic stroke    2. Essential (primary) hypertension    3. Hypercholesterolemia    4. Paroxysmal atrial fibrillation (Multi)    5. Grief      PLAN   We restarted your eliquis for stroke prevention with atrial fibrillation.  When you get the prescription, you can stop the aspirin.  Eliquis anticoagulation is far superior to aspirin for stroke prevention.   I placed a referral for Psychiatry to help with your grief.    We reviewed the Goals for STROKE PREVENTION- recommendations to reduce the risk of vascular events and promote brain health include monitoring and management of vascular risk factors and lifestyle choices to goal values.     Cardiovascular disease- Goal is monitoring and management of conditions that increase stroke risk.   Antithrombotic \"blood thinner\" medication- continue anticoagulation for stroke prevention with afib lifelong.    Blood pressure- Normal BP is <120/80 mmHg.  Blood Pressure : Goal is less than 130/80 mmHg  Cholesterol- Ideal lipid profile is an LDL-cholesterol <70 mg/dl, total " "cholesterol <200 mg/dl, fasting triglycerides < 150 mg/dl and HDL-cholesterol >55 mg/dl.   Blood sugar- Blood Sugar : Goal is normal fasting sugar between  mg/dl   Healthy physical activity- Goal is a moderate level of exercise at least 30 minutes/day, most days of the week.   Healthy weight- Goal is an ideal weight with a waistline <40\" for men or <35\" for women, and BMI of 18.5-25.   Healthy diet- is rich in vegetables, fruits, whole grains, legumes and fish, low in salt, and avoids red meats and processed/ refined foods.   Healthy sleep- is restorative, ~7 hours/night, with identification and treatment of obstructive/ central sleep apnea that increases the risk of stroke and heart disease.   Smoking- Goal is to stop smoking any tobacco product and avoid second-hand smoke. For help to quit all forms of tobacco, call 4-274-QUIT-NOW (1-327.758.2842) to speak with a specialist at the [place] [facility].    Alcohol- Goal is moderation; no more than 2 servings for men and 1 serving for non-pregnant women.   Drug use- Goal is avoidance of illicit drugs that can cause blood pressure spikes and damage to blood vessels.   Stroke Warning Signs- know the symptoms of stroke and the importance of calling 911/EMS to access the quickest treatment.     F 6m / PRN    Orders Placed This Encounter   Procedures    Referral to Psychiatry                       "

## 2025-02-14 NOTE — PATIENT INSTRUCTIONS
Thank you for choosing the [facility] [place] for your healthcare.    It was a pleasure to see you in clinic today and be able to participate in your care.     We restarted your eliquis for stroke prevention with atrial fibrillation.  When you get the prescription, you can stop the aspirin.  Eliquis anticoagulation is far superior to aspirin for stroke prevention.   I placed a referral for Psychiatry to help with your grief.      I hope your questions were fully answered but if there are questions or concerns in the future, please feel free to contact my office through [facility] messaging or call [phone].

## 2025-02-20 ENCOUNTER — PATIENT OUTREACH (OUTPATIENT)
Dept: PRIMARY CARE | Facility: CLINIC | Age: 85
End: 2025-02-20
Payer: MEDICARE

## 2025-02-20 NOTE — PROGRESS NOTES
Final call. Called and spoke with patient to address any questions or concerns regarding hospitalization.   Patient reports their condition has improved .   Patient encouraged to keep my contact information in case any needs arise.       Patient states he is looking for a psychiatrist. He has been feeling very anxious and depressed since his stroke. I advised he may need an office visit with Dr. Buchanan to go over these symptoms. His next office visit is 3/24/25.

## 2025-03-12 ENCOUNTER — APPOINTMENT (OUTPATIENT)
Dept: DERMATOLOGY | Facility: CLINIC | Age: 85
End: 2025-03-12
Payer: MEDICARE

## 2025-03-18 RX ORDER — KETOCONAZOLE 20 MG/G
CREAM TOPICAL
COMMUNITY
Start: 2025-02-26

## 2025-03-24 ENCOUNTER — TELEPHONE (OUTPATIENT)
Dept: PRIMARY CARE | Facility: CLINIC | Age: 85
End: 2025-03-24

## 2025-03-24 ENCOUNTER — APPOINTMENT (OUTPATIENT)
Dept: PRIMARY CARE | Facility: CLINIC | Age: 85
End: 2025-03-24
Payer: MEDICARE

## 2025-03-27 ENCOUNTER — TELEPHONE (OUTPATIENT)
Dept: PRIMARY CARE | Facility: CLINIC | Age: 85
End: 2025-03-27
Payer: MEDICARE

## 2025-03-27 DIAGNOSIS — Z00.00 HEALTHCARE MAINTENANCE: Primary | ICD-10-CM

## 2025-03-27 NOTE — TELEPHONE ENCOUNTER
Pt is asking to have a blood work order to check to see if he has had the measles. Pt would like to have it completed Friday morning (03/27/2025) please advise pt when the order has been entered.

## 2025-03-28 NOTE — TELEPHONE ENCOUNTER
"LVM- Per Dr. Buchanan, \"  People born prior to 1956 are generally considered immune due to widespread exposure in their childhood. However, an order to check for his [place] (measles) antibodies has been entered.     "

## 2025-03-28 NOTE — TELEPHONE ENCOUNTER
Result Communication    No results found from the In Basket message.    9:32 AM      Results were not successfully communicated with the patient and they did not acknowledge their understanding.

## 2025-03-31 LAB — MEV IGG SER IA-ACNC: >300 AU/ML

## 2025-04-03 ENCOUNTER — TELEPHONE (OUTPATIENT)
Dept: PRIMARY CARE | Facility: CLINIC | Age: 85
End: 2025-04-03
Payer: MEDICARE

## 2025-04-03 NOTE — TELEPHONE ENCOUNTER
Pt walked into office yesterday asking for the results of his measles lab. Rosaura did not comment on the result, did not give the printout. Advised pt that Rosaura had to sign off on the result, one of us will call when its available to .    Pt is also asking if Rosaura would suggest the shingles vacc.

## 2025-04-04 ENCOUNTER — TELEPHONE (OUTPATIENT)
Dept: PRIMARY CARE | Facility: CLINIC | Age: 85
End: 2025-04-04
Payer: MEDICARE

## 2025-04-04 NOTE — TELEPHONE ENCOUNTER
VM for this patient to advise measles titer per Dr. Buchanan. Advised to call the office for any further questions or concerns.     Consistent with immunity.

## 2025-04-14 ENCOUNTER — APPOINTMENT (OUTPATIENT)
Dept: ALLERGY | Facility: CLINIC | Age: 85
End: 2025-04-14
Payer: MEDICARE

## 2025-04-14 ENCOUNTER — APPOINTMENT (OUTPATIENT)
Dept: PRIMARY CARE | Facility: CLINIC | Age: 85
End: 2025-04-14
Payer: MEDICARE

## 2025-04-14 VITALS — HEART RATE: 61 BPM | SYSTOLIC BLOOD PRESSURE: 125 MMHG | DIASTOLIC BLOOD PRESSURE: 77 MMHG

## 2025-04-14 DIAGNOSIS — J30.1 ALLERGIC RHINITIS DUE TO POLLEN, UNSPECIFIED SEASONALITY: Primary | ICD-10-CM

## 2025-04-14 DIAGNOSIS — J31.0 CHRONIC RHINITIS: ICD-10-CM

## 2025-04-14 PROCEDURE — 99204 OFFICE O/P NEW MOD 45 MIN: CPT | Performed by: ALLERGY & IMMUNOLOGY

## 2025-04-14 PROCEDURE — 95004 PERQ TESTS W/ALRGNC XTRCS: CPT | Performed by: ALLERGY & IMMUNOLOGY

## 2025-04-14 RX ORDER — IPRATROPIUM BROMIDE 42 UG/1
1-2 SPRAY, METERED NASAL 3 TIMES DAILY PRN
Qty: 15 ML | Refills: 11 | Status: SHIPPED | OUTPATIENT
Start: 2025-04-14

## 2025-04-14 RX ORDER — FLUTICASONE PROPIONATE 50 MCG
2 SPRAY, SUSPENSION (ML) NASAL DAILY
Qty: 16 G | Refills: 11 | Status: SHIPPED | OUTPATIENT
Start: 2025-04-14 | End: 2026-04-14

## 2025-04-14 NOTE — PROGRESS NOTES
"Subjective   Patient ID:   [id number]   Lv Quinteros is a 84 y.o. male who presents for Allergies (Has a lot of nasal congestion.  Seems to \"take a lot more air to talk now.\"  Has an itchy rash on his back that is not responding to cortisone cream. No pets.  No food concerns.).    Chief Complaint   Patient presents with    Allergies     Has a lot of nasal congestion.  Seems to \"take a lot more air to talk now.\"  Has an itchy rash on his back that is not responding to cortisone cream. No pets.  No food concerns.          HPI  This patient is here to evaluate for:  Allergic rhinitis and conjunctivitis   Runny nose and feels like he has a head cold   He may have taken flonase - it seems to help.   He is taking them now and also does another spray     Since having a CVA a few years ago, he notices more nasal congesetion.   He is not taking crestor  Has been on metoprolol.  Eliquis - he did stop it because was concerned it caused a rash.   Was eating date cookies so avoiding them also.     Takes Claritin once or twice a day.      Trigger: beer, martini  Not bothered by strong smells.     Had a wheat allergy as a child. He got a rash.   Outgrew it as a teenager.   Does eat wheat now.   Whole wheat is tolerated  White bread gives him indigestion.    SH: Lives with girlfriend; she has coughing affecting her ability to sleep.   No cats or dogs  Baseboard heat    FH:  Mother may have had an allergy.  No siblings.     Review of Systems   All other systems reviewed and are negative.        Objective     /77 (BP Location: Left arm, Patient Position: Sitting)   Pulse 61      Physical Exam  Constitutional:       General: He is not in acute distress.     Appearance: Normal appearance. He is not ill-appearing.   HENT:      Head: Normocephalic and atraumatic.      Right Ear: Tympanic membrane, ear canal and external ear normal.      Left Ear: Tympanic membrane, ear canal and external ear normal.      Nose: Congestion present. " No rhinorrhea.      Comments: Deviated septum     Mouth/Throat:      Mouth: Mucous membranes are moist.      Pharynx: Oropharynx is clear. No oropharyngeal exudate or posterior oropharyngeal erythema.   Eyes:      General:         Right eye: No discharge.         Left eye: No discharge.      Conjunctiva/sclera: Conjunctivae normal.   Cardiovascular:      Rate and Rhythm: Normal rate and regular rhythm.      Heart sounds: Normal heart sounds. No murmur heard.     No friction rub. No gallop.   Pulmonary:      Effort: Pulmonary effort is normal. No respiratory distress.      Breath sounds: Normal breath sounds. No stridor. No wheezing, rhonchi or rales.   Chest:      Chest wall: No tenderness.   Abdominal:      General: Abdomen is flat.      Palpations: Abdomen is soft.   Musculoskeletal:         General: Normal range of motion.      Cervical back: Normal range of motion and neck supple.   Lymphadenopathy:      Cervical: No cervical adenopathy.   Skin:     General: Skin is warm and dry.      Findings: No erythema, lesion or rash.   Neurological:      General: No focal deficit present.      Mental Status: He is alert. Mental status is at baseline.   Psychiatric:         Mood and Affect: Mood normal.         Behavior: Behavior normal.         Thought Content: Thought content normal.         Judgment: Judgment normal.            Current Outpatient Medications   Medication Sig Dispense Refill    apixaban (Eliquis) 5 mg tablet Take 1 tablet (5 mg) by mouth 2 times a day. 60 tablet 11    aspirin 81 mg EC tablet Take 1 tablet (81 mg) by mouth once daily.      ketoconazole (NIZOral) 2 % cream apply to affected area every night      loratadine (Claritin) 10 mg tablet Take 1 tablet (10 mg) by mouth once daily as needed.      metoprolol succinate XL (Toprol-XL) 100 mg 24 hr tablet Take 1 tablet (100 mg) by mouth. Take 1 tab in the morning, half tablet in the evening.      rosuvastatin (Crestor) 20 mg tablet Take 1 tablet (20 mg)  by mouth once daily at bedtime. (Patient not taking: Reported on 2/14/2025) 100 tablet 0     No current facility-administered medications for this visit.       Summary of the labs over the past 6 months:    Telephone on 03/27/2025   Component Date Value Ref Range Status    MEASLES AB (IGG), IMMUNE STATUS 03/28/2025 >300.00  AU/mL Final   Admission on 11/19/2024, Discharged on 11/20/2024   Component Date Value Ref Range Status    WBC 11/19/2024 9.7  4.4 - 11.3 x10*3/uL Final    nRBC 11/19/2024 0.0  0.0 - 0.0 /100 WBCs Final    RBC 11/19/2024 5.85  4.50 - 5.90 x10*6/uL Final    Hemoglobin 11/19/2024 18.3 (H)  13.5 - 17.5 g/dL Final    Hematocrit 11/19/2024 52.9 (H)  41.0 - 52.0 % Final    MCV 11/19/2024 90  80 - 100 fL Final    MCH 11/19/2024 31.3  26.0 - 34.0 pg Final    MCHC 11/19/2024 34.6  32.0 - 36.0 g/dL Final    RDW 11/19/2024 13.5  11.5 - 14.5 % Final    Platelets 11/19/2024 146 (L)  150 - 450 x10*3/uL Final    Neutrophils % 11/19/2024 74.3  40.0 - 80.0 % Final    Immature Granulocytes %, Automated 11/19/2024 0.4  0.0 - 0.9 % Final    Lymphocytes % 11/19/2024 15.0  13.0 - 44.0 % Final    Monocytes % 11/19/2024 7.8  2.0 - 10.0 % Final    Eosinophils % 11/19/2024 2.0  0.0 - 6.0 % Final    Basophils % 11/19/2024 0.5  0.0 - 2.0 % Final    Neutrophils Absolute 11/19/2024 7.20 (H)  1.60 - 5.50 x10*3/uL Final    Immature Granulocytes Absolute, Au* 11/19/2024 0.04  0.00 - 0.50 x10*3/uL Final    Lymphocytes Absolute 11/19/2024 1.45  0.80 - 3.00 x10*3/uL Final    Monocytes Absolute 11/19/2024 0.76  0.05 - 0.80 x10*3/uL Final    Eosinophils Absolute 11/19/2024 0.19  0.00 - 0.40 x10*3/uL Final    Basophils Absolute 11/19/2024 0.05  0.00 - 0.10 x10*3/uL Final    Glucose 11/19/2024 100 (H)  74 - 99 mg/dL Final    Sodium 11/19/2024 141  136 - 145 mmol/L Final    Potassium 11/19/2024 3.7  3.5 - 5.3 mmol/L Final    Chloride 11/19/2024 104  98 - 107 mmol/L Final    Bicarbonate 11/19/2024 30  21 - 32 mmol/L Final    Anion Gap  11/19/2024 11  10 - 20 mmol/L Final    Urea Nitrogen 11/19/2024 13  6 - 23 mg/dL Final    Creatinine 11/19/2024 0.85  0.50 - 1.30 mg/dL Final    eGFR 11/19/2024 86  >60 mL/min/1.73m*2 Final    Calcium 11/19/2024 9.1  8.6 - 10.3 mg/dL Final    Albumin 11/19/2024 4.0  3.4 - 5.0 g/dL Final    Alkaline Phosphatase 11/19/2024 42  33 - 136 U/L Final    Total Protein 11/19/2024 6.2 (L)  6.4 - 8.2 g/dL Final    AST 11/19/2024 19  9 - 39 U/L Final    Bilirubin, Total 11/19/2024 0.8  0.0 - 1.2 mg/dL Final    ALT 11/19/2024 19  10 - 52 U/L Final    Magnesium 11/19/2024 1.80  1.60 - 2.40 mg/dL Final    Ventricular Rate 11/19/2024 81  BPM Final    QRS Duration 11/19/2024 138  ms Final    QT Interval 11/19/2024 404  ms Final    QTC Calculation(Bazett) 11/19/2024 469  ms Final    R Axis 11/19/2024 -55  degrees Final    T Axis 11/19/2024 -17  degrees Final    QRS Count 11/19/2024 13  beats Final    Q Onset 11/19/2024 212  ms Final    T Offset 11/19/2024 414  ms Final    QTC Fredericia 11/19/2024 446  ms Final    Ventricular Rate 11/19/2024 93  BPM Final    QRS Duration 11/19/2024 136  ms Final    QT Interval 11/19/2024 368  ms Final    QTC Calculation(Bazett) 11/19/2024 457  ms Final    R Axis 11/19/2024 -59  degrees Final    T Axis 11/19/2024 -1  degrees Final    QRS Count 11/19/2024 15  beats Final    Q Onset 11/19/2024 212  ms Final    T Offset 11/19/2024 396  ms Final    QTC Fredericia 11/19/2024 426  ms Final    Troponin I, High Sensitivity 11/19/2024 19  0 - 20 ng/L Final    Flu A Result 11/19/2024 Not Detected  Not Detected Final    Flu B Result 11/19/2024 Not Detected  Not Detected Final    Coronavirus 2019, PCR 11/19/2024 Not Detected  Not Detected Final    BNP 11/19/2024 131 (H)  0 - 99 pg/mL Final    Troponin I, High Sensitivity 11/19/2024 21 (H)  0 - 20 ng/L Final    Lipase 11/19/2024 53  9 - 82 U/L Final    Thyroid Stimulating Hormone 11/19/2024 1.47  0.44 - 3.98 mIU/L Final    Ventricular Rate 11/19/2024 83  BPM Final     Atrial Rate 11/19/2024 127  BPM Final    QRS Duration 11/19/2024 144  ms Final    QT Interval 11/19/2024 408  ms Final    QTC Calculation(Bazett) 11/19/2024 479  ms Final    R Axis 11/19/2024 -59  degrees Final    T Axis 11/19/2024 -4  degrees Final    QRS Count 11/19/2024 14  beats Final    Q Onset 11/19/2024 210  ms Final    T Offset 11/19/2024 414  ms Final    QTC Fredericia 11/19/2024 454  ms Final    Thyroid Stimulating Hormone 11/19/2024 1.35  0.44 - 3.98 mIU/L Final    Cholesterol 11/19/2024 186  0 - 199 mg/dL Final    HDL-Cholesterol 11/19/2024 45.0  mg/dL Final    Cholesterol/HDL Ratio 11/19/2024 4.1   Final    LDL Calculated 11/19/2024 102 (H)  <=99 mg/dL Final    VLDL 11/19/2024 39  0 - 40 mg/dL Final    Triglycerides 11/19/2024 193 (H)  0 - 149 mg/dL Final    Non HDL Cholesterol 11/19/2024 141  0 - 149 mg/dL Final    Troponin I, High Sensitivity 11/19/2024 26 (H)  0 - 20 ng/L Final    Hemoglobin A1C 11/20/2024 5.2  See comment % Final    Estimated Average Glucose 11/20/2024 103  Not Established mg/dL Final    Glucose 11/20/2024 100 (H)  74 - 99 mg/dL Final    Sodium 11/20/2024 142  136 - 145 mmol/L Final    Potassium 11/20/2024 4.3  3.5 - 5.3 mmol/L Final    Chloride 11/20/2024 107  98 - 107 mmol/L Final    Bicarbonate 11/20/2024 29  21 - 32 mmol/L Final    Anion Gap 11/20/2024 10  10 - 20 mmol/L Final    Urea Nitrogen 11/20/2024 12  6 - 23 mg/dL Final    Creatinine 11/20/2024 0.96  0.50 - 1.30 mg/dL Final    eGFR 11/20/2024 78  >60 mL/min/1.73m*2 Final    Calcium 11/20/2024 8.3 (L)  8.6 - 10.3 mg/dL Final    WBC 11/20/2024 6.8  4.4 - 11.3 x10*3/uL Final    nRBC 11/20/2024 0.0  0.0 - 0.0 /100 WBCs Final    RBC 11/20/2024 5.08  4.50 - 5.90 x10*6/uL Final    Hemoglobin 11/20/2024 16.1  13.5 - 17.5 g/dL Final    Hematocrit 11/20/2024 46.5  41.0 - 52.0 % Final    MCV 11/20/2024 92  80 - 100 fL Final    MCH 11/20/2024 31.7  26.0 - 34.0 pg Final    MCHC 11/20/2024 34.6  32.0 - 36.0 g/dL Final    RDW  11/20/2024 13.8  11.5 - 14.5 % Final    Platelets 11/20/2024 122 (L)  150 - 450 x10*3/uL Final    D-Dimer Non VTE, Quant (ng/mL FEU) 11/20/2024 642 (H)  <=500 ng/mL FEU Final    Troponin I, High Sensitivity 11/20/2024 15  0 - 20 ng/L Final    POCT Glucose 11/20/2024 97  74 - 99 mg/dL Final    AV pk louie 11/20/2024 1.61  m/s Final    AV mn grad 11/20/2024 6  mmHg Final    LVOT diam 11/20/2024 2.02  cm Final    MV E/A ratio 11/20/2024 1.40   Final    LA vol index A/L 11/20/2024 37.6  ml/m2 Final    Tricuspid annular plane systolic e* 11/20/2024 2.0  cm Final    LV EF 11/20/2024 68  % Final    RV free wall pk S' 11/20/2024 12.00  cm/s Final    LVIDd 11/20/2024 4.76  cm Final    Aortic Valve Area by Continuity of* 11/20/2024 2.05  cm2 Final    Aortic Valve Area by Continuity of* 11/20/2024 2.23  cm2 Final    AV pk grad 11/20/2024 10  mmHg Final    LV A4C EF 11/20/2024 67.8   Final    Ventricular Rate 11/20/2024 61  BPM Preliminary    Atrial Rate 11/20/2024 61  BPM Preliminary    WV Interval 11/20/2024 204  ms Preliminary    QRS Duration 11/20/2024 136  ms Preliminary    QT Interval 11/20/2024 450  ms Preliminary    QTC Calculation(Bazett) 11/20/2024 453  ms Preliminary    P Axis 11/20/2024 62  degrees Preliminary    R Axis 11/20/2024 -54  degrees Preliminary    T Axis 11/20/2024 -22  degrees Preliminary    QRS Count 11/20/2024 11  beats Preliminary    Q Onset 11/20/2024 210  ms Preliminary    P Onset 11/20/2024 108  ms Preliminary    P Offset 11/20/2024 151  ms Preliminary    T Offset 11/20/2024 435  ms Preliminary    QTC Fredericia 11/20/2024 452  ms Preliminary         Assessment/Plan   Diagnoses and all orders for this visit:  Allergic rhinitis due to pollen, unspecified seasonality  -     fluticasone (Flonase) 50 mcg/actuation nasal spray; Administer 2 sprays into each nostril once daily. Shake gently. Before first use, prime pump. After use, clean tip and replace cap.  -     ipratropium (Atrovent) 42 mcg (0.06 %)  nasal spray; Administer 1-2 sprays into each nostril 3 times a day as needed for rhinitis (Start twice a day and increase as needed).  Chronic rhinitis  -     fluticasone (Flonase) 50 mcg/actuation nasal spray; Administer 2 sprays into each nostril once daily. Shake gently. Before first use, prime pump. After use, clean tip and replace cap.  -     ipratropium (Atrovent) 42 mcg (0.06 %) nasal spray; Administer 1-2 sprays into each nostril 3 times a day as needed for rhinitis (Start twice a day and increase as needed).    We performed allergy testing to help determine the etiology of your symptoms. We discussed the results of the testing. Also, we started to focus on treating your problem and we reviewed the management plan.    You have both allergic and non-allergic rhinitis.     I would be happy to see you again as needed. Please feel free to contact my office to schedule a follow-up with our office at [phone].       Timothy Velázquez MD

## 2025-04-16 ENCOUNTER — OFFICE VISIT (OUTPATIENT)
Dept: CARDIOLOGY | Facility: HOSPITAL | Age: 85
End: 2025-04-16
Payer: MEDICARE

## 2025-04-16 VITALS
SYSTOLIC BLOOD PRESSURE: 132 MMHG | DIASTOLIC BLOOD PRESSURE: 67 MMHG | BODY MASS INDEX: 24.23 KG/M2 | HEART RATE: 64 BPM | WEIGHT: 168.87 LBS | OXYGEN SATURATION: 95 %

## 2025-04-16 DIAGNOSIS — I48.91 ATRIAL FIBRILLATION, UNSPECIFIED TYPE (MULTI): Primary | ICD-10-CM

## 2025-04-16 DIAGNOSIS — E78.00 HYPERCHOLESTEROLEMIA: ICD-10-CM

## 2025-04-16 DIAGNOSIS — I71.21 ANEURYSM OF ASCENDING AORTA WITHOUT RUPTURE: ICD-10-CM

## 2025-04-16 DIAGNOSIS — I10 ESSENTIAL (PRIMARY) HYPERTENSION: ICD-10-CM

## 2025-04-16 DIAGNOSIS — I48.0 PAROXYSMAL ATRIAL FIBRILLATION (MULTI): ICD-10-CM

## 2025-04-16 DIAGNOSIS — Z95.1 S/P CABG X 1: ICD-10-CM

## 2025-04-16 LAB
ATRIAL RATE: 66 BPM
P AXIS: 23 DEGREES
P OFFSET: 156 MS
P ONSET: 103 MS
PR INTERVAL: 218 MS
Q ONSET: 212 MS
QRS COUNT: 10 BEATS
QRS DURATION: 138 MS
QT INTERVAL: 440 MS
QTC CALCULATION(BAZETT): 461 MS
QTC FREDERICIA: 454 MS
R AXIS: -60 DEGREES
T AXIS: -28 DEGREES
T OFFSET: 432 MS
VENTRICULAR RATE: 66 BPM

## 2025-04-16 PROCEDURE — 99214 OFFICE O/P EST MOD 30 MIN: CPT | Performed by: INTERNAL MEDICINE

## 2025-04-16 PROCEDURE — 1159F MED LIST DOCD IN RCRD: CPT | Performed by: INTERNAL MEDICINE

## 2025-04-16 PROCEDURE — 1036F TOBACCO NON-USER: CPT | Performed by: INTERNAL MEDICINE

## 2025-04-16 PROCEDURE — 1157F ADVNC CARE PLAN IN RCRD: CPT | Performed by: INTERNAL MEDICINE

## 2025-04-16 PROCEDURE — 3078F DIAST BP <80 MM HG: CPT | Performed by: INTERNAL MEDICINE

## 2025-04-16 PROCEDURE — 93005 ELECTROCARDIOGRAM TRACING: CPT | Performed by: INTERNAL MEDICINE

## 2025-04-16 PROCEDURE — 3075F SYST BP GE 130 - 139MM HG: CPT | Performed by: INTERNAL MEDICINE

## 2025-04-16 NOTE — PROGRESS NOTES
Referred by Dr. Devi for No chief complaint on file.     History Of Present Illness:    Lv Quinteros is a 84 y.o. male presenting to establish cardiac care.  He has a history of single-vessel bypass in 1997, CVA, paroxysmal atrial fibrillation, hypertension, dyslipidemia.  He had been on Eliquis prior--transitioned off of it due to a questionable rash, has been placed back on it and is tolerating okay at this time.  Denies any progressive angina, shortness of breath, dizziness, palpitations.      Past Medical History:  He has a past medical history of Actinic keratosis (05/09/2023), Ankylosing spondylitis, Arrhythmia, Arthritis, Cholelithiasis without obstruction (02/25/2020), Coronary artery disease, Cough (10/04/2023), COVID-19 (10/04/2023), Erythema intertrigo (05/09/2023), High prostate specific antigen (PSA) (08/21/2019), Hypertension, Inflamed seborrheic keratosis (05/09/2023), Melanocytic nevi of unspecified upper limb, including shoulder (05/09/2023), Sore throat (10/04/2023), and Stroke ([facility]).    Past Surgical History:  He has a past surgical history that includes Coronary artery bypass graft; Tonsillectomy; Colonoscopy; and Cataract extraction.      Social History:  He reports that he quit smoking about 31 years ago. His smoking use included cigarettes. He has never used smokeless tobacco. He reports current alcohol use of about 7.0 standard drinks of alcohol per week. He reports that he does not use drugs.    Family History:  Family History[1]     Allergies:  Penicillins, Other, and Statins-hmg-coa reductase inhibitors    Outpatient Medications:  Current Outpatient Medications   Medication Instructions    apixaban (ELIQUIS) 5 mg, oral, 2 times daily    aspirin 81 mg, Daily    fluticasone (Flonase) 50 mcg/actuation nasal spray 2 sprays, Each Nostril, Daily, Shake gently. Before first use, prime pump. After use, clean tip and replace cap.    ipratropium (Atrovent) 42 mcg (0.06 %) nasal spray 1-2  sprays, Each Nostril, 3 times daily PRN    ketoconazole (NIZOral) 2 % cream apply to affected area every night    loratadine (CLARITIN) 10 mg, Daily PRN    metoprolol succinate XL (Toprol-XL) 100 mg 24 hr tablet Take 1 tablet (100 mg) by mouth. Take 1 tab in the morning, half tablet in the evening.    rosuvastatin (CRESTOR) 20 mg, oral, Nightly        Last Recorded Vitals:  Vitals:    04/16/25 1058   BP: 132/67   Pulse: 64   SpO2: 95%   Weight: 76.6 kg (168 lb 14 oz)       Physical Exam:  Constitutional:       Appearance: Healthy appearance. Not in distress.   Neck:      Vascular: No JVR. JVD normal.   Pulmonary:      Effort: Pulmonary effort is normal.      Breath sounds: Normal breath sounds. No wheezing. No rhonchi. No rales.   Chest:      Chest wall: Not tender to palpatation.   Cardiovascular:      Normal rate. Regular rhythm.      Murmurs: There is no murmur.   Edema:     Peripheral edema absent.   Abdominal:      General: Bowel sounds are normal.      Palpations: Abdomen is soft.      Tenderness: There is no abdominal tenderness.   Musculoskeletal: Normal range of motion. Skin:     General: Skin is warm and dry.   Neurological:      General: No focal deficit present.      Mental Status: Alert and oriented to person, place and time.             Last Labs:  CBC -  Lab Results   Component Value Date    WBC 6.8 11/20/2024    HGB 16.1 11/20/2024    HCT 46.5 11/20/2024    MCV 92 11/20/2024     (L) 11/20/2024       CMP -  Lab Results   Component Value Date    CALCIUM 8.3 (L) 11/20/2024    PHOS 3.9 03/17/2021    PROT 6.2 (L) 11/19/2024    ALBUMIN 4.0 11/19/2024    AST 19 11/19/2024    ALT 19 11/19/2024    ALKPHOS 42 11/19/2024    BILITOT 0.8 11/19/2024       LIPID PANEL -   Lab Results   Component Value Date    CHOL 186 11/19/2024    TRIG 193 (H) 11/19/2024    HDL 45.0 11/19/2024    CHHDL 4.1 11/19/2024    LDLF 126 (H) 03/12/2021    VLDL 39 11/19/2024    NHDL 141 11/19/2024       RENAL FUNCTION PANEL -   Lab  Results   Component Value Date    GLUCOSE 100 (H) 11/20/2024     11/20/2024    K 4.3 11/20/2024     11/20/2024    CO2 29 11/20/2024    ANIONGAP 10 11/20/2024    BUN 12 11/20/2024    CREATININE 0.96 11/20/2024    GFRMALE 70 08/08/2023    CALCIUM 8.3 (L) 11/20/2024    PHOS 3.9 03/17/2021    ALBUMIN 4.0 11/19/2024        Lab Results   Component Value Date     (H) 11/19/2024    HGBA1C 5.2 11/20/2024       Last Cardiology Tests:  ECG independently reviewed from today: Sinus rhythm with PACs and pattern of bigeminy, right bundle branch block    Echo 11/2024:   1. The left ventricular systolic function is normal, with a visually estimated ejection fraction of 65-70%.   2. Spectral Doppler shows a Grade II (pseudonormal pattern) of left ventricular diastolic filling.   3. There is moderate concentric left ventricular hypertrophy.   4. There is normal right ventricular global systolic function.   5. The left atrium is mildly dilated.   6. Aortic valve sclerosis.   7. Mild aortic valve regurgitation.   8. There is mild dilatation of the ascending aorta.       Assessment/Plan   Very pleasant 84 year-old gentleman with history of CVA, CAD s/p mini CABG x1 in 1997 (LIMA to LAD), HTN, HLD, PAF (not on OAC), former smoker, TAA (4.4cm).     Doing well clinically at this time, blood pressure well-controlled.  He is now tolerating apixaban.    Plan:  - Continue current aspirin, apixaban, rosuvastatin, and Toprol-XL  - Follow-up in 1 year or sooner if needed        Kiel Devi MD         [1]   Family History  Problem Relation Name Age of Onset    Heart disease Mother      Heart disease Father      Allergies Parent      Heart disease Parent

## 2025-05-01 ENCOUNTER — HOSPITAL ENCOUNTER (INPATIENT)
Facility: HOSPITAL | Age: 85
LOS: 2 days | Discharge: HOME | End: 2025-05-04
Attending: STUDENT IN AN ORGANIZED HEALTH CARE EDUCATION/TRAINING PROGRAM | Admitting: INTERNAL MEDICINE
Payer: MEDICARE

## 2025-05-01 ENCOUNTER — APPOINTMENT (OUTPATIENT)
Dept: CARDIOLOGY | Facility: HOSPITAL | Age: 85
DRG: 177 | End: 2025-05-01
Payer: MEDICARE

## 2025-05-01 ENCOUNTER — OFFICE VISIT (OUTPATIENT)
Dept: PRIMARY CARE | Facility: CLINIC | Age: 85
End: 2025-05-01
Payer: MEDICARE

## 2025-05-01 ENCOUNTER — APPOINTMENT (OUTPATIENT)
Dept: RADIOLOGY | Facility: HOSPITAL | Age: 85
DRG: 177 | End: 2025-05-01
Payer: MEDICARE

## 2025-05-01 VITALS
BODY MASS INDEX: 24.12 KG/M2 | HEART RATE: 63 BPM | OXYGEN SATURATION: 93 % | SYSTOLIC BLOOD PRESSURE: 158 MMHG | TEMPERATURE: 98.4 F | WEIGHT: 168.13 LBS | DIASTOLIC BLOOD PRESSURE: 73 MMHG

## 2025-05-01 DIAGNOSIS — J96.01 ACUTE RESPIRATORY FAILURE WITH HYPOXIA: ICD-10-CM

## 2025-05-01 DIAGNOSIS — G47.00 INSOMNIA, UNSPECIFIED TYPE: ICD-10-CM

## 2025-05-01 DIAGNOSIS — J40 BRONCHITIS: Primary | ICD-10-CM

## 2025-05-01 DIAGNOSIS — J30.1 ALLERGIC RHINITIS DUE TO POLLEN, UNSPECIFIED SEASONALITY: ICD-10-CM

## 2025-05-01 DIAGNOSIS — A41.9 SEPSIS, DUE TO UNSPECIFIED ORGANISM, UNSPECIFIED WHETHER ACUTE ORGAN DYSFUNCTION PRESENT (MULTI): ICD-10-CM

## 2025-05-01 DIAGNOSIS — R79.89 ELEVATED BRAIN NATRIURETIC PEPTIDE (BNP) LEVEL: ICD-10-CM

## 2025-05-01 DIAGNOSIS — J18.9 PNEUMONIA DUE TO INFECTIOUS ORGANISM, UNSPECIFIED LATERALITY, UNSPECIFIED PART OF LUNG: Primary | ICD-10-CM

## 2025-05-01 DIAGNOSIS — E78.00 HYPERCHOLESTEROLEMIA: ICD-10-CM

## 2025-05-01 DIAGNOSIS — J31.0 CHRONIC RHINITIS: ICD-10-CM

## 2025-05-01 DIAGNOSIS — I25.810 CORONARY ARTERY DISEASE INVOLVING CORONARY BYPASS GRAFT OF NATIVE HEART WITHOUT ANGINA PECTORIS: ICD-10-CM

## 2025-05-01 DIAGNOSIS — R79.89 ELEVATED TROPONIN: ICD-10-CM

## 2025-05-01 LAB
ABO GROUP (TYPE) IN BLOOD: NORMAL
ALBUMIN SERPL BCP-MCNC: 4.3 G/DL (ref 3.4–5)
ALP SERPL-CCNC: 46 U/L (ref 33–136)
ALT SERPL W P-5'-P-CCNC: 23 U/L (ref 10–52)
ANION GAP SERPL CALC-SCNC: 12 MMOL/L (ref 10–20)
ANTIBODY SCREEN: NORMAL
APTT PPP: 32 SECONDS (ref 26–36)
AST SERPL W P-5'-P-CCNC: 18 U/L (ref 9–39)
BASOPHILS # BLD AUTO: 0.05 X10*3/UL (ref 0–0.1)
BASOPHILS NFR BLD AUTO: 0.4 %
BILIRUB SERPL-MCNC: 1 MG/DL (ref 0–1.2)
BNP SERPL-MCNC: 300 PG/ML (ref 0–99)
BUN SERPL-MCNC: 11 MG/DL (ref 6–23)
CALCIUM SERPL-MCNC: 8.7 MG/DL (ref 8.6–10.3)
CARDIAC TROPONIN I PNL SERPL HS: 16 NG/L (ref 0–20)
CARDIAC TROPONIN I PNL SERPL HS: 24 NG/L (ref 0–20)
CHLORIDE SERPL-SCNC: 102 MMOL/L (ref 98–107)
CO2 SERPL-SCNC: 26 MMOL/L (ref 21–32)
CREAT SERPL-MCNC: 0.82 MG/DL (ref 0.5–1.3)
EGFRCR SERPLBLD CKD-EPI 2021: 87 ML/MIN/1.73M*2
EOSINOPHIL # BLD AUTO: 0.1 X10*3/UL (ref 0–0.4)
EOSINOPHIL NFR BLD AUTO: 0.9 %
ERYTHROCYTE [DISTWIDTH] IN BLOOD BY AUTOMATED COUNT: 12.8 % (ref 11.5–14.5)
GLUCOSE SERPL-MCNC: 103 MG/DL (ref 74–99)
HCT VFR BLD AUTO: 52.1 % (ref 41–52)
HGB BLD-MCNC: 18.4 G/DL (ref 13.5–17.5)
IMM GRANULOCYTES # BLD AUTO: 0.04 X10*3/UL (ref 0–0.5)
IMM GRANULOCYTES NFR BLD AUTO: 0.4 % (ref 0–0.9)
INR PPP: 1.4 (ref 0.9–1.1)
LYMPHOCYTES # BLD AUTO: 0.65 X10*3/UL (ref 0.8–3)
LYMPHOCYTES NFR BLD AUTO: 5.8 %
MAGNESIUM SERPL-MCNC: 1.69 MG/DL (ref 1.6–2.4)
MCH RBC QN AUTO: 32.2 PG (ref 26–34)
MCHC RBC AUTO-ENTMCNC: 35.3 G/DL (ref 32–36)
MCV RBC AUTO: 91 FL (ref 80–100)
MONOCYTES # BLD AUTO: 0.6 X10*3/UL (ref 0.05–0.8)
MONOCYTES NFR BLD AUTO: 5.4 %
NEUTROPHILS # BLD AUTO: 9.68 X10*3/UL (ref 1.6–5.5)
NEUTROPHILS NFR BLD AUTO: 87.1 %
NRBC BLD-RTO: 0 /100 WBCS (ref 0–0)
PLATELET # BLD AUTO: 129 X10*3/UL (ref 150–450)
POTASSIUM SERPL-SCNC: 3.8 MMOL/L (ref 3.5–5.3)
PROT SERPL-MCNC: 6.6 G/DL (ref 6.4–8.2)
PROTHROMBIN TIME: 16 SECONDS (ref 9.8–12.4)
RBC # BLD AUTO: 5.71 X10*6/UL (ref 4.5–5.9)
RH FACTOR (ANTIGEN D): NORMAL
SARS-COV-2 RNA RESP QL NAA+PROBE: NOT DETECTED
SODIUM SERPL-SCNC: 136 MMOL/L (ref 136–145)
WBC # BLD AUTO: 11.1 X10*3/UL (ref 4.4–11.3)

## 2025-05-01 PROCEDURE — 85610 PROTHROMBIN TIME: CPT

## 2025-05-01 PROCEDURE — 2500000001 HC RX 250 WO HCPCS SELF ADMINISTERED DRUGS (ALT 637 FOR MEDICARE OP)

## 2025-05-01 PROCEDURE — 71045 X-RAY EXAM CHEST 1 VIEW: CPT | Mod: FOREIGN READ | Performed by: RADIOLOGY

## 2025-05-01 PROCEDURE — 71045 X-RAY EXAM CHEST 1 VIEW: CPT

## 2025-05-01 PROCEDURE — 84484 ASSAY OF TROPONIN QUANT: CPT

## 2025-05-01 PROCEDURE — 3077F SYST BP >= 140 MM HG: CPT | Performed by: FAMILY MEDICINE

## 2025-05-01 PROCEDURE — 1159F MED LIST DOCD IN RCRD: CPT | Performed by: FAMILY MEDICINE

## 2025-05-01 PROCEDURE — 2500000002 HC RX 250 W HCPCS SELF ADMINISTERED DRUGS (ALT 637 FOR MEDICARE OP, ALT 636 FOR OP/ED)

## 2025-05-01 PROCEDURE — 36415 COLL VENOUS BLD VENIPUNCTURE: CPT

## 2025-05-01 PROCEDURE — 1157F ADVNC CARE PLAN IN RCRD: CPT | Performed by: FAMILY MEDICINE

## 2025-05-01 PROCEDURE — 87635 SARS-COV-2 COVID-19 AMP PRB: CPT

## 2025-05-01 PROCEDURE — 99213 OFFICE O/P EST LOW 20 MIN: CPT | Performed by: FAMILY MEDICINE

## 2025-05-01 PROCEDURE — 85025 COMPLETE CBC W/AUTO DIFF WBC: CPT

## 2025-05-01 PROCEDURE — 1160F RVW MEDS BY RX/DR IN RCRD: CPT | Performed by: FAMILY MEDICINE

## 2025-05-01 PROCEDURE — 3078F DIAST BP <80 MM HG: CPT | Performed by: FAMILY MEDICINE

## 2025-05-01 PROCEDURE — 1036F TOBACCO NON-USER: CPT | Performed by: FAMILY MEDICINE

## 2025-05-01 PROCEDURE — 83735 ASSAY OF MAGNESIUM: CPT

## 2025-05-01 PROCEDURE — 93005 ELECTROCARDIOGRAM TRACING: CPT

## 2025-05-01 PROCEDURE — 99291 CRITICAL CARE FIRST HOUR: CPT | Performed by: STUDENT IN AN ORGANIZED HEALTH CARE EDUCATION/TRAINING PROGRAM

## 2025-05-01 PROCEDURE — 94640 AIRWAY INHALATION TREATMENT: CPT

## 2025-05-01 PROCEDURE — 2500000004 HC RX 250 GENERAL PHARMACY W/ HCPCS (ALT 636 FOR OP/ED): Mod: JZ

## 2025-05-01 PROCEDURE — 80053 COMPREHEN METABOLIC PANEL: CPT

## 2025-05-01 PROCEDURE — 96361 HYDRATE IV INFUSION ADD-ON: CPT

## 2025-05-01 PROCEDURE — 83880 ASSAY OF NATRIURETIC PEPTIDE: CPT

## 2025-05-01 PROCEDURE — 86901 BLOOD TYPING SEROLOGIC RH(D): CPT

## 2025-05-01 PROCEDURE — 86850 RBC ANTIBODY SCREEN: CPT

## 2025-05-01 RX ORDER — ACETAMINOPHEN 325 MG/1
975 TABLET ORAL ONCE
Status: COMPLETED | OUTPATIENT
Start: 2025-05-01 | End: 2025-05-01

## 2025-05-01 RX ORDER — ACETAMINOPHEN 325 MG/1
TABLET ORAL
Status: COMPLETED
Start: 2025-05-01 | End: 2025-05-01

## 2025-05-01 RX ORDER — AZITHROMYCIN 250 MG/1
TABLET, FILM COATED ORAL
Qty: 6 TABLET | Refills: 0 | Status: ON HOLD | OUTPATIENT
Start: 2025-05-01 | End: 2025-05-02 | Stop reason: WASHOUT

## 2025-05-01 RX ORDER — IPRATROPIUM BROMIDE AND ALBUTEROL SULFATE 2.5; .5 MG/3ML; MG/3ML
3 SOLUTION RESPIRATORY (INHALATION)
Status: DISCONTINUED | OUTPATIENT
Start: 2025-05-01 | End: 2025-05-02

## 2025-05-01 RX ADMIN — ACETAMINOPHEN 975 MG: 325 TABLET ORAL at 20:54

## 2025-05-01 RX ADMIN — SODIUM CHLORIDE 500 ML: 0.9 INJECTION, SOLUTION INTRAVENOUS at 22:16

## 2025-05-01 RX ADMIN — IPRATROPIUM BROMIDE AND ALBUTEROL SULFATE 3 ML: 2.5; .5 SOLUTION RESPIRATORY (INHALATION) at 22:13

## 2025-05-01 ASSESSMENT — PAIN SCALES - GENERAL
PAINLEVEL_OUTOF10: 0 - NO PAIN
PAINLEVEL_OUTOF10: 0 - NO PAIN

## 2025-05-01 ASSESSMENT — ENCOUNTER SYMPTOMS
SHORTNESS OF BREATH: 0
FEVER: 0
COUGH: 1
DIAPHORESIS: 0
SORE THROAT: 1
VOICE CHANGE: 1

## 2025-05-01 ASSESSMENT — PAIN - FUNCTIONAL ASSESSMENT: PAIN_FUNCTIONAL_ASSESSMENT: 0-10

## 2025-05-01 NOTE — PATIENT INSTRUCTIONS
Anticipate usual course of viral upper respiratory illness. Worst of symptoms typically lasting for about 7 days, often peaking around day 5. Improvement should typically begin between days 7-10 of illness. Resolution of symptoms typically within 2-3 weeks. Some things that might indicate something else is going on, or has developed: Fever starting after day 5 of illness, worsening of condition after day 7 of illness, not beginning to have improvement by day 10 of illness, fever (100.4 or higher) going away for 48 hours then returning, sharp stabbing ear pain, pain/redness/swelling localized over a sinus cavity, or severe or rapidly worsening symptoms.    If appropriate, Afrin/oxymetazoline for 3 days can be very helpful, for teens and adults (children 6-12 only with adult supervision). Nasal steroids (e.g., Flonase, Nasacort, etc.) may be a safer option, though not as effective. Nasal saline can also help thin the mucus to make it drain out more easily. A nasal antihistamine spray (e.g., Azelastine) can be very helpful for allergies, as can nasal steroid sprays.    For a sore throat, you may try salt water gargles, straight honey. Adults may try Cepacol lozenges, Chloraseptic throat spray.    For a cough, you may try Delsym/dextromethorphan. Adults may try Coricidin HBP, Benzonatate/Tessalon Perles, cough drops.    Please return or seek medical attention if symptoms persist, change, worsen, or return. For emergencies, call 9-1-1 or go to the nearest Emergency Room. Please schedule additional problem-focused appointment(s) to address additional problem(s). Simply mentioning or talking briefly about a problem or concern does not necessarily mean it is currently being addressed. Time constraints dictate that not every problem/concern can always be addressed.    Avoid taking Biotin (a vitamin, shows up particularly in hair/nail supplements) for a week prior to any blood tests, as it can interfere with certain results.  Fasting for labs means 12 hours, nothing to eat or drink, except water and medications, unless directed otherwise.    For assistance with scheduling referrals or consultations, please call [phone]. For laboratory, radiology, and other tests, please call [phone] ([phone] for pediatrics). Please review prescription inserts and published information for possible adverse effects of all medications. Return after testing or consultation to review results and recommendations, if symptoms persist, change, worsen, or return, or if you have any question or concern. If you do not get results within 7-10 days, or you have any question or concern, please send a message, call the office ([phone]), or return to the office for a follow-up appointment. For non-emergencies, you may call the office. For emergencies, call 9-1-1 or go to the nearest Emergency Department. Please schedule additional appointment(s) to address concern(s) not addressed today. An annual Wellness visit is strongly recommended. A Wellness visit should be dedicated to addressing routine health maintenance matters (e.g., cancer screenings, cardiovascular screening, etc.). Problem-focused visits, typically prompted by symptoms or specific concerns, are usually conducted separately, particularly if multiple or complex problems need to be addressed.    In general, results are not released or discussed over the telephone, but at an appointment or via  [employer]. Results of tests done through [facility] are released via  [employer] (see below).  [email or web address]   [employer] support line: [phone]

## 2025-05-01 NOTE — PROGRESS NOTES
Subjective   Patient ID: Lv Quinteros is a 84 y.o. male who presents for Sick Visit (Pt presents sore throat, cough, no temp, x 1 day.).  HPI Historian(s): Self    c/o cough, sore throat. Recently returned from [place] 4d ago.  Onset of symptoms was 1 day ago.  Symptoms have been unchanged since that time.  Severity is mild.  Tried Tylenol, Claritin, vitamin C.  Improved or relieved by Tylenol.  Exacerbated by nothing.  Also c/o fatigue, runny nose, diarrhea, right ear itchy.  Denies fever, sweats, NVD.    Review of Systems   Constitutional:  Negative for diaphoresis and fever.   HENT:  Positive for sore throat and voice change.    Respiratory:  Positive for cough. Negative for shortness of breath.    All other systems reviewed and are negative.    No LMP for male patient.    Tobacco Use History[1]  Social History     Substance and Sexual Activity   Alcohol Use Yes    Alcohol/week: 7.0 standard drinks of alcohol    Types: 7 Cans of beer per week    Comment: 1-2 cocktails daily     Social History     Substance and Sexual Activity   Drug Use Never       Family History[2]    Patient Care Team:  Chad Buchanan DO as PCP - General (Family Medicine)  Chad Buch[facility] DO [facility] PCP [facility]ributed [facility]  Nicholas Rivas MD (Cardiology)  GRETCHEN Horner as Nurse Practitioner (Dermatology)  GRETCHEN Valderrama as Nurse Practitioner (Gastroenterology)    RX Allergies[3]    Prior to Admission medications    Medication Sig Start Date End Date Taking? Authorizing Provider   apixaban (Eliquis) 5 mg tablet Take 1 tablet (5 mg) by mouth 2 times a day. 2/14/25 2/14/26 Yes Gely Gore MD   fluticasone (Flonase) 50 mcg/actuation nasal spray Administer 2 sprays into each nostril once daily. Shake gently. Before first use, prime pump. After use, clean tip and replace cap. 4/14/25 4/14/26 Yes Timothy Velázquez MD   ipratropium (Atrovent) 42 mcg (0.06 %) nasal spray Administer 1-2 sprays into each nostril 3  times a day as needed for rhinitis (Start twice a day and increase as needed). 4/14/25  Yes Timothy Velázquez MD   ketoconazole (NIZOral) 2 % cream apply to affected area every night 2/26/25  Yes Historical Provider, MD   loratadine (Claritin) 10 mg tablet Take 1 tablet (10 mg) by mouth once daily as needed.   Yes Historical Provider, MD   metoprolol succinate XL (Toprol-XL) 100 mg 24 hr tablet Take 1 tablet (100 mg) by mouth. Take 1 tab in the morning, half tablet in the evening. 6/29/23  Yes Historical Provider, MD   rosuvastatin (Crestor) 20 mg tablet Take 1 tablet (20 mg) by mouth once daily at bedtime. 11/26/24  Yes Chad Buchanan DO   aspirin 81 mg EC tablet Take 1 tablet (81 mg) by mouth once daily.  5/1/25  Historical Provider, MD        Objective   /73   Pulse 63   Temp 36.9 °C (98.4 °F)   Wt 76.3 kg (168 lb 2 oz)   SpO2 93%   BMI 24.12 kg/m²           Physical Exam  Vitals and nursing note reviewed.   Constitutional:       General: He is not in acute distress.     Appearance: Normal appearance. He is not diaphoretic.      Comments: No assistive device presently being used.   HENT:      Head: Normocephalic and atraumatic.      Right Ear: Tympanic membrane, ear canal and external ear normal. There is no impacted cerumen.      Left Ear: Tympanic membrane, ear canal and external ear normal. There is no impacted cerumen.      Nose: Congestion and rhinorrhea present.      Mouth/Throat:      Mouth: Mucous membranes are moist.      Pharynx: Oropharynx is clear. No posterior oropharyngeal erythema.   Eyes:      General: No scleral icterus.     Extraocular Movements: Extraocular movements intact.      Conjunctiva/sclera: Conjunctivae normal.   Cardiovascular:      Rate and Rhythm: Normal rate and regular rhythm.      Heart sounds: Normal heart sounds.   Pulmonary:      Effort: Pulmonary effort is normal. No respiratory distress.      Breath sounds: Normal breath sounds. No wheezing, rhonchi or  rales.   Musculoskeletal:      Cervical back: Normal range of motion and neck supple. No tenderness.   Lymphadenopathy:      Cervical: No cervical adenopathy.   Skin:     General: Skin is warm and dry.      Coloration: Skin is not jaundiced.   Neurological:      General: No focal deficit present.      Mental Status: He is alert and oriented to person, place, and time. Mental status is at baseline.   Psychiatric:         Mood and Affect: Mood normal.         Behavior: Behavior normal.         Thought Content: Thought content normal.         Assessment & Plan  Bronchitis  Day 1 supportive care, antibiotic if persistent or worsening symptoms.  Orders:    azithromycin (Zithromax) 250 mg tablet; Take 2 tablets (500 mg) by mouth once daily for 1 day, THEN 1 tablet (250 mg) once daily for 4 days.    Hypercholesterolemia    Orders:    Lipid Panel; Future    Comprehensive Metabolic Panel; Future    Creatine Kinase; Future                      [1]   Social History  Tobacco Use   Smoking Status Former    Current packs/day: 0.00    Types: Cigarettes    Quit date:     Years since quittin.3   Smokeless Tobacco Never   [2]   Family History  Problem Relation Name Age of Onset    Heart disease Mother      Heart disease Father      Allergies Parent      Heart disease Parent     [3]   Allergies  Allergen Reactions    Penicillins Hives, Rash, Shortness of breath and Unknown    Other Other     flushing, near-syncope    Statins-Hmg-Coa Reductase Inhibitors Unknown     muscle weakness on Lipitor; crestor ok

## 2025-05-01 NOTE — ASSESSMENT & PLAN NOTE
Orders:    Lipid Panel; Future    Comprehensive Metabolic Panel; Future    Creatine Kinase; Future

## 2025-05-02 ENCOUNTER — APPOINTMENT (OUTPATIENT)
Dept: CARDIOLOGY | Facility: HOSPITAL | Age: 85
DRG: 177 | End: 2025-05-02
Payer: MEDICARE

## 2025-05-02 ENCOUNTER — APPOINTMENT (OUTPATIENT)
Dept: RADIOLOGY | Facility: HOSPITAL | Age: 85
DRG: 177 | End: 2025-05-02
Payer: MEDICARE

## 2025-05-02 PROBLEM — J18.9 PNEUMONIA DUE TO INFECTIOUS ORGANISM, UNSPECIFIED LATERALITY, UNSPECIFIED PART OF LUNG: Status: ACTIVE | Noted: 2025-05-02

## 2025-05-02 LAB
ALBUMIN SERPL BCP-MCNC: 3.8 G/DL (ref 3.4–5)
ANION GAP SERPL CALC-SCNC: 13 MMOL/L (ref 10–20)
APPEARANCE UR: CLEAR
ATRIAL RATE: 75 BPM
BASOPHILS # BLD AUTO: 0.02 X10*3/UL (ref 0–0.1)
BASOPHILS NFR BLD AUTO: 0.2 %
BILIRUB UR STRIP.AUTO-MCNC: NEGATIVE MG/DL
BUN SERPL-MCNC: 11 MG/DL (ref 6–23)
CALCIUM SERPL-MCNC: 8.5 MG/DL (ref 8.6–10.3)
CARDIAC TROPONIN I PNL SERPL HS: 65 NG/L (ref 0–20)
CARDIAC TROPONIN I PNL SERPL HS: 78 NG/L (ref 0–20)
CHLORIDE SERPL-SCNC: 100 MMOL/L (ref 98–107)
CO2 SERPL-SCNC: 27 MMOL/L (ref 21–32)
COLOR UR: ABNORMAL
CREAT SERPL-MCNC: 0.88 MG/DL (ref 0.5–1.3)
EGFRCR SERPLBLD CKD-EPI 2021: 85 ML/MIN/1.73M*2
EOSINOPHIL # BLD AUTO: 0 X10*3/UL (ref 0–0.4)
EOSINOPHIL NFR BLD AUTO: 0 %
ERYTHROCYTE [DISTWIDTH] IN BLOOD BY AUTOMATED COUNT: 13.2 % (ref 11.5–14.5)
FLUAV RNA RESP QL NAA+PROBE: NOT DETECTED
FLUBV RNA RESP QL NAA+PROBE: NOT DETECTED
GLUCOSE SERPL-MCNC: 95 MG/DL (ref 74–99)
GLUCOSE UR STRIP.AUTO-MCNC: NORMAL MG/DL
HCT VFR BLD AUTO: 50.7 % (ref 41–52)
HGB BLD-MCNC: 17.4 G/DL (ref 13.5–17.5)
HOLD SPECIMEN: 293
IMM GRANULOCYTES # BLD AUTO: 0.03 X10*3/UL (ref 0–0.5)
IMM GRANULOCYTES NFR BLD AUTO: 0.3 % (ref 0–0.9)
KETONES UR STRIP.AUTO-MCNC: NEGATIVE MG/DL
LACTATE SERPL-SCNC: 1.5 MMOL/L (ref 0.4–2)
LACTATE SERPL-SCNC: 2.6 MMOL/L (ref 0.4–2)
LEUKOCYTE ESTERASE UR QL STRIP.AUTO: NEGATIVE
LYMPHOCYTES # BLD AUTO: 0.67 X10*3/UL (ref 0.8–3)
LYMPHOCYTES NFR BLD AUTO: 6.2 %
MAGNESIUM SERPL-MCNC: 1.64 MG/DL (ref 1.6–2.4)
MCH RBC QN AUTO: 32.3 PG (ref 26–34)
MCHC RBC AUTO-ENTMCNC: 34.3 G/DL (ref 32–36)
MCV RBC AUTO: 94 FL (ref 80–100)
MONOCYTES # BLD AUTO: 0.53 X10*3/UL (ref 0.05–0.8)
MONOCYTES NFR BLD AUTO: 4.9 %
NEUTROPHILS # BLD AUTO: 9.5 X10*3/UL (ref 1.6–5.5)
NEUTROPHILS NFR BLD AUTO: 88.4 %
NITRITE UR QL STRIP.AUTO: NEGATIVE
NRBC BLD-RTO: 0 /100 WBCS (ref 0–0)
P AXIS: 69 DEGREES
P OFFSET: 153 MS
P ONSET: 93 MS
PH UR STRIP.AUTO: 6.5 [PH]
PHOSPHATE SERPL-MCNC: 3.1 MG/DL (ref 2.5–4.9)
PLATELET # BLD AUTO: 116 X10*3/UL (ref 150–450)
POTASSIUM SERPL-SCNC: 4.1 MMOL/L (ref 3.5–5.3)
PR INTERVAL: 232 MS
PROCALCITONIN SERPL-MCNC: 0.69 NG/ML
PROT UR STRIP.AUTO-MCNC: NEGATIVE MG/DL
Q ONSET: 209 MS
QRS COUNT: 12 BEATS
QRS DURATION: 138 MS
QT INTERVAL: 410 MS
QTC CALCULATION(BAZETT): 457 MS
QTC FREDERICIA: 441 MS
R AXIS: -57 DEGREES
RBC # BLD AUTO: 5.39 X10*6/UL (ref 4.5–5.9)
RBC # UR STRIP.AUTO: NEGATIVE MG/DL
RSV RNA RESP QL NAA+PROBE: NOT DETECTED
SODIUM SERPL-SCNC: 136 MMOL/L (ref 136–145)
SP GR UR STRIP.AUTO: >1.05
T AXIS: -34 DEGREES
T OFFSET: 414 MS
UROBILINOGEN UR STRIP.AUTO-MCNC: NORMAL MG/DL
VENTRICULAR RATE: 75 BPM
WBC # BLD AUTO: 10.8 X10*3/UL (ref 4.4–11.3)

## 2025-05-02 PROCEDURE — 2500000001 HC RX 250 WO HCPCS SELF ADMINISTERED DRUGS (ALT 637 FOR MEDICARE OP)

## 2025-05-02 PROCEDURE — 71275 CT ANGIOGRAPHY CHEST: CPT | Performed by: RADIOLOGY

## 2025-05-02 PROCEDURE — 87081 CULTURE SCREEN ONLY: CPT | Mod: GEALAB

## 2025-05-02 PROCEDURE — 96367 TX/PROPH/DG ADDL SEQ IV INF: CPT

## 2025-05-02 PROCEDURE — 84484 ASSAY OF TROPONIN QUANT: CPT

## 2025-05-02 PROCEDURE — 36415 COLL VENOUS BLD VENIPUNCTURE: CPT | Performed by: STUDENT IN AN ORGANIZED HEALTH CARE EDUCATION/TRAINING PROGRAM

## 2025-05-02 PROCEDURE — 2500000004 HC RX 250 GENERAL PHARMACY W/ HCPCS (ALT 636 FOR OP/ED): Mod: JZ | Performed by: STUDENT IN AN ORGANIZED HEALTH CARE EDUCATION/TRAINING PROGRAM

## 2025-05-02 PROCEDURE — 84145 PROCALCITONIN (PCT): CPT | Mod: GEALAB

## 2025-05-02 PROCEDURE — 2500000002 HC RX 250 W HCPCS SELF ADMINISTERED DRUGS (ALT 637 FOR MEDICARE OP, ALT 636 FOR OP/ED): Performed by: INTERNAL MEDICINE

## 2025-05-02 PROCEDURE — 83735 ASSAY OF MAGNESIUM: CPT

## 2025-05-02 PROCEDURE — 87486 CHLMYD PNEUM DNA AMP PROBE: CPT | Performed by: INTERNAL MEDICINE

## 2025-05-02 PROCEDURE — 99291 CRITICAL CARE FIRST HOUR: CPT | Performed by: STUDENT IN AN ORGANIZED HEALTH CARE EDUCATION/TRAINING PROGRAM

## 2025-05-02 PROCEDURE — 85025 COMPLETE CBC W/AUTO DIFF WBC: CPT

## 2025-05-02 PROCEDURE — 2500000005 HC RX 250 GENERAL PHARMACY W/O HCPCS: Performed by: INTERNAL MEDICINE

## 2025-05-02 PROCEDURE — 87040 BLOOD CULTURE FOR BACTERIA: CPT | Mod: GEALAB | Performed by: STUDENT IN AN ORGANIZED HEALTH CARE EDUCATION/TRAINING PROGRAM

## 2025-05-02 PROCEDURE — 99223 1ST HOSP IP/OBS HIGH 75: CPT

## 2025-05-02 PROCEDURE — 84100 ASSAY OF PHOSPHORUS: CPT

## 2025-05-02 PROCEDURE — 96365 THER/PROPH/DIAG IV INF INIT: CPT

## 2025-05-02 PROCEDURE — 93005 ELECTROCARDIOGRAM TRACING: CPT

## 2025-05-02 PROCEDURE — 2500000004 HC RX 250 GENERAL PHARMACY W/ HCPCS (ALT 636 FOR OP/ED)

## 2025-05-02 PROCEDURE — 94640 AIRWAY INHALATION TREATMENT: CPT

## 2025-05-02 PROCEDURE — 2500000002 HC RX 250 W HCPCS SELF ADMINISTERED DRUGS (ALT 637 FOR MEDICARE OP, ALT 636 FOR OP/ED)

## 2025-05-02 PROCEDURE — 87899 AGENT NOS ASSAY W/OPTIC: CPT | Mod: GEALAB

## 2025-05-02 PROCEDURE — 71275 CT ANGIOGRAPHY CHEST: CPT

## 2025-05-02 PROCEDURE — 94760 N-INVAS EAR/PLS OXIMETRY 1: CPT

## 2025-05-02 PROCEDURE — 87449 NOS EACH ORGANISM AG IA: CPT | Mod: GEALAB

## 2025-05-02 PROCEDURE — 81003 URINALYSIS AUTO W/O SCOPE: CPT | Performed by: STUDENT IN AN ORGANIZED HEALTH CARE EDUCATION/TRAINING PROGRAM

## 2025-05-02 PROCEDURE — 83605 ASSAY OF LACTIC ACID: CPT | Performed by: STUDENT IN AN ORGANIZED HEALTH CARE EDUCATION/TRAINING PROGRAM

## 2025-05-02 PROCEDURE — 1100000001 HC PRIVATE ROOM DAILY

## 2025-05-02 PROCEDURE — 2550000001 HC RX 255 CONTRASTS: Performed by: STUDENT IN AN ORGANIZED HEALTH CARE EDUCATION/TRAINING PROGRAM

## 2025-05-02 RX ORDER — IPRATROPIUM BROMIDE AND ALBUTEROL SULFATE 2.5; .5 MG/3ML; MG/3ML
3 SOLUTION RESPIRATORY (INHALATION) EVERY 2 HOUR PRN
Status: DISCONTINUED | OUTPATIENT
Start: 2025-05-02 | End: 2025-05-02

## 2025-05-02 RX ORDER — CEFTRIAXONE 1 G/50ML
1 INJECTION, SOLUTION INTRAVENOUS EVERY 24 HOURS
Status: DISCONTINUED | OUTPATIENT
Start: 2025-05-03 | End: 2025-05-04

## 2025-05-02 RX ORDER — ACETAMINOPHEN 500 MG
10 TABLET ORAL NIGHTLY
Status: DISCONTINUED | OUTPATIENT
Start: 2025-05-02 | End: 2025-05-04 | Stop reason: HOSPADM

## 2025-05-02 RX ORDER — CETIRIZINE HYDROCHLORIDE 10 MG/1
10 TABLET ORAL DAILY
Status: DISCONTINUED | OUTPATIENT
Start: 2025-05-02 | End: 2025-05-04 | Stop reason: HOSPADM

## 2025-05-02 RX ORDER — GUAIFENESIN 600 MG/1
600 TABLET, EXTENDED RELEASE ORAL 2 TIMES DAILY
Status: DISCONTINUED | OUTPATIENT
Start: 2025-05-02 | End: 2025-05-04 | Stop reason: HOSPADM

## 2025-05-02 RX ORDER — AZITHROMYCIN 500 MG/1
500 TABLET, FILM COATED ORAL
Status: COMPLETED | OUTPATIENT
Start: 2025-05-03 | End: 2025-05-04

## 2025-05-02 RX ORDER — CEFTRIAXONE 2 G/50ML
2 INJECTION, SOLUTION INTRAVENOUS ONCE
Status: COMPLETED | OUTPATIENT
Start: 2025-05-02 | End: 2025-05-02

## 2025-05-02 RX ORDER — IPRATROPIUM BROMIDE 21 UG/1
1 SPRAY, METERED NASAL 3 TIMES DAILY PRN
Status: DISCONTINUED | OUTPATIENT
Start: 2025-05-02 | End: 2025-05-04 | Stop reason: HOSPADM

## 2025-05-02 RX ORDER — GUAIFENESIN 600 MG/1
600 TABLET, EXTENDED RELEASE ORAL 2 TIMES DAILY PRN
Status: DISCONTINUED | OUTPATIENT
Start: 2025-05-02 | End: 2025-05-02

## 2025-05-02 RX ORDER — FLUTICASONE PROPIONATE 50 MCG
2 SPRAY, SUSPENSION (ML) NASAL 2 TIMES DAILY PRN
Status: DISCONTINUED | OUTPATIENT
Start: 2025-05-02 | End: 2025-05-04 | Stop reason: HOSPADM

## 2025-05-02 RX ORDER — METOPROLOL SUCCINATE 50 MG/1
100 TABLET, EXTENDED RELEASE ORAL DAILY
Status: DISCONTINUED | OUTPATIENT
Start: 2025-05-02 | End: 2025-05-04 | Stop reason: HOSPADM

## 2025-05-02 RX ORDER — TRAZODONE HYDROCHLORIDE 50 MG/1
25 TABLET ORAL NIGHTLY PRN
Status: DISCONTINUED | OUTPATIENT
Start: 2025-05-02 | End: 2025-05-04 | Stop reason: HOSPADM

## 2025-05-02 RX ORDER — METOPROLOL SUCCINATE 50 MG/1
50 TABLET, EXTENDED RELEASE ORAL NIGHTLY
Status: DISCONTINUED | OUTPATIENT
Start: 2025-05-02 | End: 2025-05-04 | Stop reason: HOSPADM

## 2025-05-02 RX ORDER — ROSUVASTATIN CALCIUM 20 MG/1
20 TABLET, COATED ORAL NIGHTLY
Status: DISCONTINUED | OUTPATIENT
Start: 2025-05-02 | End: 2025-05-04 | Stop reason: HOSPADM

## 2025-05-02 RX ORDER — IPRATROPIUM BROMIDE AND ALBUTEROL SULFATE 2.5; .5 MG/3ML; MG/3ML
3 SOLUTION RESPIRATORY (INHALATION)
Status: DISCONTINUED | OUTPATIENT
Start: 2025-05-02 | End: 2025-05-02

## 2025-05-02 RX ORDER — ACETAMINOPHEN 325 MG/1
650 TABLET ORAL EVERY 6 HOURS PRN
Status: DISCONTINUED | OUTPATIENT
Start: 2025-05-02 | End: 2025-05-03

## 2025-05-02 RX ORDER — BENZONATATE 100 MG/1
100 CAPSULE ORAL 3 TIMES DAILY PRN
Status: DISCONTINUED | OUTPATIENT
Start: 2025-05-02 | End: 2025-05-03

## 2025-05-02 RX ORDER — IPRATROPIUM BROMIDE AND ALBUTEROL SULFATE 2.5; .5 MG/3ML; MG/3ML
3 SOLUTION RESPIRATORY (INHALATION) EVERY 2 HOUR PRN
Status: DISCONTINUED | OUTPATIENT
Start: 2025-05-02 | End: 2025-05-04 | Stop reason: HOSPADM

## 2025-05-02 RX ORDER — IPRATROPIUM BROMIDE AND ALBUTEROL SULFATE 2.5; .5 MG/3ML; MG/3ML
3 SOLUTION RESPIRATORY (INHALATION) 3 TIMES DAILY
Status: DISCONTINUED | OUTPATIENT
Start: 2025-05-02 | End: 2025-05-04 | Stop reason: HOSPADM

## 2025-05-02 RX ADMIN — ACETAMINOPHEN 650 MG: 325 TABLET, FILM COATED ORAL at 08:26

## 2025-05-02 RX ADMIN — CEFTRIAXONE 2 G: 2 INJECTION, SOLUTION INTRAVENOUS at 01:09

## 2025-05-02 RX ADMIN — APIXABAN 5 MG: 5 TABLET, FILM COATED ORAL at 20:59

## 2025-05-02 RX ADMIN — Medication 10 MG: at 20:59

## 2025-05-02 RX ADMIN — IPRATROPIUM BROMIDE AND ALBUTEROL SULFATE 3 ML: 2.5; .5 SOLUTION RESPIRATORY (INHALATION) at 01:06

## 2025-05-02 RX ADMIN — IPRATROPIUM BROMIDE AND ALBUTEROL SULFATE 3 ML: 2.5; .5 SOLUTION RESPIRATORY (INHALATION) at 14:56

## 2025-05-02 RX ADMIN — METOPROLOL SUCCINATE 50 MG: 50 TABLET, EXTENDED RELEASE ORAL at 21:00

## 2025-05-02 RX ADMIN — Medication 2 L/MIN: at 09:00

## 2025-05-02 RX ADMIN — GUAIFENESIN 600 MG: 600 TABLET ORAL at 08:26

## 2025-05-02 RX ADMIN — METOPROLOL SUCCINATE 100 MG: 50 TABLET, EXTENDED RELEASE ORAL at 08:04

## 2025-05-02 RX ADMIN — TRAZODONE HYDROCHLORIDE 25 MG: 50 TABLET ORAL at 21:00

## 2025-05-02 RX ADMIN — ACETAMINOPHEN 650 MG: 325 TABLET, FILM COATED ORAL at 21:26

## 2025-05-02 RX ADMIN — APIXABAN 5 MG: 5 TABLET, FILM COATED ORAL at 08:04

## 2025-05-02 RX ADMIN — IOHEXOL 65 ML: 350 INJECTION, SOLUTION INTRAVENOUS at 00:54

## 2025-05-02 RX ADMIN — GUAIFENESIN 600 MG: 600 TABLET ORAL at 20:59

## 2025-05-02 RX ADMIN — CETIRIZINE HYDROCHLORIDE 10 MG: 10 TABLET, FILM COATED ORAL at 08:04

## 2025-05-02 RX ADMIN — IPRATROPIUM BROMIDE AND ALBUTEROL SULFATE 3 ML: 2.5; .5 SOLUTION RESPIRATORY (INHALATION) at 08:06

## 2025-05-02 RX ADMIN — Medication 1 L/MIN: at 20:57

## 2025-05-02 RX ADMIN — AZITHROMYCIN MONOHYDRATE 500 MG: 500 INJECTION, POWDER, LYOPHILIZED, FOR SOLUTION INTRAVENOUS at 01:53

## 2025-05-02 RX ADMIN — IPRATROPIUM BROMIDE AND ALBUTEROL SULFATE 3 ML: 2.5; .5 SOLUTION RESPIRATORY (INHALATION) at 20:52

## 2025-05-02 RX ADMIN — Medication 2 L/MIN: at 14:56

## 2025-05-02 SDOH — SOCIAL STABILITY: SOCIAL INSECURITY: WITHIN THE LAST YEAR, HAVE YOU BEEN AFRAID OF YOUR PARTNER OR EX-PARTNER?: NO

## 2025-05-02 SDOH — HEALTH STABILITY: MENTAL HEALTH: HOW OFTEN DO YOU HAVE SIX OR MORE DRINKS ON ONE OCCASION?: NEVER

## 2025-05-02 SDOH — SOCIAL STABILITY: SOCIAL INSECURITY: HAVE YOU HAD THOUGHTS OF HARMING ANYONE ELSE?: NO

## 2025-05-02 SDOH — SOCIAL STABILITY: SOCIAL INSECURITY: ABUSE: ADULT

## 2025-05-02 SDOH — HEALTH STABILITY: MENTAL HEALTH: HOW OFTEN DO YOU HAVE A DRINK CONTAINING ALCOHOL?: 4 OR MORE TIMES A WEEK

## 2025-05-02 SDOH — SOCIAL STABILITY: SOCIAL INSECURITY: HAS ANYONE EVER THREATENED TO HURT YOUR FAMILY OR YOUR PETS?: NO

## 2025-05-02 SDOH — SOCIAL STABILITY: SOCIAL INSECURITY: HAVE YOU HAD ANY THOUGHTS OF HARMING ANYONE ELSE?: NO

## 2025-05-02 SDOH — ECONOMIC STABILITY: FOOD INSECURITY: WITHIN THE PAST 12 MONTHS, THE FOOD YOU BOUGHT JUST DIDN'T LAST AND YOU DIDN'T HAVE MONEY TO GET MORE.: NEVER TRUE

## 2025-05-02 SDOH — ECONOMIC STABILITY: FOOD INSECURITY: WITHIN THE PAST 12 MONTHS, YOU WORRIED THAT YOUR FOOD WOULD RUN OUT BEFORE YOU GOT THE MONEY TO BUY MORE.: NEVER TRUE

## 2025-05-02 SDOH — SOCIAL STABILITY: SOCIAL INSECURITY: DOES ANYONE TRY TO KEEP YOU FROM HAVING/CONTACTING OTHER FRIENDS OR DOING THINGS OUTSIDE YOUR HOME?: NO

## 2025-05-02 SDOH — SOCIAL STABILITY: SOCIAL INSECURITY: ARE THERE ANY APPARENT SIGNS OF INJURIES/BEHAVIORS THAT COULD BE RELATED TO ABUSE/NEGLECT?: NO

## 2025-05-02 SDOH — ECONOMIC STABILITY: INCOME INSECURITY: IN THE PAST 12 MONTHS HAS THE ELECTRIC, GAS, OIL, OR WATER COMPANY THREATENED TO SHUT OFF SERVICES IN YOUR HOME?: NO

## 2025-05-02 SDOH — HEALTH STABILITY: MENTAL HEALTH: HOW MANY DRINKS CONTAINING ALCOHOL DO YOU HAVE ON A TYPICAL DAY WHEN YOU ARE DRINKING?: 1 OR 2

## 2025-05-02 SDOH — SOCIAL STABILITY: SOCIAL INSECURITY: WITHIN THE LAST YEAR, HAVE YOU BEEN HUMILIATED OR EMOTIONALLY ABUSED IN OTHER WAYS BY YOUR PARTNER OR EX-PARTNER?: NO

## 2025-05-02 SDOH — SOCIAL STABILITY: SOCIAL INSECURITY: DO YOU FEEL ANYONE HAS EXPLOITED OR TAKEN ADVANTAGE OF YOU FINANCIALLY OR OF YOUR PERSONAL PROPERTY?: NO

## 2025-05-02 SDOH — SOCIAL STABILITY: SOCIAL INSECURITY: ARE YOU OR HAVE YOU BEEN THREATENED OR ABUSED PHYSICALLY, EMOTIONALLY, OR SEXUALLY BY ANYONE?: NO

## 2025-05-02 SDOH — SOCIAL STABILITY: SOCIAL INSECURITY: WERE YOU ABLE TO COMPLETE ALL THE BEHAVIORAL HEALTH SCREENINGS?: YES

## 2025-05-02 SDOH — SOCIAL STABILITY: SOCIAL INSECURITY: DO YOU FEEL UNSAFE GOING BACK TO THE PLACE WHERE YOU ARE LIVING?: NO

## 2025-05-02 ASSESSMENT — COGNITIVE AND FUNCTIONAL STATUS - GENERAL
WALKING IN HOSPITAL ROOM: A LOT
MOVING FROM LYING ON BACK TO SITTING ON SIDE OF FLAT BED WITH BEDRAILS: A LITTLE
DAILY ACTIVITIY SCORE: 20
MOBILITY SCORE: 13
MOVING TO AND FROM BED TO CHAIR: A LITTLE
MOVING FROM LYING ON BACK TO SITTING ON SIDE OF FLAT BED WITH BEDRAILS: A LITTLE
TURNING FROM BACK TO SIDE WHILE IN FLAT BAD: A LITTLE
TOILETING: A LITTLE
CLIMB 3 TO 5 STEPS WITH RAILING: A LITTLE
DRESSING REGULAR LOWER BODY CLOTHING: A LITTLE
MOBILITY SCORE: 13
STANDING UP FROM CHAIR USING ARMS: A LOT
WALKING IN HOSPITAL ROOM: A LITTLE
DAILY ACTIVITIY SCORE: 20
DRESSING REGULAR UPPER BODY CLOTHING: A LITTLE
WALKING IN HOSPITAL ROOM: A LOT
HELP NEEDED FOR BATHING: A LITTLE
PATIENT BASELINE BEDBOUND: NO
TOILETING: A LITTLE
MOVING FROM LYING ON BACK TO SITTING ON SIDE OF FLAT BED WITH BEDRAILS: A LITTLE
STANDING UP FROM CHAIR USING ARMS: A LOT
DRESSING REGULAR UPPER BODY CLOTHING: A LITTLE
DRESSING REGULAR LOWER BODY CLOTHING: A LITTLE
DRESSING REGULAR LOWER BODY CLOTHING: A LITTLE
HELP NEEDED FOR BATHING: A LITTLE
MOVING TO AND FROM BED TO CHAIR: A LOT
TURNING FROM BACK TO SIDE WHILE IN FLAT BAD: A LITTLE
DRESSING REGULAR UPPER BODY CLOTHING: A LITTLE
STANDING UP FROM CHAIR USING ARMS: A LITTLE
CLIMB 3 TO 5 STEPS WITH RAILING: TOTAL
DAILY ACTIVITIY SCORE: 20
MOVING TO AND FROM BED TO CHAIR: A LOT
HELP NEEDED FOR BATHING: A LITTLE
PATIENT BASELINE BEDBOUND: NO
TURNING FROM BACK TO SIDE WHILE IN FLAT BAD: A LITTLE
CLIMB 3 TO 5 STEPS WITH RAILING: TOTAL
TOILETING: A LITTLE
MOBILITY SCORE: 18

## 2025-05-02 ASSESSMENT — LIFESTYLE VARIABLES
SKIP TO QUESTIONS 9-10: 1
AUDIT-C TOTAL SCORE: 4

## 2025-05-02 ASSESSMENT — PAIN SCALES - GENERAL
PAINLEVEL_OUTOF10: 0 - NO PAIN
PAINLEVEL_OUTOF10: 2
PAINLEVEL_OUTOF10: 0 - NO PAIN
PAINLEVEL_OUTOF10: 0 - NO PAIN
PAINLEVEL_OUTOF10: 3

## 2025-05-02 ASSESSMENT — ACTIVITIES OF DAILY LIVING (ADL)
HEARING - RIGHT EAR: FUNCTIONAL
WALKS IN HOME: INDEPENDENT
DRESSING YOURSELF: INDEPENDENT
BATHING: INDEPENDENT
ADEQUATE_TO_COMPLETE_ADL: YES
LACK_OF_TRANSPORTATION: NO
JUDGMENT_ADEQUATE_SAFELY_COMPLETE_DAILY_ACTIVITIES: YES
HEARING - LEFT EAR: FUNCTIONAL
GROOMING: INDEPENDENT
ASSISTIVE_DEVICE: EYEGLASSES;WALKER
TOILETING: INDEPENDENT
PATIENT'S MEMORY ADEQUATE TO SAFELY COMPLETE DAILY ACTIVITIES?: YES
FEEDING YOURSELF: INDEPENDENT

## 2025-05-02 ASSESSMENT — PAIN DESCRIPTION - LOCATION
LOCATION: HEAD
LOCATION: EAR

## 2025-05-02 ASSESSMENT — PAIN DESCRIPTION - DESCRIPTORS: DESCRIPTORS: ACHING

## 2025-05-02 ASSESSMENT — PATIENT HEALTH QUESTIONNAIRE - PHQ9
1. LITTLE INTEREST OR PLEASURE IN DOING THINGS: SEVERAL DAYS
2. FEELING DOWN, DEPRESSED OR HOPELESS: NOT AT ALL
SUM OF ALL RESPONSES TO PHQ9 QUESTIONS 1 & 2: 1

## 2025-05-02 ASSESSMENT — PAIN DESCRIPTION - ORIENTATION: ORIENTATION: RIGHT

## 2025-05-02 ASSESSMENT — PAIN - FUNCTIONAL ASSESSMENT
PAIN_FUNCTIONAL_ASSESSMENT: 0-10

## 2025-05-02 NOTE — ED PROVIDER NOTES
Limitations to history: None  Independent Historians: Family  External Records Reviewed: HIE, OARRS, outpatient notes, inpatient notes, paper charts if needed    History of Present Illness:  Patient is a 84-year-old male with a past medical history positive for hypertension, CVA, CABG, anxiety, epigastric pain presents to ED chief complaint of weakness, fever, cough.  Patient arrives to ED via EMS.  Patient reports that he went to his primary care provider's office earlier and was advised that he has bronchitis.  Patient reports his symptoms have become worse and now he feels like he has a fever.  Patient reports generalized weakness and a worsening cough.  Reports that he was recently in [place] last week.  Reports he takes blood thinners, Eliquis for the treatment of A-fib and CVA.  Patient reports his symptoms all started on Monday.  Patient is alert and oriented x 3 upon examination, present to ED with family.      Denies HA, C/P, SOB, ABD pain, Nausea, Vomiting, Diarrhea, Dizziness,     PMFSH:   As per HPI, otherwise nurses notes reviewed in EMR    Physical Exam:  Appearance: Alert, oriented x3, supine on exam table with head elevated, cooperative, in no acute distress. Well nourished & well hydrated.      Skin: Intact, dry skin, no lesions, rash, petechiae or purpura.     Eyes: PERRLA, EOMs intact, Conjunctiva pink with no redness or exudates. No scleral icterus.     Ears: Hearing grossly intact.      Nose: Nares patent, no epistaxis.     Mouth: Dentition without concerning abnormalities. no obstruction of posterior pharynx.     Neck: Supple, without meningismus. Trachea at midline.     Pulmonary: Clear bilaterally with good chest wall excursion. No rales, rhonchi or wheezing. No accessory muscle use or stridor. Talking in full sentences.     Cardiac: Normal S1, S2 without murmur, rub, gallop or extrasystole.     Abdomen: Soft, nontender to light and deep palpation to all quadrants, normoactive bowel sounds.   No palpable organomegaly.  No rebound or guarding.     Genitourinary: Physical exam deferred.     Musculoskeletal: Normal gait. Full range of motion to all extremities. Rest of the exam reveals no pain on palpation, instability, or deformity. Pulses full and equal. No cyanosis or clubbing. capillary refill <2 seconds to all examined digits.     Neurological:  Cranial nerves II through XII are grossly intact, normal sensation, no weakness, no focal findings identified.      Psychiatric: Appropriate mood and affect.    EKG interpreted by me shows   Ventricular rate of 84  bpm  MD interval    234            ms  QTc    384/453                        ms  No T wave elevation or depression    Labs Reviewed   CBC WITH AUTO DIFFERENTIAL - Abnormal       Result Value    WBC 11.1      nRBC 0.0      RBC 5.71      Hemoglobin 18.4 (*)     Hematocrit 52.1 (*)     MCV 91      MCH 32.2      MCHC 35.3      RDW 12.8      Platelets 129 (*)     Neutrophils % 87.1      Immature Granulocytes %, Automated 0.4      Lymphocytes % 5.8      Monocytes % 5.4      Eosinophils % 0.9      Basophils % 0.4      Neutrophils Absolute 9.68 (*)     Immature Granulocytes Absolute, Automated 0.04      Lymphocytes Absolute 0.65 (*)     Monocytes Absolute 0.60      Eosinophils Absolute 0.10      Basophils Absolute 0.05     COMPREHENSIVE METABOLIC PANEL - Abnormal    Glucose 103 (*)     Sodium 136      Potassium 3.8      Chloride 102      Bicarbonate 26      Anion Gap 12      Urea Nitrogen 11      Creatinine 0.82      eGFR 87      Calcium 8.7      Albumin 4.3      Alkaline Phosphatase 46      Total Protein 6.6      AST 18      Bilirubin, Total 1.0      ALT 23     COAGULATION SCREEN - Abnormal    Protime 16.0 (*)     INR 1.4 (*)     aPTT 32      Narrative:     The APTT is no longer used for monitoring Unfractionated Heparin Therapy. For monitoring Heparin Therapy, use the Heparin Assay.   B-TYPE NATRIURETIC PEPTIDE - Abnormal     (*)     Narrative:         <100 pg/mL - Heart failure unlikely  100-299 pg/mL - Intermediate probability of acute heart                  failure exacerbation. Correlate with clinical                  context and patient history.    >=300 pg/mL - Heart Failure likely. Correlate with clinical                  context and patient history.    BNP testing is performed using different testing methodology at [facility] than at other [facility]. Direct result comparisons should only be made within the same method.      SERIAL TROPONIN, 1 HOUR - Abnormal    Troponin I, High Sensitivity 24 (*)     Narrative:     Less than 99th percentile of normal range cutoff-  Female and children under 18 years old <14 ng/L; Male <21 ng/L: Negative  Repeat testing should be performed if clinically indicated.     Female and children under 18 years old 14-50 ng/L; Male 21-50 ng/L:  Consistent with possible cardiac damage and possible increased clinical   risk. Serial measurements may help to assess extent of myocardial damage.     >50 ng/L: Consistent with cardiac damage, increased clinical risk and  myocardial infarction. Serial measurements may help assess extent of   myocardial damage.      NOTE: Children less than 1 year old may have higher baseline troponin   levels and results should be interpreted in conjunction with the overall   clinical context.     NOTE: Troponin I testing is performed using a different   testing methodology at [facility] than at other   [facility]. Direct result comparisons should only   be made within the same method.   MAGNESIUM - Normal    Magnesium 1.69     SARS-COV-2 PCR - Normal    Coronavirus 2019, PCR Not Detected      Narrative:     This assay is an FDA-cleared, in vitro diagnostic nucleic acid amplification test for the qualitative detection and differentiation of SARS CoV-2 from nasopharyngeal specimens collected from individuals with signs and symptoms of respiratory tract infections,  and has been validated for use at [facility]. Negative results do not preclude COVID-19 infections and should not be used as the sole basis for diagnosis, treatment, or other management decisions. Testing for SARS CoV-2 is recommended only for patients who meet current clinical and/or epidemiological criteria defined by federal, state, or local public health directives.   SERIAL TROPONIN-INITIAL - Normal    Troponin I, High Sensitivity 16      Narrative:     Less than 99th percentile of normal range cutoff-  Female and children under 18 years old <14 ng/L; Male <21 ng/L: Negative  Repeat testing should be performed if clinically indicated.     Female and children under 18 years old 14-50 ng/L; Male 21-50 ng/L:  Consistent with possible cardiac damage and possible increased clinical   risk. Serial measurements may help to assess extent of myocardial damage.     >50 ng/L: Consistent with cardiac damage, increased clinical risk and  myocardial infarction. Serial measurements may help assess extent of   myocardial damage.      NOTE: Children less than 1 year old may have higher baseline troponin   levels and results should be interpreted in conjunction with the overall   clinical context.     NOTE: Troponin I testing is performed using a different   testing methodology at [facility] than at [facility]. Direct result comparisons should only   be made within the same method.   TYPE AND SCREEN    ABO TYPE A      Rh TYPE POS      ANTIBODY SCREEN NEG     TROPONIN SERIES- (INITIAL, 1 HR)    Narrative:     The following orders were created for panel order Troponin I Series, High Sensitivity (0, 1 HR).  Procedure                               Abnormality         Status                     ---------                               -----------         ------                     Troponin I, High Sensiti...[[id number]]  Normal              Final result               Troponin, High  Sensitivi...[[id number]]  Abnormal            Final result                 Please view results for these tests on the individual orders.      XR chest 1 view   Final Result   No acute cardiopulmonary disease.   Signed by Fransisco Thomson DO                     Repeat Evaluation below    Summary:  Medical Decision Making:   Patient presented as described in HPI. Patient case including ROS, PE, and treatment and plan discussed with ED attending if attached as cosigner. Due to patients presentation orders completed include as documented.  Patient evaluated for complaints of weakness, possible fever, cough.  Patient reports that he traveled to [place] last week.  Patient is present to ED with family, via EMS.  Patient was found to be febrile, nonhypoxic, nontachycardic.  Patient reports his symptoms started on Monday, have become progressively worse.  Reports that his doctor told him today that he has bronchitis.  Upon examination patient has a productive cough, patient denies any purulent mucus.  Troponin level 16, elevated 24, BNP elevated 300, serum CMP revealed no electrolyte abnormalities, magnesium 1.69, serum CBC revealed no evidence of leukocytosis present viral swabs negative.  Chest x-ray revealed no acute cardiopulmonary disease.  Findings discussed with assuming ED provider, Dr. Hodgson.      ED Course as of 05/02/25 0039   Fri May 02, 2025   0018 Temperature(!): 39.4 °C (102.9 °F) [WL]   0019 Troponin I, High Sensitivity(!): 24 [WL]   0019 Troponin I, High Sensitivity: 16 [WL]   0019 BNP(!): 300 [WL]   0019 XR chest 1 view  No acute cardiopulmonary disease. [WL]   0033 Pt came into the ER by squad due to he has been feeling weak and feverish.  He just came back from a week in [facility]- he went to his PCP today and was told he has Bronchiti [WL]      ED Course User Index  [WL] Christofer Hodgson DO         Diagnoses as of 05/02/25 0039   Fever in other diseases   Acute cough   Congestive heart failure, unspecified  HF chronicity, unspecified heart failure type   Generalized weakness        Tests/Medications/Escalations of Care considered but not given:    Patient care discussed with: N/A  Social Determinants affecting care: N/A    Final diagnosis and disposition as documented in impression       Disposition:  admit     Hospitalize to _ floor under  _ per their orders. Discussed findings and treatment done here in ED with admitting physician. It would be a risk to discharge the patient in their condition due to possibility of deterioration in their condition and the need for urgent interventions.    This note has been transcribed using voice recognition and may contain grammatical errors, misplaced words, incorrect words, incorrect phrases or other errors.     Katharine Bower, NUNO-CNP  05/02/25 0039

## 2025-05-02 NOTE — ED NOTES
Pt came into the ER by squad due to he has been feeling weak and feverish.  He just came back from a week in [facility]- he went to his PCP today and was told he has Bronchitis.       Megan Pacheco RN  05/01/25 2049

## 2025-05-02 NOTE — H&P
"  HPI    Lv Quinteros is a 84 y.o. male with a PMH of HTN, HLD, basal cell skin carcinoma, CAD s/p CABG x1 in 1997, CVA w/ residual left sided weakness, A-fib on Eliquis, anxiety, who presented to the hospital for fever and worsening weakness. Patient was recently on a trip to [place] and came back on Monday. He did endorse a mild cough over there, however nothing severe. Since returning, he's been having a worsening cough where he feels like he needs to cough up mucus but cannot, and has endorsed increased SOB on exertion. He's been progressively getting weaker as well. His girlfriend went to move him to a chair, however he felt his legs give out and slipped on the floor, prompting his girlfriend to call 911. He endorsed subjective fevers at home, their thermometer showed a normal temp at home but he had a fever in the ER. He denies chest pain, but does endorse congestion and a sore throat that is improving. Appetite is poor at baseline.    ROS: 12 points review of system is negative except as stated in the HPI above.     ED Course   Vitals - /77   Pulse 83   Temp 37.6 °C (99.7 °F) (Temporal)   Resp 18   Wt 74.2 kg (163 lb 9.6 oz)   SpO2 94%     Lab Results   Component Value Date    WBC 11.1 05/01/2025    HGB 18.4 (H) 05/01/2025    HCT 52.1 (H) 05/01/2025    MCV 91 05/01/2025     (L) 05/01/2025     Lab Results   Component Value Date    GLUCOSE 103 (H) 05/01/2025    CALCIUM 8.7 05/01/2025     05/01/2025    K 3.8 05/01/2025    CO2 26 05/01/2025     05/01/2025    BUN 11 05/01/2025    CREATININE 0.82 05/01/2025     Lab Results   Component Value Date    TROPHS 24 (H) 05/01/2025     Lab Results   Component Value Date     (H) 05/01/2025   No results found for: \"DDIMERVTE\"    Lactate: 2.6 --> 1.5  Troponin: 16 --> 24    Imaging  CT angio chest for pulmonary embolism   Final Result   No evidence of acute pulmonary embolism.        Mild groundglass opacities in the left greater than " right lower lungs   concerning for mild pneumonia/pneumonitis.        MACRO:   None        Signed by: Hung Figueroa 2025 1:14 AM   Dictation workstation:   [id number]      XR chest 1 view   Final Result   No acute cardiopulmonary disease.   Signed by Fransisco Thomson DO           Micro:   - Blood culture: x2 in ED  - UA: negative    ED Interventions:   - Ceftriaxone, azithromycin, Tylenol 975 mg daily, 500 mL NS bolus, DuoNeb      Past Medical History   He has a past medical history of Actinic keratosis (2023), Ankylosing spondylitis, Arrhythmia, Arthritis, Cholelithiasis without obstruction (2020), Coronary artery disease, Cough (10/04/2023), COVID-19 (10/04/2023), Erythema intertrigo (2023), High prostate specific antigen (PSA) (2019), Hypertension, Inflamed seborrheic keratosis (2023), Melanocytic nevi of unspecified upper limb, including shoulder (2023), Sore throat (10/04/2023), and Stroke (Multi).  Surgical History   Surgical History[1]  Family History   Family History[2]  Social History     Social History     Socioeconomic History    Marital status:      Spouse name: Not on file    Number of children: Not on file    Years of education: Not on file    Highest education level: Not on file   Occupational History    Not on file   Tobacco Use    Smoking status: Former     Current packs/day: 0.00     Types: Cigarettes     Quit date:      Years since quittin.3    Smokeless tobacco: Never   Vaping Use    Vaping status: Never Used   Substance and Sexual Activity    Alcohol use: Yes     Alcohol/week: 7.0 standard drinks of alcohol     Types: 7 Cans of beer per week     Comment: 1-2 cocktails daily    Drug use: Never    Sexual activity: Not on file   Other Topics Concern    Not on file   Social History Narrative    Not on file     Social Drivers of Health     Financial Resource Strain: Low Risk  (2025)    Overall Financial Resource Strain (CARDIA)      "Difficulty of Paying Living Expenses: Not hard at all   Food Insecurity: No Food Insecurity (5/2/2025)    Hunger Vital Sign     Worried About Running Out of Food in the Last Year: Never true     Ran Out of Food in the Last Year: Never true   Transportation Needs: No Transportation Needs (5/2/2025)    PRAPARE - Transportation     Lack of Transportation (Medical): No     Lack of Transportation (Non-Medical): No   Physical Activity: Not on file   Stress: Not on file   Social Connections: Not on file   Intimate Partner Violence: Not At Risk (5/2/2025)    Humiliation, Afraid, Rape, and Kick questionnaire     Fear of Current or Ex-Partner: No     Emotionally Abused: No     Physically Abused: No     Sexually Abused: No   Housing Stability: Low Risk  (5/2/2025)    Housing Stability Vital Sign     Unable to Pay for Housing in the Last Year: No     Number of Times Moved in the Last Year: 0     Homeless in the Last Year: No       Tobacco Use: Medium Risk (5/1/2025)    Patient History     Smoking Tobacco Use: Former     Smokeless Tobacco Use: Never     Passive Exposure: Not on file        Social History     Substance and Sexual Activity   Alcohol Use Yes    Alcohol/week: 7.0 standard drinks of alcohol    Types: 7 Cans of beer per week    Comment: 1-2 cocktails daily      Allergies   RX Allergies[3]     Objective     Vitals  Visit Vitals  /77   Pulse 83   Temp 37.6 °C (99.7 °F) (Temporal)   Resp 18   Ht 1.778 m (5' 10\")   Wt 74.2 kg (163 lb 9.6 oz)   SpO2 94%   BMI 23.47 kg/m²   Smoking Status Former   BSA 1.91 m²        Physical Examination:  Physical Exam  Vitals reviewed.   Constitutional:       General: He is not in acute distress.     Comments: Tired appearing   Cardiovascular:      Rate and Rhythm: Normal rate and regular rhythm.      Heart sounds: Normal heart sounds. No murmur heard.  Pulmonary:      Effort: No respiratory distress.      Breath sounds: Normal breath sounds. No wheezing.   Abdominal:      General: " "There is no distension.      Palpations: Abdomen is soft.      Tenderness: There is no abdominal tenderness.   Musculoskeletal:      Right lower leg: Edema present.      Left lower leg: Edema present.      Comments: Trace pitting edema in bilateral LE   Neurological:      Mental Status: He is alert and oriented to person, place, and time. Mental status is at baseline.           I/Os  No intake or output data in the 24 hours ending 05/02/25 0418    Labs:   Results from last 72 hours   Lab Units 05/01/25 2020   SODIUM mmol/L 136   POTASSIUM mmol/L 3.8   CHLORIDE mmol/L 102   CO2 mmol/L 26   BUN mg/dL 11   CREATININE mg/dL 0.82   GLUCOSE mg/dL 103*   CALCIUM mg/dL 8.7   ANION GAP mmol/L 12   EGFR mL/min/1.73m*2 87      Results from last 72 hours   Lab Units 05/01/25 2020   WBC AUTO x10*3/uL 11.1   HEMOGLOBIN g/dL 18.4*   HEMATOCRIT % 52.1*   PLATELETS AUTO x10*3/uL 129*   NEUTROS PCT AUTO % 87.1   LYMPHS PCT AUTO % 5.8   MONOS PCT AUTO % 5.4   EOS PCT AUTO % 0.9      Lab Results   Component Value Date    CALCIUM 8.7 05/01/2025    PHOS 3.9 03/17/2021      No results found for: \"CRP\"   [unfilled]   Lab Results   Component Value Date    TROPONINI 0.04 (H) 03/12/2021         Micro/ID:   No results found for the last 90 days.                   No lab exists for component: \"AGALPCRNB\"   .ID  Lab Results   Component Value Date    URINECULTURE No growth 04/14/2024     CT angio chest for pulmonary embolism  Result Date: 5/2/2025  Interpreted By:  Hung Figueroa, STUDY: CT ANGIO CHEST FOR PULMONARY EMBOLISM;  5/2/2025 12:52 am   INDICATION: Signs/Symptoms:Concern for PE.   COMPARISON: None.   ACCESSION NUMBER(S): [id number]   ORDERING CLINICIAN: KAR VELAZQUEZ   TECHNIQUE: Contiguous axial images of the chest were obtained after the intravenous administration of  contrast. Coronal and sagittal reformatted images were obtained from the axial images. MIPS of the chest were also performed and reviewed.   FINDINGS: No axillary " lymphadenopathy. 1.5 cm AP window lymph node. 1.5 cm subcarinal lymph node. No hilar lymphadenopathy. There is cardiomegaly. Coronary artery atherosclerotic calcifications. No significant pericardial effusion.   No evidence of acute central, main, lobar, or proximal segmental pulmonary embolism. Evaluation for more distal emboli is limited secondary to respiratory motion.   Evaluation of the lungs is limited secondary to patient motion. Mild groundglass opacities in left greater right lower lobe compatible with pneumonia. No significant pleural effusion. No pneumothorax.   Small hiatal hernia   Limited evaluation of the upper abdomen. Cholelithiasis.   Multiple degenerative change of the thoracic spine.       No evidence of acute pulmonary embolism.   Mild groundglass opacities in the left greater than right lower lungs concerning for mild pneumonia/pneumonitis.   MACRO: None   Signed by: Hung Figueroa 5/2/2025 1:14 AM Dictation workstation:   [id number]    XR chest 1 view  Result Date: 5/1/2025  STUDY: Chest Radiograph;  5/1/2025 10:38 PM INDICATION: Cough, fever. COMPARISON: None Available. ACCESSION NUMBER(S): [id number] ORDERING CLINICIAN: LETY HERNANDEZ TECHNIQUE:  Frontal chest was obtained at 22:10 hours. FINDINGS: CARDIOMEDIASTINAL SILHOUETTE: Heart size is top normal.  There is prominence of the aortic arch and tortuosity of the descending aorta.  LUNGS: Lungs are clear.  There is no pneumothorax or pleural effusion.  ABDOMEN: No remarkable upper abdominal findings.  BONES: No acute osseous changes.    No acute cardiopulmonary disease. Signed by Fransisco Thomson, DO      Assessment and Plan      Lv Quinteros is a 84 y.o. male with a PMH of HTN, HLD, basal cell skin carcinoma, CAD s/p CABG x1 in 1997, CVA w/ residual left sided weakness, A-fib on Eliquis, anxiety admitted for acute hypoxic respiratory failure 2/2 CAP and generalized weakness.    Acute Medical Issues   #Community acquired pneumonia / #  Pneumonia due to gram negative bacteria  #Generalized weakness  - Patient presented to the ED with a 5 day history of SOB on exertion, cough, and subjective fever today with worsening generalized weakness  - Flu, COVID, RSV negative  - CT angio chest negative for PE but showed mild ground glass opacities in L>R lower lungs concerning for pneumonia/pneumonitis   - S/p ceftriaxone, azithro, 500 mL NS bolus, and DuoNeb in ED  - Continue ceftriaxone and azithromycin (5/2 - )  - Scheduled DuoNebs TID  - Tylenol and Mucinex PRN for supportive care  - Procalcitonin, strep and legionella, MRSA nares, sputum culture for further pneumonia workup  - On 2L O2, wean as tolerated  - PT/OT to see    #Elevated troponin  - Likely in setting of pneumonia  - Trending from 16 to 24, will continue to trend until downtrending  - Patient actively denying signs of ACS at this time    Chronic Medical Issues   #HTN  #A-fib  - Continue metoprolol succinate 100 mg in AM and 50 mg in PM    #HLD  #CVA  - Continue rosuvastatin 20 mg nightly    #Allergies  - Continue Flonase and ipratropium PRN nasal sprays  - Continue Zyrtec 10 mg daily    F: PO intake & IVF PRN   E: Replete PRN  N: Regular diet  GI ppx: None  DVT ppx: Eliquis    Antibiotics: Ceftriaxone and azithromycin  O2: 2L  Tubes/Lines/Drains: PIVs    Code Status: DNR   Emergency Contact: Extended Emergency Contact Information  Primary Emergency Contact: Ciarra Quinteros  Home Phone: [phone]  Work Phone: [phone]  Relation: Friend  Secondary Emergency Contact: Pita Wright  Home Phone: [phone]  Relation: Friend     Disposition: 84 y.o.male admitted for AHRF and generalized weakness 2/2 CAP. Estimated LOS > 48 hours.    Joe Singh MD  PGY-2 IM resident         [1]   Past Surgical History:  Procedure Laterality Date    CATARACT EXTRACTION      COLONOSCOPY      CORONARY ARTERY BYPASS GRAFT      x1  20 yrs ago at [facility]    TONSILLECTOMY     [2]   Family History  Problem Relation Name  Age of Onset    Heart disease Mother      Heart disease Father      Allergies Parent      Heart disease Parent     [3]   Allergies  Allergen Reactions    Penicillins Hives, Rash, Shortness of breath and Unknown    Other Other     flushing, near-syncope    Statins-Hmg-Coa Reductase Inhibitors Unknown     muscle weakness on Lipitor; crestor ok

## 2025-05-02 NOTE — PROGRESS NOTES
"Subjective   Subjective:   History Of Present Illness:  Lv Quinteros is a 84 y.o. male was seen and evaluated at bedside today. Overnight, patient was admitted for management of pneumonia.  This morning patient states he feels better than yesterday. States he has a cough and feels congested but is unable to bring anything up when he coughs. States he did not sleep well last night and would like a sleeping pill.        Objective   Objective:     /67 (BP Location: Left arm, Patient Position: Lying)   Pulse 79   Temp 36.4 °C (97.5 °F) (Temporal)   Resp 24   Ht 1.778 m (5' 10\")   Wt 74.2 kg (163 lb 9.6 oz)   SpO2 90%   BMI 23.47 kg/m²     Physical Exam  Constitutional:       Appearance: Normal appearance.   HENT:      Head: Normocephalic and atraumatic.      Nose: Nose normal.      Mouth/Throat:      Mouth: Mucous membranes are moist.   Eyes:      Conjunctiva/sclera: Conjunctivae normal.   Cardiovascular:      Rate and Rhythm: Normal rate and regular rhythm.   Pulmonary:      Effort: Pulmonary effort is normal.   Skin:     General: Skin is warm and dry.   Neurological:      Mental Status: He is alert.      Comments: oriented   Psychiatric:         Mood and Affect: Mood normal.         Behavior: Behavior normal.         Thought Content: Thought content normal.       Lab Work:     Lab Results   Component Value Date    WBC 10.8 05/02/2025    HGB 17.4 05/02/2025    HCT 50.7 05/02/2025    MCV 94 05/02/2025     (L) 05/02/2025     Lab Results   Component Value Date    GLUCOSE 95 05/02/2025    CALCIUM 8.5 (L) 05/02/2025     05/02/2025    K 4.1 05/02/2025    CO2 27 05/02/2025     05/02/2025    BUN 11 05/02/2025    CREATININE 0.88 05/02/2025     Hemoglobin A1C   Date Value Ref Range Status   11/20/2024 5.2 See comment % Final   10/17/2023 5.1 see below % Final   05/31/2022 5.0 4.3 - 5.6 % Final     Comment:     American Diabetes Association guidelines indicate that patients with HgbA1c in the " range 5.7-6.4% are at increased risk for development of diabetes, and intervention by lifestyle modification may be beneficial. HgbA1c greater or equal to 6.5% is considered diagnostic of diabetes.     Thyroid Stimulating Hormone   Date Value Ref Range Status   11/19/2024 1.35 0.44 - 3.98 mIU/L Final   11/19/2024 1.47 0.44 - 3.98 mIU/L Final   10/17/2023 1.39 0.44 - 3.98 mIU/L Final     Cultures:   No results found for the last 90 days.    Images:     CT angio chest for pulmonary embolism   Final Result   No evidence of acute pulmonary embolism.        Mild groundglass opacities in the left greater than right lower lungs   concerning for mild pneumonia/pneumonitis.        MACRO:   None        Signed by: Hung Figueroa 5/2/2025 1:14 AM   Dictation workstation:   [id number]      XR chest 1 view   Final Result   No acute cardiopulmonary disease.   Signed by Fransisco Thomson, DO         Medications:     Scheduled:  Scheduled Medications[1]Continuous:  Continuous Medications[2]  PRN:  PRN Medications[3]     Assessment & Plan:     Lv Quinteros is a 84 y.o. male with a PMH of HTN, HLD, basal cell skin carcinoma, CAD s/p CABG x1 in 1997, CVA w/ residual left sided weakness, A-fib on Eliquis, and anxiety admitted for acute hypoxic respiratory failure 22/2 community acquired pneumonia. Troponin elevated but patient has no cardiac symptoms so likely non-ischemic myocardial injury from pneumonia.     ACUTE ISSUES:  #Acute hypoxic respiratory failure  #Community acquired pneumonia   #Generalized weakness   - Endorses cough, shortness of breath, fever, generalized weakness  - Flu/covid/RSV negative  - CT chest negative for PE, mild ground glass opacities in lower lungs L>R   PLAN:  - Continue ceftriaxone and azithromycin (5/2-)  - Incentive spirometer, flutter valve, DuoNebs, Tessalon, Tylenol, and Mucinex for supportive care  - Wean oxygen as tolerated (baseline is room air)   - PT/OT     #Insomnia  - Patient reports trouble  sleeping, would like to try medication  PLAN  - Start melatonin 10mg nightly scheduled, trazodone 25mg as needed nightly     CHRONIC ISSUES:  #HTN, atrial fibrillation - continue home metoprolol and Eliquis  #HLD, hx CVA - continue home atorvastatin  #Allergies - continue home Zyrtec, flonase and ipratropium nasal sprays    Fluid: Replete PRN  Electrolytes: Replete PRN  Nutrition: Regular diet  GI PPx: none  DVT/PE PPx: home Eliquis  Abx: ceftriaxone and azithromycin   IV Lines: PIV  O2: 2L, wean as tolerated     Disposition: Pending PT/OT evaluation, improvement of respiration     Anshu Wright MD   PGY-1, Transitional Year            [1] apixaban, 5 mg, oral, BID  [START ON 5/3/2025] azithromycin, 500 mg, oral, q24h ALEJANDRA  [START ON 5/3/2025] cefTRIAXone, 1 g, intravenous, q24h  cetirizine, 10 mg, oral, Daily  guaiFENesin, 600 mg, oral, BID  melatonin, 10 mg, oral, Nightly  metoprolol succinate XL, 100 mg, oral, Daily  metoprolol succinate XL, 50 mg, oral, Nightly  oxygen, , inhalation, Continuous - Inhalation  rosuvastatin, 20 mg, oral, Nightly  [2]    [3] PRN medications: acetaminophen, fluticasone, ipratropium, ipratropium-albuteroL, traZODone

## 2025-05-02 NOTE — PROGRESS NOTES
05/02/25 1133   Discharge Planning   Living Arrangements Alone  (ex wife Ciarra reports he lives home alone but has a GF)   Support Systems Spouse/significant other;Children   Assistance Needed patient is A&Ox3, independent in ADL's, walker/cane at home, no DME, drives   Type of Residence Private residence   Number of Stairs to Enter Residence 0   Number of Stairs Within Residence 2   Do you have animals or pets at home? No   Who is requesting discharge planning? Provider   Home or Post Acute Services None   Expected Discharge Disposition Home  (PT/OT pending)   Does the patient need discharge transport arranged? No

## 2025-05-02 NOTE — ED PROCEDURE NOTE
Procedure  Critical Care    Performed by: Christofer Hodgson DO  Authorized by: Christofer Hodgson DO    Critical care provider statement:     Critical care time (minutes):  35    Critical care time was exclusive of:  Separately billable procedures and treating other patients and teaching time    Critical care was necessary to treat or prevent imminent or life-threatening deterioration of the following conditions:  Respiratory failure    Critical care was time spent personally by me on the following activities:  Ordering and performing treatments and interventions, ordering and review of laboratory studies, ordering and review of radiographic studies, pulse oximetry, re-evaluation of patient's condition, review of old charts, examination of patient, evaluation of patient's response to treatment, development of treatment plan with patient or surrogate and obtaining history from patient or surrogate    Care discussed with: admitting provider                 Christofer Hodgson DO  05/02/25 0140

## 2025-05-02 NOTE — CONSULTS
"Patient has Malnutrition Diagnosis: No  Nutrition Assessment    Reason for Assessment: Provider consult order    Patient is a 84 y.o. male presenting with: Pneumonia due to infectious organism, unspecified laterality, unspecified part of lung,   Flu, COVID, and RSV are negative  Pt on ATB  PT/OT consulted for eval  Medical History[1]   Surgical History[2]   Nutrition History:  Food and Nutrient History: Pt reports his appetite has not been too good since he had a CVA a few years ago. Weight stable, skin intact. Pt declines ONS when offered.  Energy Intake: Fair 50-75 %  RX Allergies[3]   GI Symptoms: None     Oral Problems: None          Anthropometrics:  Height: 177.8 cm (5' 10\")   Weight: 74.2 kg (163 lb 9.6 oz)   BMI (Calculated): 23.47  IBW/kg (Dietitian Calculated): 75.5 kg  Percent of IBW: 98 %       Weight History:     Daily Weight  05/02/25 : 74.2 kg (163 lb 9.6 oz)  05/01/25 : 76.3 kg (168 lb 2 oz)  04/16/25 : 76.6 kg (168 lb 14 oz)  11/26/24 : 74 kg (163 lb 2 oz)  11/19/24 : 71.7 kg (158 lb)  10/09/24 : 68.9 kg (152 lb)  06/12/24 : 73.4 kg (161 lb 13.1 oz)  05/20/24 : 76 kg (167 lb 8.8 oz)  04/16/24 : 75 kg (165 lb 4 oz)  04/14/24 : 71.6 kg (157 lb 13.6 oz)    Weight         5/1/2025  2013 5/2/2025  0348          Weight: 72.6 kg (160 lb) 74.2 kg (163 lb 9.6 oz)              Weight Change %:     Significant Weight Loss: No          Nutrition Focused Physical Exam Findings:    Subcutaneous Fat Loss  Orbital Fat Pads: Well nourished (slightly bulging fat pads)  Buccal Fat Pads: Well nourished (full, rounded cheeks)  Muscle Wasting  Temporalis: Well nourished (well-defined muscle)  Edema  Edema Location: BLE edema  Physical Findings  Skin: Negative (skin intact)    Nutrition Significant Labs:    Results from last 7 days   Lab Units 05/02/25  0631 05/01/25 2020   GLUCOSE mg/dL 95 103*   SODIUM mmol/L 136 136   POTASSIUM mmol/L 4.1 3.8   CHLORIDE mmol/L 100 102   CO2 mmol/L 27 26   BUN mg/dL 11 11   CREATININE " "mg/dL 0.88 0.82   EGFR mL/min/1.73m*2 85 87   CALCIUM mg/dL 8.5* 8.7   PHOSPHORUS mg/dL 3.1  --    MAGNESIUM mg/dL 1.64 1.69     Lab Results   Component Value Date    HGBA1C 5.2 11/20/2024    HGBA1C 5.1 10/17/2023    HGBA1C 5.0 05/31/2022         Lab Results   Component Value Date    ALBUMIN 3.8 05/02/2025      No results found for: \"CRP\"    Nutrition Specific Medications:   Scheduled medications:  Scheduled Medications[4]  Continuous medications:  Continuous Medications[5]  PRN medications:  PRN Medications[6]     Nursing Data Per flowsheet:      Gastrointestinal  Gastrointestinal (WDL): Within Defined Limits  Feeding assistance level: Independent    Intake/Output Summary (Last 24 hours) at 5/2/2025 1426  Last data filed at 5/2/2025 1335  Gross per 24 hour   Intake 320 ml   Output 450 ml   Net -130 ml      0-10 (Numeric) Pain Score: 0 - No pain   Dietary Orders (From admission, onward)       Start     Ordered    05/02/25 0802  Adult diet Regular  Diet effective now        Question:  Diet type  Answer:  Regular    05/02/25 0801    05/02/25 0354  May Participate in Room Service  ( ROOM SERVICE MAY PARTICIPATE)  Once        Question:  .  Answer:  Yes    05/02/25 0353                     Estimated Needs:   Total Energy Estimated Needs in 24 hours (kCal): 1850 kCal  Method for Estimating Needs: 25 kcal/kg  Total Protein Estimated Needs in 24 Hours (g):  (81-96)  Method for Estimating 24 Hour Protein Needs: 1.1-1.3 g/kg     Method for Estimating 24 Hour Fluid Needs: 1 ml/kcal or per MD       Nutrition Diagnosis   Malnutrition Diagnosis  Patient has Malnutrition Diagnosis: No    Nutrition Diagnosis  Patient has Nutrition Diagnosis: No       Nutrition Interventions/Recommendations   Nutrition Prescription:  diet, fluids, ONS    Nutrition Interventions:     Interventions: Meals and snacks     Goal: 2 pm snack  Coordination of Care: n/a  Nutrition Education:   N/A  Recommendations:  Continue with current diet  Weights: 2-3 " x/week         Nutrition Monitoring and Evaluation   Food/Nutrient Related History Monitoring  Monitoring and Evaluation Plan: Intake / amount of food  Intake / Amount of food: Consumes at least 75% or more of meals/snacks/supplements    Anthropometric Measurements  Monitoring and Evaluation Plan: Body weight  Body Weight: Measured body weight  Criteria: Monitor    Biochemical Data, Medical Tests and Procedures  Monitoring and Evaluation Plan: Electrolyte/renal panel, Glucose/endocrine profile  Criteria: Monitor      Goal Status: New goal(s) identified    Time Spent (min): 60 minutes         [1]   Past Medical History:  Diagnosis Date    Actinic keratosis 05/09/2023    Ankylosing spondylitis     Arrhythmia     PAF    Arthritis     Cholelithiasis without obstruction 02/25/2020    Coronary artery disease     Cough 10/04/2023    COVID-19 10/04/2023    Erythema intertrigo 05/09/2023    High prostate specific antigen (PSA) 08/21/2019    Hypertension     Inflamed seborrheic keratosis 05/09/2023    Melanocytic nevi of unspecified upper limb, including shoulder 05/09/2023    Sore throat 10/04/2023    Stroke (Multi)     approx 3 yrs ago   [2]   Past Surgical History:  Procedure Laterality Date    CATARACT EXTRACTION      COLONOSCOPY      CORONARY ARTERY BYPASS GRAFT      x1  20 yrs ago at [facility]    TONSILLECTOMY     [3]   Allergies  Allergen Reactions    Penicillins Hives, Rash, Shortness of breath and Unknown    Other Other     flushing, near-syncope    Statins-Hmg-Coa Reductase Inhibitors Unknown     muscle weakness on Lipitor; crestor ok   [4] apixaban, 5 mg, oral, BID  [START ON 5/3/2025] azithromycin, 500 mg, oral, q24h ALEJANDRA  [START ON 5/3/2025] cefTRIAXone, 1 g, intravenous, q24h  cetirizine, 10 mg, oral, Daily  guaiFENesin, 600 mg, oral, BID  ipratropium-albuteroL, 3 mL, nebulization, TID  melatonin, 10 mg, oral, Nightly  metoprolol succinate XL, 100 mg, oral, Daily  metoprolol succinate XL, 50 mg, oral, Nightly  oxygen,  , inhalation, Continuous - Inhalation  rosuvastatin, 20 mg, oral, Nightly     [5]    [6] PRN medications: acetaminophen, benzonatate, fluticasone, ipratropium, ipratropium-albuteroL, traZODone

## 2025-05-03 LAB
ALBUMIN SERPL BCP-MCNC: 3.3 G/DL (ref 3.4–5)
ANION GAP SERPL CALC-SCNC: 11 MMOL/L (ref 10–20)
BASOPHILS # BLD AUTO: 0.03 X10*3/UL (ref 0–0.1)
BASOPHILS NFR BLD AUTO: 0.4 %
BUN SERPL-MCNC: 16 MG/DL (ref 6–23)
CALCIUM SERPL-MCNC: 8.3 MG/DL (ref 8.6–10.3)
CARDIAC TROPONIN I PNL SERPL HS: 26 NG/L (ref 0–20)
CHLORIDE SERPL-SCNC: 102 MMOL/L (ref 98–107)
CO2 SERPL-SCNC: 28 MMOL/L (ref 21–32)
CREAT SERPL-MCNC: 0.97 MG/DL (ref 0.5–1.3)
EGFRCR SERPLBLD CKD-EPI 2021: 77 ML/MIN/1.73M*2
EOSINOPHIL # BLD AUTO: 0.24 X10*3/UL (ref 0–0.4)
EOSINOPHIL NFR BLD AUTO: 2.9 %
ERYTHROCYTE [DISTWIDTH] IN BLOOD BY AUTOMATED COUNT: 13.5 % (ref 11.5–14.5)
GLUCOSE SERPL-MCNC: 99 MG/DL (ref 74–99)
HCT VFR BLD AUTO: 47.7 % (ref 41–52)
HGB BLD-MCNC: 15.9 G/DL (ref 13.5–17.5)
IMM GRANULOCYTES # BLD AUTO: 0.03 X10*3/UL (ref 0–0.5)
IMM GRANULOCYTES NFR BLD AUTO: 0.4 % (ref 0–0.9)
LEGIONELLA AG UR QL: NEGATIVE
LYMPHOCYTES # BLD AUTO: 1.09 X10*3/UL (ref 0.8–3)
LYMPHOCYTES NFR BLD AUTO: 13.3 %
MAGNESIUM SERPL-MCNC: 1.87 MG/DL (ref 1.6–2.4)
MCH RBC QN AUTO: 31.5 PG (ref 26–34)
MCHC RBC AUTO-ENTMCNC: 33.3 G/DL (ref 32–36)
MCV RBC AUTO: 95 FL (ref 80–100)
MONOCYTES # BLD AUTO: 0.47 X10*3/UL (ref 0.05–0.8)
MONOCYTES NFR BLD AUTO: 5.7 %
NEUTROPHILS # BLD AUTO: 6.36 X10*3/UL (ref 1.6–5.5)
NEUTROPHILS NFR BLD AUTO: 77.3 %
NRBC BLD-RTO: 0 /100 WBCS (ref 0–0)
PHOSPHATE SERPL-MCNC: 2.3 MG/DL (ref 2.5–4.9)
PLATELET # BLD AUTO: 136 X10*3/UL (ref 150–450)
POTASSIUM SERPL-SCNC: 3.6 MMOL/L (ref 3.5–5.3)
RBC # BLD AUTO: 5.04 X10*6/UL (ref 4.5–5.9)
S PNEUM AG UR QL: NEGATIVE
SODIUM SERPL-SCNC: 137 MMOL/L (ref 136–145)
STAPHYLOCOCCUS SPEC CULT: NORMAL
WBC # BLD AUTO: 8.2 X10*3/UL (ref 4.4–11.3)

## 2025-05-03 PROCEDURE — 84484 ASSAY OF TROPONIN QUANT: CPT | Performed by: INTERNAL MEDICINE

## 2025-05-03 PROCEDURE — 36415 COLL VENOUS BLD VENIPUNCTURE: CPT

## 2025-05-03 PROCEDURE — 97165 OT EVAL LOW COMPLEX 30 MIN: CPT | Mod: GO

## 2025-05-03 PROCEDURE — 80069 RENAL FUNCTION PANEL: CPT

## 2025-05-03 PROCEDURE — 2500000005 HC RX 250 GENERAL PHARMACY W/O HCPCS

## 2025-05-03 PROCEDURE — 2500000004 HC RX 250 GENERAL PHARMACY W/ HCPCS (ALT 636 FOR OP/ED)

## 2025-05-03 PROCEDURE — 85025 COMPLETE CBC W/AUTO DIFF WBC: CPT

## 2025-05-03 PROCEDURE — 94640 AIRWAY INHALATION TREATMENT: CPT

## 2025-05-03 PROCEDURE — 94760 N-INVAS EAR/PLS OXIMETRY 1: CPT

## 2025-05-03 PROCEDURE — 2500000004 HC RX 250 GENERAL PHARMACY W/ HCPCS (ALT 636 FOR OP/ED): Mod: JZ

## 2025-05-03 PROCEDURE — 2500000002 HC RX 250 W HCPCS SELF ADMINISTERED DRUGS (ALT 637 FOR MEDICARE OP, ALT 636 FOR OP/ED): Performed by: INTERNAL MEDICINE

## 2025-05-03 PROCEDURE — 2500000002 HC RX 250 W HCPCS SELF ADMINISTERED DRUGS (ALT 637 FOR MEDICARE OP, ALT 636 FOR OP/ED)

## 2025-05-03 PROCEDURE — 2500000005 HC RX 250 GENERAL PHARMACY W/O HCPCS: Performed by: INTERNAL MEDICINE

## 2025-05-03 PROCEDURE — 1100000001 HC PRIVATE ROOM DAILY

## 2025-05-03 PROCEDURE — 94668 MNPJ CHEST WALL SBSQ: CPT

## 2025-05-03 PROCEDURE — 2500000001 HC RX 250 WO HCPCS SELF ADMINISTERED DRUGS (ALT 637 FOR MEDICARE OP)

## 2025-05-03 PROCEDURE — 83735 ASSAY OF MAGNESIUM: CPT

## 2025-05-03 PROCEDURE — 99233 SBSQ HOSP IP/OBS HIGH 50: CPT

## 2025-05-03 RX ORDER — ACETAMINOPHEN 325 MG/1
650 TABLET ORAL EVERY 6 HOURS
Status: DISCONTINUED | OUTPATIENT
Start: 2025-05-03 | End: 2025-05-04 | Stop reason: HOSPADM

## 2025-05-03 RX ORDER — BENZONATATE 100 MG/1
100 CAPSULE ORAL 3 TIMES DAILY
Status: DISCONTINUED | OUTPATIENT
Start: 2025-05-03 | End: 2025-05-04 | Stop reason: HOSPADM

## 2025-05-03 RX ORDER — SODIUM,POTASSIUM PHOSPHATES 280-250MG
1 POWDER IN PACKET (EA) ORAL 4 TIMES DAILY
Status: COMPLETED | OUTPATIENT
Start: 2025-05-03 | End: 2025-05-03

## 2025-05-03 RX ORDER — HYDROCODONE BITARTRATE AND HOMATROPINE METHYLBROMIDE ORAL SOLUTION 5; 1.5 MG/5ML; MG/5ML
5 LIQUID ORAL EVERY 6 HOURS PRN
Status: DISCONTINUED | OUTPATIENT
Start: 2025-05-03 | End: 2025-05-04 | Stop reason: HOSPADM

## 2025-05-03 RX ADMIN — AZITHROMYCIN DIHYDRATE 500 MG: 500 TABLET ORAL at 09:22

## 2025-05-03 RX ADMIN — IPRATROPIUM BROMIDE AND ALBUTEROL SULFATE 3 ML: 2.5; .5 SOLUTION RESPIRATORY (INHALATION) at 08:58

## 2025-05-03 RX ADMIN — IPRATROPIUM BROMIDE 1 SPRAY: 21 SPRAY, METERED NASAL at 19:29

## 2025-05-03 RX ADMIN — POTASSIUM & SODIUM PHOSPHATES POWDER PACK 280-160-250 MG 1 PACKET: 280-160-250 PACK at 15:36

## 2025-05-03 RX ADMIN — APIXABAN 5 MG: 5 TABLET, FILM COATED ORAL at 21:22

## 2025-05-03 RX ADMIN — CEFTRIAXONE 1 G: 1 INJECTION, SOLUTION INTRAVENOUS at 23:40

## 2025-05-03 RX ADMIN — IPRATROPIUM BROMIDE AND ALBUTEROL SULFATE 3 ML: 2.5; .5 SOLUTION RESPIRATORY (INHALATION) at 21:00

## 2025-05-03 RX ADMIN — POTASSIUM & SODIUM PHOSPHATES POWDER PACK 280-160-250 MG 1 PACKET: 280-160-250 PACK at 09:22

## 2025-05-03 RX ADMIN — ROSUVASTATIN CALCIUM 20 MG: 20 TABLET, FILM COATED ORAL at 21:22

## 2025-05-03 RX ADMIN — ACETAMINOPHEN 650 MG: 325 TABLET, FILM COATED ORAL at 09:26

## 2025-05-03 RX ADMIN — GUAIFENESIN 600 MG: 600 TABLET ORAL at 09:21

## 2025-05-03 RX ADMIN — BENZONATATE 100 MG: 100 CAPSULE ORAL at 21:22

## 2025-05-03 RX ADMIN — METOPROLOL SUCCINATE 100 MG: 50 TABLET, EXTENDED RELEASE ORAL at 09:21

## 2025-05-03 RX ADMIN — ACETAMINOPHEN 650 MG: 325 TABLET, FILM COATED ORAL at 21:26

## 2025-05-03 RX ADMIN — METOPROLOL SUCCINATE 50 MG: 50 TABLET, EXTENDED RELEASE ORAL at 21:22

## 2025-05-03 RX ADMIN — GUAIFENESIN 600 MG: 600 TABLET ORAL at 21:24

## 2025-05-03 RX ADMIN — TRAZODONE HYDROCHLORIDE 25 MG: 50 TABLET ORAL at 21:22

## 2025-05-03 RX ADMIN — Medication 10 MG: at 21:22

## 2025-05-03 RX ADMIN — CEFTRIAXONE 1 G: 1 INJECTION, SOLUTION INTRAVENOUS at 00:32

## 2025-05-03 RX ADMIN — BENZONATATE 100 MG: 100 CAPSULE ORAL at 09:21

## 2025-05-03 RX ADMIN — Medication 1 L/MIN: at 08:58

## 2025-05-03 RX ADMIN — CETIRIZINE HYDROCHLORIDE 10 MG: 10 TABLET, FILM COATED ORAL at 09:21

## 2025-05-03 RX ADMIN — BENZONATATE 100 MG: 100 CAPSULE ORAL at 15:36

## 2025-05-03 RX ADMIN — APIXABAN 5 MG: 5 TABLET, FILM COATED ORAL at 09:22

## 2025-05-03 RX ADMIN — IPRATROPIUM BROMIDE 1 SPRAY: 21 SPRAY, METERED NASAL at 09:23

## 2025-05-03 ASSESSMENT — COGNITIVE AND FUNCTIONAL STATUS - GENERAL
TOILETING: A LITTLE
PERSONAL GROOMING: A LITTLE
MOVING TO AND FROM BED TO CHAIR: A LITTLE
DAILY ACTIVITIY SCORE: 22
WALKING IN HOSPITAL ROOM: A LITTLE
DAILY ACTIVITIY SCORE: 23
MOBILITY SCORE: 20
STANDING UP FROM CHAIR USING ARMS: A LITTLE
PERSONAL GROOMING: A LITTLE
TOILETING: A LITTLE
CLIMB 3 TO 5 STEPS WITH RAILING: A LITTLE
TOILETING: A LITTLE
DAILY ACTIVITIY SCORE: 22
MOBILITY SCORE: 24

## 2025-05-03 ASSESSMENT — PAIN SCALES - GENERAL
PAINLEVEL_OUTOF10: 0 - NO PAIN

## 2025-05-03 ASSESSMENT — PAIN - FUNCTIONAL ASSESSMENT
PAIN_FUNCTIONAL_ASSESSMENT: 0-10

## 2025-05-03 ASSESSMENT — ACTIVITIES OF DAILY LIVING (ADL)
ADL_ASSISTANCE: INDEPENDENT
BATHING_ASSISTANCE: INDEPENDENT

## 2025-05-03 NOTE — PROGRESS NOTES
"Subjective   Subjective:   History Of Present Illness:  Lv Quinteros is a 84 y.o. male was seen and evaluated at bedside today. Overnight, no acute events. Patient states he slept well last night. Endorses still having a cough and that his body feels sore from coughing so much. He is using his flutter valve and incentive spirometer. Has no other specific concerns at this time. Able to ambulate better than before.       Objective   Objective:     /72 (BP Location: Left arm)   Pulse 73   Temp 36.7 °C (98.1 °F) (Temporal) Comment: Vitals reviewed  Resp 18   Ht 1.778 m (5' 10\")   Wt 74.2 kg (163 lb 9.6 oz)   SpO2 96%   BMI 23.47 kg/m²     Physical Exam  Constitutional:       Appearance: Normal appearance.   HENT:      Head: Normocephalic and atraumatic.      Nose: Nose normal.      Mouth/Throat:      Mouth: Mucous membranes are moist.   Eyes:      Conjunctiva/sclera: Conjunctivae normal.   Cardiovascular:      Rate and Rhythm: Normal rate and regular rhythm.   Pulmonary:      Effort: Pulmonary effort is normal.   Skin:     General: Skin is warm and dry.   Neurological:      Mental Status: He is alert.      Comments: oriented   Psychiatric:         Mood and Affect: Mood normal.         Behavior: Behavior normal.         Thought Content: Thought content normal.       Lab Work:     Lab Results   Component Value Date    WBC 8.2 05/03/2025    HGB 15.9 05/03/2025    HCT 47.7 05/03/2025    MCV 95 05/03/2025     (L) 05/03/2025     Lab Results   Component Value Date    GLUCOSE 99 05/03/2025    CALCIUM 8.3 (L) 05/03/2025     05/03/2025    K 3.6 05/03/2025    CO2 28 05/03/2025     05/03/2025    BUN 16 05/03/2025    CREATININE 0.97 05/03/2025     Hemoglobin A1C   Date Value Ref Range Status   11/20/2024 5.2 See comment % Final   10/17/2023 5.1 see below % Final   05/31/2022 5.0 4.3 - 5.6 % Final     Comment:     American Diabetes Association guidelines indicate that patients with HgbA1c in the " range 5.7-6.4% are at increased risk for development of diabetes, and intervention by lifestyle modification may be beneficial. HgbA1c greater or equal to 6.5% is considered diagnostic of diabetes.     Thyroid Stimulating Hormone   Date Value Ref Range Status   11/19/2024 1.35 0.44 - 3.98 mIU/L Final   11/19/2024 1.47 0.44 - 3.98 mIU/L Final   10/17/2023 1.39 0.44 - 3.98 mIU/L Final     Cultures:   No results found for the last 90 days.    Images:     CT angio chest for pulmonary embolism   Final Result   No evidence of acute pulmonary embolism.        Mild groundglass opacities in the left greater than right lower lungs   concerning for mild pneumonia/pneumonitis.        MACRO:   None        Signed by: Hung Figueroa 5/2/2025 1:14 AM   Dictation workstation:   [id number]      XR chest 1 view   Final Result   No acute cardiopulmonary disease.   Signed by Fransisco Thomson, DO         Medications:     Scheduled:  Scheduled Medications[1]Continuous:  Continuous Medications[2]  PRN:  PRN Medications[3]     Assessment & Plan:     Lv Quinteros is a 84 y.o. male with a PMH of HTN, HLD, basal cell skin carcinoma, CAD s/p CABG x1 in 1997, CVA w/ residual left sided weakness, A-fib on Eliquis, and anxiety admitted for acute hypoxic respiratory failure 2/2 community acquired pneumonia. Troponin elevated but patient has no cardiac symptoms so likely non-ischemic myocardial injury from pneumonia. Patient will be treated with antibiotics and supportive care for CAP.     ACUTE ISSUES:  #Acute hypoxic respiratory failure  #Community acquired pneumonia   #Generalized weakness   - Endorses cough, shortness of breath, fever, generalized weakness  - Flu/covid/RSV negative  - CT chest negative for PE, mild ground glass opacities in lower lungs L>R   PLAN:  - Continue ceftriaxone and azithromycin (5/2-)  - Incentive spirometer, flutter valve, DuoNebs, Tessalon, Tylenol, and Mucinex scheduled for supportive care  - Hycodan syrup as  needed for severe cough   - Wean oxygen as tolerated (baseline is room air)   - PT/OT   - Walking pulse ox prior to discharge to evaluate home oxygen needs    #Insomnia  - Patient reports trouble sleeping, would like to try medication  - Improved with medication   PLAN  - Continue melatonin 10mg nightly scheduled, trazodone 25mg as needed nightly     CHRONIC ISSUES:  #HTN, atrial fibrillation - continue home metoprolol and Eliquis  #HLD, hx CVA - continue home atorvastatin  #Allergies - continue home Zyrtec, flonase and ipratropium nasal sprays    Fluid: Replete PRN  Electrolytes: Replete PRN  Nutrition: Regular diet  GI PPx: none  DVT/PE PPx: home Eliquis  Abx: ceftriaxone and azithromycin   IV Lines: PIV  O2: 2L, wean as tolerated     Disposition: Pending PT/OT evaluation, improvement of respiration     Anshu Wright MD   PGY-1, Transitional Year            [1] apixaban, 5 mg, oral, BID  azithromycin, 500 mg, oral, q24h ALEJANDRA  benzonatate, 100 mg, oral, TID  cefTRIAXone, 1 g, intravenous, q24h  cetirizine, 10 mg, oral, Daily  guaiFENesin, 600 mg, oral, BID  ipratropium-albuteroL, 3 mL, nebulization, TID  melatonin, 10 mg, oral, Nightly  metoprolol succinate XL, 100 mg, oral, Daily  metoprolol succinate XL, 50 mg, oral, Nightly  potassium, sodium phosphates, 1 packet, oral, 4x daily  rosuvastatin, 20 mg, oral, Nightly     [2]    [3] PRN medications: acetaminophen, fluticasone, hydrocodone-homatropine, ipratropium, ipratropium-albuteroL, oxygen, traZODone

## 2025-05-03 NOTE — PROGRESS NOTES
Occupational Therapy    Evaluation    Patient Name: Lv Quinteros  MRN: [id number]  Department: [address]  Room: [address] [place] [address]  Today's Date: 5/3/2025  Time Calculation  Start Time: 1313  Stop Time: 1329  Time Calculation (min): 16 min    Assessment  IP OT Assessment  OT Assessment: Pt presents at baseline with ADL performance and functional mobility. No further skilled OT needs identified.  Prognosis: Good  Barriers to Discharge Home: No anticipated barriers  Evaluation/Treatment Tolerance: Patient tolerated treatment well  Medical Staff Made Aware: Yes  End of Session Communication: Bedside nurse  End of Session Patient Position: Bed, 3 rail up, Alarm off, caregiver present (no alarm needed per staff)  Plan:  No Skilled OT: No acute OT goals identified  OT Frequency: OT eval only  OT Discharge Recommendations: No further acute OT  Equipment Recommended upon Discharge: Wheeled walker  OT Recommended Transfer Status: Stand by assist  OT - OK to Discharge: Yes (per OT POC)    Subjective   Current Problem:  1. Pneumonia due to infectious organism, unspecified laterality, unspecified part of lung        2. Sepsis, due to unspecified organism, unspecified whether acute organ dysfunction present (Multi)        3. Acute respiratory failure with hypoxia        4. Elevated brain natriuretic peptide (BNP) level        5. Elevated troponin          General:  General  Reason for Referral: 85 yo male referred to OT for impaired ADLs/mobility  Referred By: Joe Singh MD  Past Medical History Relevant to Rehab: HTN, HLD, basal cell skin carcinoma, CAD s/p CABG x1 in 1997, CVA w/ residual left sided weakness, A-fib on Eliquis, anxiety  Family/Caregiver Present: Yes  Caregiver Feedback: girlfriend present, able to observe session  Prior to Session Communication: Bedside nurse  Patient Position Received: Up in room, Alarm off, caregiver present  General Comment: Pt pleasant, cooperative with OT evaluation  Precautions:  Medical  Precautions: Fall precautions, Oxygen therapy device and L/min (1L NC, tele)     Date/Time Vitals Session Patient Position Pulse Resp SpO2 BP MAP (mmHg)    05/03/25 1313 During OT  --  --  --  93 %  --  --     05/03/25 1336 --  --  --  --  94 %  --  --                Pain:  Pain Assessment  Pain Assessment: 0-10  0-10 (Numeric) Pain Score: 0 - No pain    Objective   Cognition:  Overall Cognitive Status: Within Functional Limits  Arousal/Alertness: Appropriate responses to stimuli  Orientation Level: Oriented X4           Home Living:  Type of Home: House  Lives With: Alone (girl friend visits every day)  Home Adaptive Equipment: Walker rolling or standard  Home Layout: One level  Home Access: Stairs to enter without rails  Entrance Stairs-Number of Steps: 1  Bathroom Shower/Tub: Walk-in shower  Bathroom Toilet: Handicapped height  Bathroom Equipment: Grab bars in shower, Built-in shower seat, Grab bars around toilet   Prior Function:  Level of [place]: Independent with ADLs and functional transfers, Independent with homemaking with ambulation  ADL Assistance: Independent  Homemaking Assistance: Independent  Ambulatory Assistance: Independent (no devices)  IADL History:  Mode of Transportation:  (drives self)  ADL:  Eating Assistance: Independent  Grooming Assistance: Independent  Bathing Assistance: Independent  UE Dressing Assistance: Independent  LE Dressing Assistance: Independent  Toileting Assistance with Device: Stand by  Functional Assistance: Stand by  ADL Comments: ADL performance anticipated  Activity Tolerance:  Endurance: Tolerates 10 - 20 min exercise with multiple rests  Bed Mobility/Transfers: Bed Mobility  Bed Mobility: Yes  Bed Mobility 1  Bed Mobility 1: Sitting to supine  Level of Assistance 1: Modified independent    Transfers  Transfer: Yes  Transfer 1  Transfer From 1: Stand to  Transfer to 1: Sit  Technique 1: Stand to sit  Transfer Device 1: Walker  Transfer Level of Assistance 1:  Independent      Functional Mobility:  Functional Mobility  Functional Mobility Performed: Yes  Functional Mobility 1  Surface 1: Level tile  Device 1: Rolling walker  Assistance 1: Close supervision  Comments 1: Ambulated in hallway ~225' with good balance, good safety awareness, no signs of respiratory distress  Sitting Balance:  Static Sitting Balance  Static Sitting-Balance Support: Feet supported  Static Sitting-Level of Assistance: Independent  Standing Balance:  Static Standing Balance  Static Standing-Balance Support: Bilateral upper extremity supported  Static Standing-Level of Assistance: Distant supervision     IADL's:   Mode of Transportation:  (drives self)    Strength:  Strength Comments: BUE WFL    Coordination:  Movements are Fluid and Coordinated: Yes   Hand Function:  Hand Function  Gross Grasp: Functional  Coordination: Functional  Extremities: RUE   RUE : Within Functional Limits and LUE   LUE: Within Functional Limits    Outcome Measures: [facility] Daily Activity  Putting on and taking off regular lower body clothing: None  Bathing (including washing, rinsing, drying): None  Putting on and taking off regular upper body clothing: None  Toileting, which includes using toilet, bedpan or urinal: A little  Taking care of personal grooming such as brushing teeth: None  Eating Meals: None  Daily Activity - Total Score: 23      Education Documentation  Body Mechanics, taught by Evans Reyna OT at 5/3/2025  2:20 PM.  Learner: Significant Other, Patient  Readiness: Acceptance  Method: Explanation  Response: Verbalizes Understanding    Precautions, taught by Evans Reyna OT at 5/3/2025  2:20 PM.  Learner: Significant Other, Patient  Readiness: Acceptance  Method: Explanation  Response: Verbalizes Understanding    ADL Training, taught by Evans Reyna OT at 5/3/2025  2:20 PM.  Learner: Significant Other, Patient  Readiness: Acceptance  Method: Explanation  Response: Verbalizes  Understanding    Education Comments  No comments found.

## 2025-05-03 NOTE — HOSPITAL COURSE
Lv Quinteros is a 84 y.o. male with a PMH of HTN, HLD, basal cell skin carcinoma, CAD s/p CABG x1 in 1997, CVA w/ residual left sided weakness, A-fib on Eliquis, anxiety, who presented to the hospital for fever and worsening weakness.   In the ED patient required supplemental oxygen. He otherwise had unremarkable workup. CXR negative for cardiopulmonary disease. UA negative. Blood cultrues obtained. The patient was started on ceftriaxone/azithromycin for CAP. He also got tylenol, 500c fluid bolus, and DuoNebs. The patient was admitted to medicine for further management.  On the floor the patient remained stable. Ceftriaxone/azithromycin continued. Patient endorsed insomnia which improved with melatonin and trazodone.

## 2025-05-03 NOTE — PROGRESS NOTES
Physical Therapy                 Therapy Communication Note    Patient Name: Lv Quinteros  MRN: [id number]  Department: [address]  Room: [address] [place], [place] [address]  Today's Date: 5/3/2025     Discipline: Physical Therapy          Missed Visit Reason: Missed Visit Reason: Cancel (Per conversation with OT, pt. is functioning at baseline level with FWW.  Pt. was observed ambulating in hallway with nursing, FWW supervised.  No acute PT needs. Continue with restorative nursing in preparation for DC to home once medically appropriate.)    Missed Time: Cancel    Comment:

## 2025-05-04 VITALS
WEIGHT: 163.6 LBS | SYSTOLIC BLOOD PRESSURE: 133 MMHG | DIASTOLIC BLOOD PRESSURE: 72 MMHG | HEIGHT: 70 IN | RESPIRATION RATE: 18 BRPM | OXYGEN SATURATION: 94 % | BODY MASS INDEX: 23.42 KG/M2 | TEMPERATURE: 98.4 F | HEART RATE: 62 BPM

## 2025-05-04 LAB
ALBUMIN SERPL BCP-MCNC: 3.2 G/DL (ref 3.4–5)
ANION GAP SERPL CALC-SCNC: 11 MMOL/L (ref 10–20)
BACTERIA BLD CULT: NORMAL
BACTERIA BLD CULT: NORMAL
BASOPHILS # BLD AUTO: 0.02 X10*3/UL (ref 0–0.1)
BASOPHILS NFR BLD AUTO: 0.3 %
BUN SERPL-MCNC: 14 MG/DL (ref 6–23)
CALCIUM SERPL-MCNC: 8.5 MG/DL (ref 8.6–10.3)
CHLORIDE SERPL-SCNC: 103 MMOL/L (ref 98–107)
CO2 SERPL-SCNC: 29 MMOL/L (ref 21–32)
CREAT SERPL-MCNC: 1.03 MG/DL (ref 0.5–1.3)
EGFRCR SERPLBLD CKD-EPI 2021: 72 ML/MIN/1.73M*2
EOSINOPHIL # BLD AUTO: 0.35 X10*3/UL (ref 0–0.4)
EOSINOPHIL NFR BLD AUTO: 5.9 %
ERYTHROCYTE [DISTWIDTH] IN BLOOD BY AUTOMATED COUNT: 13.5 % (ref 11.5–14.5)
GLUCOSE SERPL-MCNC: 113 MG/DL (ref 74–99)
HCT VFR BLD AUTO: 47.4 % (ref 41–52)
HGB BLD-MCNC: 16.1 G/DL (ref 13.5–17.5)
IMM GRANULOCYTES # BLD AUTO: 0.01 X10*3/UL (ref 0–0.5)
IMM GRANULOCYTES NFR BLD AUTO: 0.2 % (ref 0–0.9)
LYMPHOCYTES # BLD AUTO: 0.95 X10*3/UL (ref 0.8–3)
LYMPHOCYTES NFR BLD AUTO: 16.1 %
MAGNESIUM SERPL-MCNC: 1.91 MG/DL (ref 1.6–2.4)
MCH RBC QN AUTO: 32.2 PG (ref 26–34)
MCHC RBC AUTO-ENTMCNC: 34 G/DL (ref 32–36)
MCV RBC AUTO: 95 FL (ref 80–100)
MONOCYTES # BLD AUTO: 0.49 X10*3/UL (ref 0.05–0.8)
MONOCYTES NFR BLD AUTO: 8.3 %
NEUTROPHILS # BLD AUTO: 4.07 X10*3/UL (ref 1.6–5.5)
NEUTROPHILS NFR BLD AUTO: 69.2 %
NRBC BLD-RTO: 0 /100 WBCS (ref 0–0)
PHOSPHATE SERPL-MCNC: 3 MG/DL (ref 2.5–4.9)
PLATELET # BLD AUTO: 127 X10*3/UL (ref 150–450)
POTASSIUM SERPL-SCNC: 3.9 MMOL/L (ref 3.5–5.3)
RBC # BLD AUTO: 5 X10*6/UL (ref 4.5–5.9)
SODIUM SERPL-SCNC: 139 MMOL/L (ref 136–145)
WBC # BLD AUTO: 5.9 X10*3/UL (ref 4.4–11.3)

## 2025-05-04 PROCEDURE — 80069 RENAL FUNCTION PANEL: CPT

## 2025-05-04 PROCEDURE — 94669 MECHANICAL CHEST WALL OSCILL: CPT

## 2025-05-04 PROCEDURE — 94668 MNPJ CHEST WALL SBSQ: CPT

## 2025-05-04 PROCEDURE — 2500000001 HC RX 250 WO HCPCS SELF ADMINISTERED DRUGS (ALT 637 FOR MEDICARE OP)

## 2025-05-04 PROCEDURE — 2500000004 HC RX 250 GENERAL PHARMACY W/ HCPCS (ALT 636 FOR OP/ED)

## 2025-05-04 PROCEDURE — 94640 AIRWAY INHALATION TREATMENT: CPT

## 2025-05-04 PROCEDURE — 94760 N-INVAS EAR/PLS OXIMETRY 1: CPT

## 2025-05-04 PROCEDURE — 99239 HOSP IP/OBS DSCHRG MGMT >30: CPT

## 2025-05-04 PROCEDURE — 36415 COLL VENOUS BLD VENIPUNCTURE: CPT

## 2025-05-04 PROCEDURE — 85025 COMPLETE CBC W/AUTO DIFF WBC: CPT

## 2025-05-04 PROCEDURE — 2500000002 HC RX 250 W HCPCS SELF ADMINISTERED DRUGS (ALT 637 FOR MEDICARE OP, ALT 636 FOR OP/ED): Performed by: INTERNAL MEDICINE

## 2025-05-04 PROCEDURE — 83735 ASSAY OF MAGNESIUM: CPT

## 2025-05-04 RX ORDER — BENZONATATE 100 MG/1
100 CAPSULE ORAL 3 TIMES DAILY PRN
Qty: 30 CAPSULE | Refills: 0 | Status: SHIPPED | OUTPATIENT
Start: 2025-05-04

## 2025-05-04 RX ORDER — FLUTICASONE PROPIONATE 50 MCG
2 SPRAY, SUSPENSION (ML) NASAL 2 TIMES DAILY PRN
Start: 2025-05-04 | End: 2025-05-28 | Stop reason: WASHOUT

## 2025-05-04 RX ORDER — IPRATROPIUM BROMIDE 21 UG/1
1 SPRAY, METERED NASAL 3 TIMES DAILY PRN
Qty: 15 ML | Refills: 0 | Status: SHIPPED | OUTPATIENT
Start: 2025-05-04

## 2025-05-04 RX ORDER — HYDROCODONE BITARTRATE AND HOMATROPINE METHYLBROMIDE ORAL SOLUTION 5; 1.5 MG/5ML; MG/5ML
5 LIQUID ORAL EVERY 6 HOURS PRN
Qty: 100 ML | Refills: 0 | Status: SHIPPED | OUTPATIENT
Start: 2025-05-04 | End: 2025-05-09

## 2025-05-04 RX ORDER — AMOXICILLIN AND CLAVULANATE POTASSIUM 875; 125 MG/1; MG/1
1 TABLET, FILM COATED ORAL 2 TIMES DAILY
Qty: 9 TABLET | Refills: 0 | Status: SHIPPED | OUTPATIENT
Start: 2025-05-04 | End: 2025-05-09

## 2025-05-04 RX ORDER — TRAZODONE HYDROCHLORIDE 50 MG/1
25 TABLET ORAL NIGHTLY PRN
Qty: 15 TABLET | Refills: 0 | Status: SHIPPED | OUTPATIENT
Start: 2025-05-04 | End: 2025-06-05 | Stop reason: SDUPTHER

## 2025-05-04 RX ORDER — ALBUTEROL SULFATE 90 UG/1
2 INHALANT RESPIRATORY (INHALATION) EVERY 4 HOURS PRN
Qty: 8.5 G | Refills: 0 | Status: SHIPPED | OUTPATIENT
Start: 2025-05-04 | End: 2025-05-28 | Stop reason: WASHOUT

## 2025-05-04 RX ORDER — AMOXICILLIN AND CLAVULANATE POTASSIUM 875; 125 MG/1; MG/1
1 TABLET, FILM COATED ORAL 2 TIMES DAILY
Qty: 9 TABLET | Refills: 0 | Status: CANCELLED | OUTPATIENT
Start: 2025-05-04 | End: 2025-05-09

## 2025-05-04 RX ORDER — ROSUVASTATIN CALCIUM 20 MG/1
20 TABLET, COATED ORAL NIGHTLY
Start: 2025-05-04 | End: 2025-05-28 | Stop reason: SDUPTHER

## 2025-05-04 RX ORDER — AMOXICILLIN AND CLAVULANATE POTASSIUM 875; 125 MG/1; MG/1
1 TABLET, FILM COATED ORAL ONCE
Status: COMPLETED | OUTPATIENT
Start: 2025-05-04 | End: 2025-05-04

## 2025-05-04 RX ADMIN — IPRATROPIUM BROMIDE AND ALBUTEROL SULFATE 3 ML: 2.5; .5 SOLUTION RESPIRATORY (INHALATION) at 07:38

## 2025-05-04 RX ADMIN — BENZONATATE 100 MG: 100 CAPSULE ORAL at 09:13

## 2025-05-04 RX ADMIN — METOPROLOL SUCCINATE 100 MG: 50 TABLET, EXTENDED RELEASE ORAL at 09:13

## 2025-05-04 RX ADMIN — AMOXICILLIN AND CLAVULANATE POTASSIUM 1 TABLET: 875; 125 TABLET, FILM COATED ORAL at 11:47

## 2025-05-04 RX ADMIN — APIXABAN 5 MG: 5 TABLET, FILM COATED ORAL at 09:13

## 2025-05-04 RX ADMIN — CETIRIZINE HYDROCHLORIDE 10 MG: 10 TABLET, FILM COATED ORAL at 09:13

## 2025-05-04 RX ADMIN — AZITHROMYCIN DIHYDRATE 500 MG: 500 TABLET ORAL at 09:13

## 2025-05-04 RX ADMIN — ACETAMINOPHEN 650 MG: 325 TABLET, FILM COATED ORAL at 09:13

## 2025-05-04 RX ADMIN — GUAIFENESIN 600 MG: 600 TABLET ORAL at 09:13

## 2025-05-04 ASSESSMENT — COGNITIVE AND FUNCTIONAL STATUS - GENERAL
MOVING TO AND FROM BED TO CHAIR: A LITTLE
WALKING IN HOSPITAL ROOM: A LITTLE
CLIMB 3 TO 5 STEPS WITH RAILING: A LITTLE
DRESSING REGULAR LOWER BODY CLOTHING: A LITTLE
DAILY ACTIVITIY SCORE: 23
MOBILITY SCORE: 20
STANDING UP FROM CHAIR USING ARMS: A LITTLE

## 2025-05-04 ASSESSMENT — PAIN SCALES - GENERAL: PAINLEVEL_OUTOF10: 0 - NO PAIN

## 2025-05-04 NOTE — DISCHARGE SUMMARY
"  Department of Hospital Medicine  Discharge Summary   Lv Quinteros  Age: 84 y.o.  MRN: [id number]  Date: 5/4/2025  Room Number: 122/122-A       Discharge Diagnosis   #Acute hypoxic respiratory failure  #Community acquired pneumonia   #Generalized weakness   #Hypophosphatemia     Discharge Summary   Lv Quinteros is a 84 y.o. male with a PMH of HTN, HLD, basal cell skin carcinoma, CAD s/p CABG x1 in 1997, CVA w/ residual left sided weakness, A-fib on Eliquis, anxiety, who presented to the hospital for fever and worsening weakness.     In the ED patient required supplemental oxygen. He otherwise had unremarkable workup. CXR negative for cardiopulmonary disease. CTPE was done showing \"CTPE: No PE. Mild groundglass opacities in left greater than right lower lungs concerning for mild pneumonia/pneumonitis\". UA negative. Blood cultrues obtained. The patient was started on ceftriaxone/azithromycin for CAP. He also got tylenol, 500c fluid bolus, and DuoNebs. The patient was admitted to medicine for further management.    On the floor the patient remained stable. Ceftriaxone/azithromycin continued. Patient endorsed insomnia which improved with melatonin and trazodone. Oxygenation improved and he felt better.  Ambulatory pulse ox was done for the patient which showed no supplemental oxygen requirement home-going.  Patient felt improved and ready for discharge on 5/4/25. Pt discharged home today.    Issue Requiring Follow Up   - Patient follow-up with PCP for follow-up after hospital stay    Discharge Recommendations   Your symptoms were due to pneumonia. You are now stable enough to be discharged home.    The following recommendations are made for you at time of hospital discharge:  - Please take your home medications as instructed prior to hospitalization.   - The following changes to your home medications have been made during your hospital stay:  Start taking antibiotic Augmentin 875-125 mg twice daily for 5 days  For " your cough, you can take Mucinex -30 mg twice a day  You can also take Tessalon Perles 100 mg up to 3 times a day for cough  You can also take Hycodan solution 5 mL up to 4 times a day for cough  For acutely worsening shortness of breath, you can use albuterol inhaler  - Please follow-up with your primary care provider within 5-7 days from time of discharge. Likely will need to bring photo ID and insurance card to your appointment.   - [facility] services has been requested to make an appointment for you however if you do not hear back from them within 2-3 days, please call [phone] to find out about appointments with [facility] services.  - If you experience any worsening symptoms or any new acute concerns arise, please contact your primary care provider to discuss and possibly arrange an appointment. If you cannot get in touch with provider or severe symptoms are present, please return to nearest emergency room/urgent care for evaluation and treatment.    It was a pleasure taking care of you.    All the best,  [facility] Inpatient Team      Discharge Meds        Your medication list        START taking these medications        Instructions Last Dose Given Next Dose Due   albuterol 90 mcg/actuation inhaler  Commonly known as: ProAir HFA      Inhale 2 puffs every 4 hours if needed for wheezing or shortness of breath.       Trazodone 25mg tablet Table 1 tablet at bedtime as needed for insomnia      benzonatate 100 mg capsule  Commonly known as: Tessalon      Take 1 capsule (100 mg) by mouth 3 times a day as needed for cough. Do not crush or chew.       dextromethorphan-guaifenesin  mg 12 hr tablet  Commonly known as: Mucinex DM      Take 1 tablet by mouth every 12 hours. Do not crush, chew, or split.       hydrocodone-homatropine 5-1.5 mg/5 mL syrup  Commonly known as: Hycodan      Take 5 mL by mouth every 6 hours if needed for cough for up to 5 days.       ipratropium 21 mcg (0.03 %) nasal  spray  Commonly known as: Atrovent  Replaces: ipratropium 42 mcg (0.06 %) nasal spray      Administer 1 spray into each nostril 3 times a day as needed for rhinitis (Per patient request if does not want Flonase).              CHANGE how you take these medications        Instructions Last Dose Given Next Dose Due   fluticasone 50 mcg/actuation nasal spray  Commonly known as: Flonase  What changed:   when to take this  reasons to take this      Administer 2 sprays into each nostril 2 times a day as needed for rhinitis or allergies. Shake gently. Before first use, prime pump. After use, clean tip and replace cap.              CONTINUE taking these medications        Instructions Last Dose Given Next Dose Due   apixaban 5 mg tablet  Commonly known as: Eliquis      Take 1 tablet (5 mg) by mouth 2 times a day.       loratadine 10 mg tablet  Commonly known as: Claritin           metoprolol succinate  mg 24 hr tablet  Commonly known as: Toprol-XL           rosuvastatin 20 mg tablet  Commonly known as: Crestor      Take 1 tablet (20 mg) by mouth once daily at bedtime.              STOP taking these medications      ipratropium 42 mcg (0.06 %) nasal spray  Commonly known as: Atrovent  Replaced by: ipratropium 21 mcg (0.03 %) nasal spray                  Where to Get Your Medications        These medications were sent to [id number] [id number] [address] [id number]      Phone: [phone]   albuterol 90 mcg/actuation inhaler  benzonatate 100 mg capsule  dextromethorphan-guaifenesin  mg 12 hr tablet  hydrocodone-homatropine 5-1.5 mg/5 mL syrup  ipratropium 21 mcg (0.03 %) nasal spray  Trazodone 25mg at night as needed for insomnia       Information about where to get these medications is not yet available    Ask your nurse or doctor about these medications  fluticasone 50 mcg/actuation nasal spray  rosuvastatin 20 mg tablet        Vitals    Visit Vitals  BP  133/72 (BP Location: Left arm)   Pulse 62   Temp 36.9 °C (98.4 °F) (Temporal)   Resp 18        Physical Exam   Physical Exam  Constitutional:       Appearance: Normal appearance.   HENT:      Head: Normocephalic and atraumatic.      Mouth/Throat:      Mouth: Mucous membranes are moist.   Cardiovascular:      Rate and Rhythm: Normal rate and regular rhythm.      Heart sounds: No murmur heard.     No friction rub. No gallop.   Pulmonary:      Breath sounds: Normal breath sounds. No stridor. No wheezing or rhonchi.   Abdominal:      General: Abdomen is flat. Bowel sounds are normal.      Palpations: Abdomen is soft. There is no mass.      Tenderness: There is no abdominal tenderness. There is no guarding.   Musculoskeletal:         General: No swelling or tenderness. Normal range of motion.      Cervical back: Normal range of motion.   Neurological:      General: No focal deficit present.      Mental Status: He is alert and oriented to person, place, and time.   Psychiatric:         Mood and Affect: Mood normal.         Behavior: Behavior normal.        Future Appointments     Future Appointments   Date Time Provider Department Center   5/7/2025 10:00 AM Angel Hood MD PhD [id number] [facility]   7/21/2025  2:30 PM Chad Buchanan DO [id number] [facility]   8/22/2025  3:30 PM Gely Gore MD [id number] [facility]   4/23/2026 10:00 AM Kiel Devi MD [id number] [facility]          Jose Villafuerte MD  Internal Medicine, PGY- 2  05/04/25 at 1:48 PM

## 2025-05-04 NOTE — DISCHARGE INSTRUCTIONS
Your symptoms were due to pneumonia. You are now stable enough to be discharged home.    The following recommendations are made for you at time of hospital discharge:  - Please take your home medications as instructed prior to hospitalization.   - The following changes to your home medications have been made during your hospital stay:  Start taking antibiotic Augmentin 875-125 mg twice daily  For your cough, you can take Mucinex -30 mg twice a day  You can also take Tessalon Perles 100 mg up to 3 times a day for cough  You can also take Hycodan solution 5 mL up to 4 times a day for cough  For acutely worsening shortness of breath, you can use albuterol inhaler  You are also given trazodone 25 mg nightly as needed for sleep  - Please follow-up with your primary care provider within 5-7 days from time of discharge. Likely will need to bring photo ID and insurance card to your appointment.   -  services has been requested to make an appointment for you however if you do not hear back from them within 2-3 days, please call [phone] to find out about appointments with  services.  - If you experience any worsening symptoms or any new acute concerns arise, please contact your primary care provider to discuss and possibly arrange an appointment. If you cannot get in touch with provider or severe symptoms are present, please return to nearest emergency room/urgent care for evaluation and treatment.    It was a pleasure taking care of you.    All the best,  [facility] Inpatient Team

## 2025-05-04 NOTE — PROGRESS NOTES
05/04/25 0753   Discharge Planning   Expected Discharge Disposition Home  (PT/OT evaluations completed with recommendations for no further discharge needs at this time.)

## 2025-05-04 NOTE — CARE PLAN
The patient's goals for the shift include      The clinical goals for the shift include to rest comfortably    O    
The patient's goals for the shift include  patient will be able to rest during this shift     The clinical goals for the shift include Patient will wean to RA this shift.      Problem: Pain - Adult  Goal: Verbalizes/displays adequate comfort level or baseline comfort level  Outcome: Progressing     Problem: Discharge Planning  Goal: Discharge to home or other facility with appropriate resources  Outcome: Progressing     Problem: Nutrition  Goal: Nutrient intake appropriate for maintaining nutritional needs  Outcome: Progressing     Problem: Diabetes  Goal: Achieve decreasing blood glucose levels by end of shift  Outcome: Progressing     
The patient's goals for the shift include  patient will be able to rest during tonight.     The clinical goals for the shift include to have improvement with cough      Problem: Pain - Adult  Goal: Verbalizes/displays adequate comfort level or baseline comfort level  Outcome: Progressing     Problem: Discharge Planning  Goal: Discharge to home or other facility with appropriate resources  Outcome: Progressing     Problem: Nutrition  Goal: Nutrient intake appropriate for maintaining nutritional needs  Outcome: Progressing     Problem: Diabetes  Goal: Achieve decreasing blood glucose levels by end of shift  Outcome: Progressing     Problem: Diabetes  Goal: Maintain electrolyte levels within acceptable range throughout shift  Outcome: Progressing     
1

## 2025-05-05 ENCOUNTER — OFFICE VISIT (OUTPATIENT)
Dept: CARDIOLOGY | Facility: HOSPITAL | Age: 85
End: 2025-05-05
Payer: MEDICARE

## 2025-05-05 ENCOUNTER — TELEPHONE (OUTPATIENT)
Dept: INTERNAL MEDICINE | Facility: HOSPITAL | Age: 85
End: 2025-05-05
Payer: MEDICARE

## 2025-05-05 ENCOUNTER — ANCILLARY PROCEDURE (OUTPATIENT)
Dept: CARDIOLOGY | Facility: HOSPITAL | Age: 85
End: 2025-05-05
Payer: MEDICARE

## 2025-05-05 ENCOUNTER — PATIENT OUTREACH (OUTPATIENT)
Dept: PRIMARY CARE | Facility: CLINIC | Age: 85
End: 2025-05-05
Payer: MEDICARE

## 2025-05-05 VITALS
WEIGHT: 164.24 LBS | BODY MASS INDEX: 23.57 KG/M2 | OXYGEN SATURATION: 93 % | HEART RATE: 73 BPM | SYSTOLIC BLOOD PRESSURE: 173 MMHG | DIASTOLIC BLOOD PRESSURE: 73 MMHG

## 2025-05-05 DIAGNOSIS — R00.1 BRADYCARDIA: Primary | ICD-10-CM

## 2025-05-05 DIAGNOSIS — I71.21 ANEURYSM OF ASCENDING AORTA WITHOUT RUPTURE: ICD-10-CM

## 2025-05-05 DIAGNOSIS — Z95.1 S/P CABG X 1: ICD-10-CM

## 2025-05-05 DIAGNOSIS — E78.00 HYPERCHOLESTEROLEMIA: ICD-10-CM

## 2025-05-05 DIAGNOSIS — I10 ESSENTIAL (PRIMARY) HYPERTENSION: ICD-10-CM

## 2025-05-05 DIAGNOSIS — R00.1 BRADYCARDIA: ICD-10-CM

## 2025-05-05 LAB
ATRIAL RATE: 76 BPM
P AXIS: 32 DEGREES
P OFFSET: 165 MS
P ONSET: 113 MS
PR INTERVAL: 192 MS
Q ONSET: 209 MS
QRS COUNT: 12 BEATS
QRS DURATION: 140 MS
QT INTERVAL: 412 MS
QTC CALCULATION(BAZETT): 463 MS
QTC FREDERICIA: 445 MS
R AXIS: -64 DEGREES
T AXIS: 24 DEGREES
T OFFSET: 415 MS
VENTRICULAR RATE: 76 BPM

## 2025-05-05 PROCEDURE — 1157F ADVNC CARE PLAN IN RCRD: CPT | Performed by: NURSE PRACTITIONER

## 2025-05-05 PROCEDURE — 99214 OFFICE O/P EST MOD 30 MIN: CPT | Performed by: NURSE PRACTITIONER

## 2025-05-05 PROCEDURE — 3077F SYST BP >= 140 MM HG: CPT | Performed by: NURSE PRACTITIONER

## 2025-05-05 PROCEDURE — 1036F TOBACCO NON-USER: CPT | Performed by: NURSE PRACTITIONER

## 2025-05-05 PROCEDURE — 1111F DSCHRG MED/CURRENT MED MERGE: CPT | Performed by: NURSE PRACTITIONER

## 2025-05-05 PROCEDURE — 1159F MED LIST DOCD IN RCRD: CPT | Performed by: NURSE PRACTITIONER

## 2025-05-05 PROCEDURE — 3078F DIAST BP <80 MM HG: CPT | Performed by: NURSE PRACTITIONER

## 2025-05-05 PROCEDURE — G2211 COMPLEX E/M VISIT ADD ON: HCPCS | Performed by: NURSE PRACTITIONER

## 2025-05-05 PROCEDURE — 93005 ELECTROCARDIOGRAM TRACING: CPT | Performed by: NURSE PRACTITIONER

## 2025-05-05 ASSESSMENT — ENCOUNTER SYMPTOMS
NEUROLOGICAL NEGATIVE: 1
RESPIRATORY NEGATIVE: 1
EYES NEGATIVE: 1
HEMATOLOGIC/LYMPHATIC NEGATIVE: 1
PSYCHIATRIC NEGATIVE: 1
CARDIOVASCULAR NEGATIVE: 1
ENDOCRINE NEGATIVE: 1
GASTROINTESTINAL NEGATIVE: 1

## 2025-05-05 NOTE — TELEPHONE ENCOUNTER
Patient discharged from hospital yesterday.    Patient called today to checkup on patient following discharge to see how he is doing.  He reports he is feeling tired otherwise okay. He tells me his HR is in the 30s and he's normally at 60s. Not feeling lightheaded or dizzy. Did not take his Metoprolol this morning. Going to eat and drink to see if its related to that. Hasn't taken his antibiotic since home. Didn't take any cough medications either. His new cardiologist is Dr. Devi. He says people will be coming to be with him shortly.  If he is feeling okay without other symptoms, informed him he could stay at home and eat/drink to see if improved. If stays in 30s or additional symptoms, recommended he call 911 and come to hospital for evaluation by cardiology. He said he feels comfortable with that. In meantime, I informed him I will try and reach out to the [facility] department and Dr. Devi here to try and get him in if it improves to be seen.   Called [facility] and unfortunately Dr. Devi is not working today.  I arranged appointment in [facility] same day with Latha Borrego and it appeared EP was present as well which was convenient. Patient aware of appointment and made it to it.  Per discussion with Skye, pt had a holter monitor placed to monitor him and he was discharged from the office visit.     Tk Parnell, DO  Hospitalist, [facility]    Patient called me up on 5/7/25. He had few questions  First question was when he can stop taking the antibiotics. From review of his chart, the final day of a 7 day course appears to end on Friday.  He also had question of whether viral or bacterial pneumonia treated. Conveyed to him the procal result and that would finish off treating a bacterial cause though even if it was viral or combination viral-bacterial, the viral part would be treated symptomatically.  He had another question about getting the Shingles vaccine and if he should wait til he recovers.  Reasonably to recover from his infection before getting it as body still recovering at this time.   He again thanked me for the care he received in the hospital.  Wished him well again and to take care.

## 2025-05-05 NOTE — PATIENT INSTRUCTIONS
CALL WITH ANY QUESTIONS   HEART MONITOR FOR TWO WEEKS   WILL CALL TO REVIEW THE RESULTS   CONTINUE METOPROLOL

## 2025-05-05 NOTE — PROGRESS NOTES
Referred by Dr. Mildred junior. provider found for No chief complaint on file.     History Of Present Illness:    Lv Quinteros is a very pleasant 84 year old gentleman with a history of  single-vessel bypass in 1997, CVA, paroxysmal atrial fibrillation (Eliquis), hypertension, dyslipidemia, he is here for a hospital follow up visit. The patient is seen in collaboration with Dr. Devi. Recently admitted with pneumonia. At home heart rate in the 30's. Denies syncope. Occasional lightheadedness.     Review of Systems   Constitutional: Positive for malaise/fatigue.   HENT: Negative.     Eyes: Negative.    Cardiovascular: Negative.    Respiratory: Negative.     Endocrine: Negative.    Hematologic/Lymphatic: Negative.    Skin: Negative.    Musculoskeletal:  Positive for muscle weakness.   Gastrointestinal: Negative.    Neurological: Negative.    Psychiatric/Behavioral: Negative.        Past Medical History:  He has a past medical history of Actinic keratosis (05/09/2023), Ankylosing spondylitis, Arrhythmia, Arthritis, Cholelithiasis without obstruction (02/25/2020), Coronary artery disease, Cough (10/04/2023), COVID-19 (10/04/2023), Erythema intertrigo (05/09/2023), High prostate specific antigen (PSA) (08/21/2019), Hypertension, Inflamed seborrheic keratosis (05/09/2023), Melanocytic nevi of unspecified upper limb, including shoulder (05/09/2023), Sore throat (10/04/2023), and Stroke (Multi).    Past Surgical History:  He has a past surgical history that includes Coronary artery bypass graft; Tonsillectomy; Colonoscopy; and Cataract extraction.      Social History:  He reports that he quit smoking about 31 years ago. His smoking use included cigarettes. He has never used smokeless tobacco. He reports current alcohol use of about 7.0 standard drinks of alcohol per week. He reports that he does not use drugs.    Family History:  Family History[1]     Allergies:  Penicillins, Other, and Statins-hmg-coa reductase  inhibitors    Outpatient Medications:  Current Outpatient Medications   Medication Instructions    albuterol (ProAir HFA) 90 mcg/actuation inhaler 2 puffs, inhalation, Every 4 hours PRN    amoxicillin-clavulanate (Augmentin) 875-125 mg tablet 1 tablet, oral, 2 times daily    apixaban (ELIQUIS) 5 mg, oral, 2 times daily    benzonatate (TESSALON) 100 mg, oral, 3 times daily PRN, Do not crush or chew.    dextromethorphan-guaifenesin (Mucinex DM)  mg 12 hr tablet 1 tablet, oral, Every 12 hours, Do not crush, chew, or split.    fluticasone (Flonase) 50 mcg/actuation nasal spray 2 sprays, Each Nostril, 2 times daily PRN, Shake gently. Before first use, prime pump. After use, clean tip and replace cap.    hydrocodone-homatropine (Hycodan) 5-1.5 mg/5 mL syrup 5 mL, oral, Every 6 hours PRN    ipratropium (Atrovent) 21 mcg (0.03 %) nasal spray 1 spray, Each Nostril, 3 times daily PRN    loratadine (CLARITIN) 10 mg, Daily    metoprolol succinate XL (Toprol-XL) 100 mg 24 hr tablet Take 1 tablet (100 mg) by mouth. Take 1 tab in the morning, half tablet in the evening.    rosuvastatin (CRESTOR) 20 mg, oral, Nightly    traZODone (DESYREL) 25 mg, oral, Nightly PRN        Last Recorded Vitals:  Vitals:    05/05/25 1409   BP: 173/73   Pulse: 73   SpO2: 93%   Weight: 74.5 kg (164 lb 3.9 oz)         Physical Exam:  Constitutional:       Appearance: Healthy appearance. Not in distress.   Neck:      Vascular: No JVR. JVD normal.   Pulmonary:      Effort: Pulmonary effort is normal.      Breath sounds: Normal breath sounds. No wheezing. No rhonchi. No rales.   Chest:      Chest wall: Not tender to palpatation.   Cardiovascular:      Normal rate. Regular rhythm.      Murmurs: There is no murmur.   Edema:     Peripheral edema absent.   Abdominal:      General: Bowel sounds are normal.      Palpations: Abdomen is soft.      Tenderness: There is no abdominal tenderness.   Musculoskeletal: Normal range of motion. Skin:     General: Skin  is warm and dry.   Neurological:      General: No focal deficit present.      Mental Status: Alert and oriented to person, place and time.           Last Labs:  CBC -  Lab Results   Component Value Date    WBC 5.9 05/04/2025    HGB 16.1 05/04/2025    HCT 47.4 05/04/2025    MCV 95 05/04/2025     (L) 05/04/2025       CMP -  Lab Results   Component Value Date    CALCIUM 8.5 (L) 05/04/2025    PHOS 3.0 05/04/2025    PROT 6.6 05/01/2025    ALBUMIN 3.2 (L) 05/04/2025    AST 18 05/01/2025    ALT 23 05/01/2025    ALKPHOS 46 05/01/2025    BILITOT 1.0 05/01/2025       LIPID PANEL -   Lab Results   Component Value Date    CHOL 186 11/19/2024    TRIG 193 (H) 11/19/2024    HDL 45.0 11/19/2024    CHHDL 4.1 11/19/2024    LDLF 126 (H) 03/12/2021    VLDL 39 11/19/2024    NHDL 141 11/19/2024       RENAL FUNCTION PANEL -   Lab Results   Component Value Date    GLUCOSE 113 (H) 05/04/2025     05/04/2025    K 3.9 05/04/2025     05/04/2025    CO2 29 05/04/2025    ANIONGAP 11 05/04/2025    BUN 14 05/04/2025    CREATININE 1.03 05/04/2025    GFRMALE 70 08/08/2023    CALCIUM 8.5 (L) 05/04/2025    PHOS 3.0 05/04/2025    ALBUMIN 3.2 (L) 05/04/2025        Lab Results   Component Value Date     (H) 05/01/2025    HGBA1C 5.2 11/20/2024       Last Cardiology Tests:  EKG Independently reviewed from today showed sinus rhythm with PACs RBBB heart rate 76 bpm     Echo 11/2024:   1. The left ventricular systolic function is normal, with a visually estimated ejection fraction of 65-70%.   2. Spectral Doppler shows a Grade II (pseudonormal pattern) of left ventricular diastolic filling.   3. There is moderate concentric left ventricular hypertrophy.   4. There is normal right ventricular global systolic function.   5. The left atrium is mildly dilated.   6. Aortic valve sclerosis.   7. Mild aortic valve regurgitation.   8. There is mild dilatation of the ascending aorta.       Assessment/Plan   Very pleasant 84 year-old gentleman  with history of CVA, CAD s/p mini CABG x1 in 1997 (LIMA to LAD), HTN, HLD, PAF (not on OAC), former smoker, TAA (4.4cm) and atrial fibrillation (Eliquis), he is here for a hospital follow up. He was admitted with pneumonia. At home heart rate was in the 30's, EKG today shows a sinus rhythm with PACs. Denies syncopal episodes. He will have a monitor to assess for arrhythmia. Heart rate and blood pressure are well controlled today.     Plan:  - Continue current aspirin 81 mg daily, apixaban 5 mg twice  a day, rosuvastatin 20 mg daily, and Toprol- mg daily   -heart monitor for two weeks   -will call to review the results         NUNO Tomas-CNP         [1]   Family History  Problem Relation Name Age of Onset    Heart disease Mother      Heart disease Father      Allergies Parent      Heart disease Parent

## 2025-05-05 NOTE — PROGRESS NOTES
Discharge Facility: [facility]  Discharge Diagnosis: Pneumonia  Admission Date: 1 May 25  Discharge Date: 4 May 25    PCP Appointment Date: Pt declined  Specialist Appointment Date: 5 May 25 (Cardiology)  Hospital Encounter and Summary Linked: Yes  ED to Hosp-Admission (Discharged) with Tk Parnell DO; Christofer Hodgson DO; Brandt Carbajal MD (05/01/2025)     See discharge assessment below for further details     Wrap Up  Wrap Up Additional Comments: Pt stating he was doing well since his discharge. pt states today his HR dropped to low to mid 30s. pt was able to get a hold of Cardiology who is bringing him in later on today for an EKG and going from there. pt denies any light headedness or difficulty with breathing. pt states he only feels tired so he feels comfortably waiting to go see Cardiology for an evaluation and not have paramedics called. pt was able to obtain all new medications on discharge but has not started taking any as of yet due to drop in HR. pt has no questions in regards to medications or discharge instructions. pt not wishing to change appt date to see PCP to a sooner date at this time. (5/5/2025 11:54 AM)  Call End Time: 1155 (5/5/2025 11:54 AM)    Engagement  Call Start Time: 1145 (Spoke with patient) (5/5/2025 11:54 AM)    Medications  Medications reviewed with patient/caregiver?: Yes (5/5/2025 11:54 AM)  Is the patient having any side effects they believe may be caused by any medication additions or changes?: No (5/5/2025 11:54 AM)  Does the patient have all medications ordered at discharge?: Yes (5/5/2025 11:54 AM)  Prescription Comments: pt able to obtain all medications without difficulty (5/5/2025 11:54 AM)  Is the patient taking all medications as directed (includes completed medication regime)?: Yes (5/5/2025 11:54 AM)  Medication Comments: pt has no questions regarding medications at this time. (5/5/2025 11:54 AM)    Appointments  Does the patient have a primary care provider?: Yes  (pt declined. wishing to keep already scheduled appt for 21 May 25) (5/5/2025 11:54 AM)  Has the patient kept scheduled appointments due by today?: Yes (5/5/2025 11:54 AM)    Self Management  What is the home health agency?: None (5/5/2025 11:54 AM)  Has home health visited the patient within 72 hours of discharge?: Not applicable (5/5/2025 11:54 AM)  What Durable Medical Equipment (DME) was ordered?: None (5/5/2025 11:54 AM)    Patient Teaching  Does the patient have access to their discharge instructions?: Yes (5/5/2025 11:54 AM)  Care Management Interventions: Reviewed instructions with patient (5/5/2025 11:54 AM)  What is the patient's perception of their health status since discharge?: New symptoms unrelated to diagnosis (drop in HR into low and mid 30s. pt set to see Cardiology later on today for EKG. pt comfortably waiting for this appt and opting not to be evaluated at ED as of yet.) (5/5/2025 11:54 AM)  Is the patient/caregiver able to teach back the hierarchy of who to call/visit for symptoms/problems? PCP, Specialist, Home Health nurse, Urgent Care, ED, 911: Yes (5/5/2025 11:54 AM)  Patient/Caregiver Education Comments: None (5/5/2025 11:54 AM)

## 2025-05-06 LAB
BACTERIA BLD CULT: NORMAL
BACTERIA BLD CULT: NORMAL
CHLAMYDIA.PNEUMONIAE PCR, VIRC: NOT DETECTED
MYCOPLASMA.PNEUMONIAE PCR, VIRC: NOT DETECTED

## 2025-05-07 ENCOUNTER — APPOINTMENT (OUTPATIENT)
Dept: BEHAVIORAL HEALTH | Facility: CLINIC | Age: 85
End: 2025-05-07
Payer: MEDICARE

## 2025-05-08 NOTE — SIGNIFICANT EVENT
Follow Up Phone Call    Outgoing phone call    Spoke to: Lv Quinteros Relationship:self   Phone number: [phone]      Outcome: contacted patient/ family   Chief Complaint   Patient presents with    Cough    Fever    Weakness, Gen          Diagnosis:Not applicable    States he is feeling better. No further questions or concerns.

## 2025-05-09 LAB
ATRIAL RATE: 84 BPM
P AXIS: 19 DEGREES
P OFFSET: 183 MS
P ONSET: 93 MS
PR INTERVAL: 234 MS
Q ONSET: 210 MS
QRS COUNT: 14 BEATS
QRS DURATION: 142 MS
QT INTERVAL: 384 MS
QTC CALCULATION(BAZETT): 453 MS
QTC FREDERICIA: 429 MS
R AXIS: -68 DEGREES
T AXIS: 7 DEGREES
T OFFSET: 402 MS
VENTRICULAR RATE: 84 BPM

## 2025-05-15 LAB
ATRIAL RATE: 76 BPM
ATRIAL RATE: 84 BPM
P AXIS: 19 DEGREES
P AXIS: 32 DEGREES
P OFFSET: 165 MS
P OFFSET: 183 MS
P ONSET: 113 MS
P ONSET: 93 MS
PR INTERVAL: 192 MS
PR INTERVAL: 234 MS
Q ONSET: 209 MS
Q ONSET: 210 MS
QRS COUNT: 12 BEATS
QRS COUNT: 14 BEATS
QRS DURATION: 140 MS
QRS DURATION: 142 MS
QT INTERVAL: 384 MS
QT INTERVAL: 412 MS
QTC CALCULATION(BAZETT): 453 MS
QTC CALCULATION(BAZETT): 463 MS
QTC FREDERICIA: 429 MS
QTC FREDERICIA: 445 MS
R AXIS: -64 DEGREES
R AXIS: -68 DEGREES
T AXIS: 24 DEGREES
T AXIS: 7 DEGREES
T OFFSET: 402 MS
T OFFSET: 415 MS
VENTRICULAR RATE: 76 BPM
VENTRICULAR RATE: 84 BPM

## 2025-05-20 ENCOUNTER — DOCUMENTATION (OUTPATIENT)
Dept: PRIMARY CARE | Facility: CLINIC | Age: 85
End: 2025-05-20
Payer: MEDICARE

## 2025-05-28 ENCOUNTER — APPOINTMENT (OUTPATIENT)
Dept: PRIMARY CARE | Facility: CLINIC | Age: 85
End: 2025-05-28
Payer: MEDICARE

## 2025-05-28 VITALS
HEART RATE: 60 BPM | HEIGHT: 70 IN | DIASTOLIC BLOOD PRESSURE: 68 MMHG | BODY MASS INDEX: 23.48 KG/M2 | TEMPERATURE: 97.6 F | OXYGEN SATURATION: 93 % | SYSTOLIC BLOOD PRESSURE: 128 MMHG | WEIGHT: 164 LBS

## 2025-05-28 DIAGNOSIS — I10 ESSENTIAL (PRIMARY) HYPERTENSION: ICD-10-CM

## 2025-05-28 DIAGNOSIS — M45.9 ANKYLOSING SPONDYLITIS, UNSPECIFIED SITE OF SPINE (MULTI): ICD-10-CM

## 2025-05-28 DIAGNOSIS — I69.354 HEMIPLEGIA AND HEMIPARESIS FOLLOWING CEREBRAL INFARCTION AFFECTING LEFT NON-DOMINANT SIDE (MULTI): ICD-10-CM

## 2025-05-28 DIAGNOSIS — D69.49 OTHER PRIMARY THROMBOCYTOPENIA: ICD-10-CM

## 2025-05-28 DIAGNOSIS — I25.810 CORONARY ARTERY DISEASE INVOLVING CORONARY BYPASS GRAFT OF NATIVE HEART WITHOUT ANGINA PECTORIS: ICD-10-CM

## 2025-05-28 DIAGNOSIS — E78.00 HYPERCHOLESTEROLEMIA: Primary | ICD-10-CM

## 2025-05-28 PROCEDURE — G2211 COMPLEX E/M VISIT ADD ON: HCPCS | Performed by: FAMILY MEDICINE

## 2025-05-28 PROCEDURE — 99214 OFFICE O/P EST MOD 30 MIN: CPT | Performed by: FAMILY MEDICINE

## 2025-05-28 PROCEDURE — 1036F TOBACCO NON-USER: CPT | Performed by: FAMILY MEDICINE

## 2025-05-28 PROCEDURE — 1159F MED LIST DOCD IN RCRD: CPT | Performed by: FAMILY MEDICINE

## 2025-05-28 PROCEDURE — 1111F DSCHRG MED/CURRENT MED MERGE: CPT | Performed by: FAMILY MEDICINE

## 2025-05-28 PROCEDURE — 1160F RVW MEDS BY RX/DR IN RCRD: CPT | Performed by: FAMILY MEDICINE

## 2025-05-28 PROCEDURE — 1123F ACP DISCUSS/DSCN MKR DOCD: CPT | Performed by: FAMILY MEDICINE

## 2025-05-28 PROCEDURE — 1170F FXNL STATUS ASSESSED: CPT | Performed by: FAMILY MEDICINE

## 2025-05-28 PROCEDURE — 3074F SYST BP LT 130 MM HG: CPT | Performed by: FAMILY MEDICINE

## 2025-05-28 PROCEDURE — 3078F DIAST BP <80 MM HG: CPT | Performed by: FAMILY MEDICINE

## 2025-05-28 RX ORDER — ROSUVASTATIN CALCIUM 5 MG/1
5 TABLET, COATED ORAL NIGHTLY
Qty: 100 TABLET | Refills: 3 | Status: SHIPPED | OUTPATIENT
Start: 2025-05-28

## 2025-05-28 ASSESSMENT — ACTIVITIES OF DAILY LIVING (ADL)
GROCERY_SHOPPING: INDEPENDENT
DRESSING: INDEPENDENT
DOING_HOUSEWORK: INDEPENDENT
TAKING_MEDICATION: INDEPENDENT
MANAGING_FINANCES: INDEPENDENT
BATHING: INDEPENDENT

## 2025-05-28 ASSESSMENT — PATIENT HEALTH QUESTIONNAIRE - PHQ9
SUM OF ALL RESPONSES TO PHQ9 QUESTIONS 1 AND 2: 0
2. FEELING DOWN, DEPRESSED OR HOPELESS: NOT AT ALL
1. LITTLE INTEREST OR PLEASURE IN DOING THINGS: NOT AT ALL

## 2025-05-28 ASSESSMENT — ENCOUNTER SYMPTOMS
DEPRESSION: 0
OCCASIONAL FEELINGS OF UNSTEADINESS: 0
LOSS OF SENSATION IN FEET: 0

## 2025-05-28 NOTE — PROGRESS NOTES
"Subjective   Reason for Visit: Lv Quinteros is an 84 y.o. male here for Medicare Annual Wellness Visit Subsequent (Lv is here today for medicare annual wellness visit. )     Past Medical, Surgical, and Family History reviewed and updated in chart.    Reviewed all medications by prescribing practitioner or clinical pharmacist (such as prescriptions, OTCs, herbal therapies and supplements) and documented in the medical record.    HPI  Historian(s): Self    Generally feeling well.  Patient does not use opioids.    Had stopped Rosuvastatin about 6 months ago.    Family History[1]    Patient Care Team:  Chad Buchanan DO as PCP - General (Family Medicine)  Chad Buchanan DO as PCP - [facility] Attributed Provider  GRETCHEN Horner as Nurse Practitioner (Dermatology)  GRETCHEN Valderrama as Nurse Practitioner (Gastroenterology)  Gamaliel Ortiz RN as Care Manager (Case Management)  Kiel Devi MD as Consulting Physician (Cardiology)     Review of Systems   All other systems reviewed and are negative.      Current Outpatient Medications   Medication Instructions    apixaban (ELIQUIS) 5 mg, oral, 2 times daily    benzonatate (TESSALON) 100 mg, oral, 3 times daily PRN, Do not crush or chew.    ipratropium (Atrovent) 21 mcg (0.03 %) nasal spray 1 spray, Each Nostril, 3 times daily PRN    loratadine (CLARITIN) 10 mg, Daily    metoprolol succinate XL (Toprol-XL) 100 mg 24 hr tablet Take 1 tablet (100 mg) by mouth. Take 1 tab in the morning, half tablet in the evening.    rosuvastatin (CRESTOR) 5 mg, oral, Nightly    traZODone (DESYREL) 25 mg, oral, Nightly PRN       Objective   Vitals:  /68 (BP Location: Right arm, Patient Position: Sitting)   Pulse 60   Temp 36.4 °C (97.6 °F) (Temporal)   Ht 1.778 m (5' 10\")   Wt 74.4 kg (164 lb)   SpO2 93%   BMI 23.53 kg/m²       Lab Results   Component Value Date    HGBA1C 5.2 11/20/2024    HGBA1C 5.1 10/17/2023    HGBA1C 5.0 05/31/2022    HGBA1C " 5.5 03/12/2021    HGBA1C 5.2 12/11/2020     Lab Results   Component Value Date    MICROALBCREA  10/17/2023      Comment:      One or more analytes used in this calculation is outside of the analytical measurement range. Calculation cannot be performed.      Lab Results   Component Value Date    LDLCALC 102 (H) 11/19/2024    LDLCALC 118 (H) 10/17/2023    LDLF 126 (H) 03/12/2021    LDLF 124 (H) 12/11/2020    LDLF 136 (H) 12/12/2019     Lab Results   Component Value Date    TRIG 193 (H) 11/19/2024    TRIG 116 10/17/2023    TRIG 138 03/12/2021      Lab Results   Component Value Date    CREATININE 1.03 05/04/2025    CREATININE 0.97 05/03/2025    CREATININE 0.88 05/02/2025    CREATININE 0.82 05/01/2025    CREATININE 0.96 11/20/2024    CREATININE 0.85 11/19/2024    CREATININE 0.80 05/20/2024    CREATININE 0.81 04/14/2024    CREATININE 1.05 04/13/2024    CREATININE 1.06 08/08/2023     Lab Results   Component Value Date    EGFR 72 05/04/2025    EGFR 77 05/03/2025    EGFR 85 05/02/2025    EGFR 87 05/01/2025    EGFR 78 11/20/2024    EGFR 86 11/19/2024    EGFR 88 05/20/2024    EGFR 88 04/14/2024    EGFR 71 04/13/2024    GFRMALE 70 08/08/2023      Lab Results   Component Value Date    TSH 1.35 11/19/2024    TSH 1.47 11/19/2024    TSH 1.39 10/17/2023    TSH 1.62 12/12/2019      Lab Results   Component Value Date    PSASCREEN 2.69 12/11/2020         Physical Exam  Vitals and nursing note reviewed.   Constitutional:       General: He is not in acute distress.     Appearance: Normal appearance. He is not diaphoretic.      Comments: No assistive device presently being used.   HENT:      Head: Normocephalic and atraumatic.   Eyes:      General: No scleral icterus.     Extraocular Movements: Extraocular movements intact.      Conjunctiva/sclera: Conjunctivae normal.   Cardiovascular:      Rate and Rhythm: Normal rate and regular rhythm.      Heart sounds: Murmur heard.   Pulmonary:      Effort: Pulmonary effort is normal. No  respiratory distress.      Breath sounds: Normal breath sounds. No wheezing, rhonchi or rales.   Musculoskeletal:      Right lower leg: Edema (trace pitting) present.      Left lower leg: Edema (trace pitting) present.   Skin:     General: Skin is warm and dry.      Coloration: Skin is not jaundiced.   Neurological:      General: No focal deficit present.      Mental Status: He is alert and oriented to person, place, and time. Mental status is at baseline.   Psychiatric:         Mood and Affect: Mood normal.         Behavior: Behavior normal.         Thought Content: Thought content normal.         Assessment & Plan  Hypercholesterolemia  Restart Rosuvastatin. Declines 20 mg, wants to start at lowest dose. May consider checking CPK earlier, if muscle weakness or pain.  Orders:    Creatine Kinase; Future    Comprehensive Metabolic Panel; Future    Creatine Kinase; Future    Lipid Panel; Future    Follow Up In Primary Care; Future    rosuvastatin (Crestor) 5 mg tablet; Take 1 tablet (5 mg) by mouth once daily at bedtime.    Essential (primary) hypertension  Well controlled.        Other primary thrombocytopenia  Mild, chronic, stable.       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       Ankylosing spondylitis, unspecified site of spine (Multi)  Stable.       Coronary artery disease involving coronary bypass graft of native heart without angina pectoris    Orders:    rosuvastatin (Crestor) 5 mg tablet; Take 1 tablet (5 mg) by mouth once daily at bedtime.            Orders Placed This Encounter   Procedures    Creatine Kinase     Standing Status:   Future     Number of Occurrences:   1     Expected Date:   5/28/2025     Expiration Date:   11/28/2025     Release result to [employer]:   Immediate    Comprehensive Metabolic Panel     Standing Status:   Future     Expected Date:   8/28/2025     Expiration Date:   11/28/2025     Release result to [employer]:    Immediate [1]    Creatine Kinase     Standing Status:   Future     Expected Date:   8/28/2025     Expiration Date:   11/28/2025     Release result to [employer]:   Immediate [1]    Lipid Panel     Standing Status:   Future     Expected Date:   8/28/2025     Expiration Date:   11/28/2025     Release result to [employer]:   Immediate [1]               [1]   Family History  Problem Relation Name Age of Onset    Heart disease Mother      Heart disease Father      Allergies Parent      Heart disease Parent

## 2025-05-28 NOTE — ASSESSMENT & PLAN NOTE
Restart Rosuvastatin. Declines 20 mg, wants to start at lowest dose. May consider checking CPK earlier, if muscle weakness or pain.  Orders:    Creatine Kinase; Future    Comprehensive Metabolic Panel; Future    Creatine Kinase; Future    Lipid Panel; Future    Follow Up In Primary Care; Future    rosuvastatin (Crestor) 5 mg tablet; Take 1 tablet (5 mg) by mouth once daily at bedtime.

## 2025-05-28 NOTE — PATIENT INSTRUCTIONS
"  Please return for a(n) cholesterol and medication follow-up appointment in 3 months, after tests to review results and options, earlier if any question or concern. Please return for your next Wellness visit in 12 months. Please schedule additional problem-focused appointment(s) to address additional problem(s). Simply mentioning or talking briefly about a problem or concern does not necessarily mean it is currently being addressed. Time constraints dictate that not every problem/concern can always be addressed.    Recommended vaccines:  Influenza, annual  Prevnar-20 \"pneumonia\" vaccine  Respiratory Syncytial Virus (RSV)  Shingrix (shingles) vaccine series  TDaP (tetanus-diphtheria-pertussis) vaccine  Avoid taking Biotin (a vitamin, shows up particularly in hair/nail supplements) for a week prior to any blood tests, as it can interfere with certain results. Fasting for labs means 12 hours, nothing to eat or drink, except water and medications, unless directed otherwise.    For assistance with scheduling referrals or consultations, please call [phone]. For laboratory, radiology, and other tests, please call [phone] ([phone] for pediatrics). Please review prescription inserts and published information for possible adverse effects of all medications. Return after testing or consultation to review results and recommendations, if symptoms persist, change, worsen, or return, or if you have any question or concern. If you do not get results within 7-10 days, or you have any question or concern, please send a message, call the office ([phone]), or return to the office for a follow-up appointment. For non-emergencies, you may call the office. For emergencies, call [phone] or go to the nearest Emergency Department. Please schedule additional appointment(s) to address concern(s) not addressed today. An annual Wellness visit is strongly recommended. A Wellness visit should be dedicated to addressing routine " health maintenance matters (e.g., cancer screenings, cardiovascular screening, etc.). Problem-focused visits, typically prompted by symptoms or specific concerns, are usually conducted separately, particularly if multiple or complex problems need to be addressed.    In general, results are not released or discussed over the telephone, but at an appointment or via  [employer]. Results of tests done through [facility] are released via  [employer] (see below).  [email or web address]   [employer] support line: [phone]

## 2025-06-02 ENCOUNTER — DOCUMENTATION (OUTPATIENT)
Dept: PRIMARY CARE | Facility: CLINIC | Age: 85
End: 2025-06-02
Payer: MEDICARE

## 2025-06-05 DIAGNOSIS — G47.00 INSOMNIA, UNSPECIFIED TYPE: ICD-10-CM

## 2025-06-05 RX ORDER — TRAZODONE HYDROCHLORIDE 50 MG/1
25 TABLET ORAL NIGHTLY PRN
Qty: 15 TABLET | Refills: 3 | Status: SHIPPED | OUTPATIENT
Start: 2025-06-05

## 2025-07-02 ENCOUNTER — APPOINTMENT (OUTPATIENT)
Dept: OTOLARYNGOLOGY | Facility: CLINIC | Age: 85
End: 2025-07-02
Payer: MEDICARE

## 2025-07-02 VITALS — WEIGHT: 166 LBS | BODY MASS INDEX: 23.77 KG/M2 | TEMPERATURE: 96.2 F | HEIGHT: 70 IN

## 2025-07-02 DIAGNOSIS — R49.9 HOARSENESS OR CHANGING VOICE: ICD-10-CM

## 2025-07-02 DIAGNOSIS — L29.9 EAR ITCHING: Primary | ICD-10-CM

## 2025-07-02 DIAGNOSIS — H60.543 DERMATITIS OF BOTH EAR CANALS: ICD-10-CM

## 2025-07-02 DIAGNOSIS — R09.81 NASAL CONGESTION: ICD-10-CM

## 2025-07-02 PROCEDURE — 99204 OFFICE O/P NEW MOD 45 MIN: CPT

## 2025-07-02 PROCEDURE — 31575 DIAGNOSTIC LARYNGOSCOPY: CPT

## 2025-07-02 PROCEDURE — 1159F MED LIST DOCD IN RCRD: CPT

## 2025-07-02 PROCEDURE — 1036F TOBACCO NON-USER: CPT

## 2025-07-02 RX ORDER — FLUTICASONE PROPIONATE 50 MCG
SPRAY, SUSPENSION (ML) NASAL
Qty: 1 ML | Refills: 11 | Status: SHIPPED | OUTPATIENT
Start: 2025-07-02

## 2025-07-02 RX ORDER — FLUOCINOLONE ACETONIDE 0.11 MG/ML
OIL AURICULAR (OTIC)
Qty: 1 ML | Refills: 11 | Status: SHIPPED | OUTPATIENT
Start: 2025-07-02

## 2025-07-02 NOTE — PROGRESS NOTES
"Chief Complaint   Patient presents with    New Patient Visit     N/P  feels like he has a cold since having a \"sinus push\" at the dentist , harder to talk since having a stroke     HPI:  Lv Quinteros is a 84 y.o. male reports feeling like he has had a cold for about a year since having a sinus push at the dentist office.  This includes nasal congestion, runny nose and some sneezing.  He does have prescriptions for Flonase and Atrovent which he reports he has not been using.  Denies pressure or pain.  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  Reports his right ear has been sore but not painful recently.  Admits to bilateral itching and dry flaky skin of the earlobes.    PMH:  Medical History[1]  Surgical History[2]      Medications:   Current Medications[3]     Allergies:  Allergies[4]     ROS:  Review of systems normal unless stated otherwise in the HPI and/or PMH.    Physical Exam:  Temperature 35.7 °C (96.2 °F), height 1.778 m (5' 10\"), weight 75.3 kg (166 lb). Body mass index is 23.82 kg/m².     GENERAL APPEARANCE: Well developed and well nourished.  Alert and oriented in no acute distress.  Normal vocal quality.      HEAD/FACE: No erythema or edema or facial tenderness.  Normal facial nerve function bilaterally.    EAR:       EXTERNAL: Right external auditory canal noted to have cerumen impaction which caused obstruction of visualization of the tympanic membrane.  This was removed via procedure under direct microscopic visualization using instrumentation without any difficulty or complication.  Pinnas with dry, crusting and flaky skin bilaterally and external auditory canal are red with flaky skin no lesion or obstructing wax on the left.       MIDDLE EAR: Tympanic membranes intact and mobile with normal landmarks.  Middle ear space appears well aerated.       TUBE STATUS: N/A       MASTOID CAVITY: N/A       " HEARING: Gross hearing assessment is within normal limits.      NOSE:       VISUALIZED USING: Anterior rhinoscopy with headlight and nasal speculum.       DORSUM: Midline, nontraumatic appearance.       MUCOSA: Normal-appearing.       SECRETIONS: Normal.       SEPTUM: Midline and nonobstructing.       INFERIOR TURBINATES: Normal.       MIDDLE TURBINATES/MEATUS: N/A       BLEEDING: N/A         ORAL CAVITY/PHARYNX:       TEETH: Adequate dentition.       TONGUE: No mass or lesion.  Normal mobility.       FLOOR OF MOUTH: No mass or lesion.       PALATE: Normal hard palate, soft palate, and uvula.       OROPHARYNX: Normal without mass or lesion.       BUCCAL MUCOSA/GBS: Normal without mass or lesion.       LIPS: Normal.    LARYNX/HYPOPHARYNX/NASOPHARYNX: Flexible laryngoscopy was performed after verbal consent obtained. Applied decongestant and topical anesthetic. The flexible laryngoscopy was placed through the nasal cavity revealing normal nasopharynx, normal oropharynx, normal hypopharynx and normal larynx. Normal bilateral vocal cord mobility without any mucosal masses or lesions.    NECK: No palpable masses or abnormal adenopathy.  Trachea is midline.    THYROID: No thyromegaly or palpable nodule.    SALIVARY GLANDS: Normal bilateral parotid and submandibular glands by inspection and palpation.    TMJ's: Normal.    NEURO: Cranial nerve exam grossly normal bilaterally.       Assessment/Plan   Lv was seen today for new patient visit.  Diagnoses and all orders for this visit:  Ear itching (Primary)  -     fluocinolone (DermOtic) 0.01 % ear drops; 4 drops to the affected ear/s twice daily as needed for itching. 1 bottle. 11 refills.  Dermatitis of both ear canals  -     fluocinolone (DermOtic) 0.01 % ear drops; 4 drops to the affected ear/s twice daily as needed for itching. 1 bottle. 11 refills.  Hoarseness or changing voice  -     Referral to Speech Therapy; Future  Nasal congestion  -     fluticasone (Flonase)  50 mcg/actuation nasal spray; 2 sprays each nostril once daily, 1 bottle, 11 refills  Dr. Campoverde was available for laryngoscopy, reassurance was provided normal exam.  Recommended speech therapy.  Lv will Flonase regularly and his current prescription of Atrovent as needed.  I recommended cleansing bilateral pannus with dandruff shampoo and ordered Derm otic for EACs.  Follow up in about 8 weeks (around 8/27/2025).     Zamzam Recio, NUNO-INGRID         [1]   Past Medical History:  Diagnosis Date    Actinic keratosis 05/09/2023    Ankylosing spondylitis     Arrhythmia     PAF    Arthritis     Cholelithiasis without obstruction 02/25/2020    Coronary artery disease     Cough 10/04/2023    COVID-19 10/04/2023    Erythema intertrigo 05/09/2023    High prostate specific antigen (PSA) 08/21/2019    Hypertension     Inflamed seborrheic keratosis 05/09/2023    Melanocytic nevi of unspecified upper limb, including shoulder 05/09/2023    Sore throat 10/04/2023    Stroke (Multi)     approx 3 yrs ago   [2]   Past Surgical History:  Procedure Laterality Date    CATARACT EXTRACTION      COLONOSCOPY      CORONARY ARTERY BYPASS GRAFT      x1  20 yrs ago at [facility]    TONSILLECTOMY     [3]   Current Outpatient Medications:     apixaban (Eliquis) 5 mg tablet, Take 1 tablet (5 mg) by mouth 2 times a day., Disp: 60 tablet, Rfl: 11    loratadine (Claritin) 10 mg tablet, Take 1 tablet (10 mg) by mouth once daily., Disp: , Rfl:     metoprolol succinate XL (Toprol-XL) 100 mg 24 hr tablet, Take 1 tablet (100 mg) by mouth. Take 1 tab in the morning, half tablet in the evening., Disp: , Rfl:     traZODone (Desyrel) 50 mg tablet, Take 0.5 tablets (25 mg) by mouth as needed at bedtime for sleep., Disp: 15 tablet, Rfl: 3    benzonatate (Tessalon) 100 mg capsule, Take 1 capsule (100 mg) by mouth 3 times a day as needed for cough. Do not crush or chew. (Patient not taking: Reported on 7/2/2025), Disp: 30 capsule, Rfl: 0    fluocinolone (DermOtic)  0.01 % ear drops, 4 drops to the affected ear/s twice daily as needed for itching. 1 bottle. 11 refills., Disp: 1 mL, Rfl: 11    fluticasone (Flonase) 50 mcg/actuation nasal spray, 2 sprays each nostril once daily, 1 bottle, 11 refills, Disp: 1 mL, Rfl: 11    ipratropium (Atrovent) 21 mcg (0.03 %) nasal spray, Administer 1 spray into each nostril 3 times a day as needed for rhinitis (Per patient request if does not want Flonase). (Patient not taking: Reported on 7/2/2025), Disp: 15 mL, Rfl: 0    rosuvastatin (Crestor) 5 mg tablet, Take 1 tablet (5 mg) by mouth once daily at bedtime., Disp: 100 tablet, Rfl: 3  [4]   Allergies  Allergen Reactions    Penicillins Hives, Rash, Shortness of breath and Unknown    Other Other     flushing, near-syncope    Statins-Hmg-Coa Reductase Inhibitors Unknown     muscle weakness on Lipitor; crestor ok

## 2025-07-21 ENCOUNTER — APPOINTMENT (OUTPATIENT)
Dept: PRIMARY CARE | Facility: CLINIC | Age: 85
End: 2025-07-21
Payer: MEDICARE

## 2025-07-25 ENCOUNTER — EVALUATION (OUTPATIENT)
Dept: SPEECH THERAPY | Facility: CLINIC | Age: 85
End: 2025-07-25
Payer: MEDICARE

## 2025-07-25 DIAGNOSIS — I10 ESSENTIAL (PRIMARY) HYPERTENSION: Primary | ICD-10-CM

## 2025-07-25 DIAGNOSIS — R49.0 DYSPHONIA: ICD-10-CM

## 2025-07-25 DIAGNOSIS — R49.9 VOICE AND RESONANCE DEFECT: Primary | ICD-10-CM

## 2025-07-25 PROCEDURE — 92523 SPEECH SOUND LANG COMPREHEN: CPT | Mod: GN

## 2025-07-25 PROCEDURE — 92507 TX SP LANG VOICE COMM INDIV: CPT | Mod: GN

## 2025-07-25 RX ORDER — METOPROLOL SUCCINATE 100 MG/1
TABLET, EXTENDED RELEASE ORAL
Qty: 135 TABLET | Refills: 0 | Status: SHIPPED | OUTPATIENT
Start: 2025-07-25

## 2025-07-25 NOTE — PROGRESS NOTES
Speech-Language Pathology - Speech evlauation    Patient Name: Lv Quinteros  MRN: [id number]  : 1940  Today's Date: 2025  Time Calculation  Start Time: 1055  Stop Time: 1145  Time Calculation (min): 50 min  Visit Number: 1    ASSESSMENT  Impressions:  Pt presents with mildly impaired speech intelligibility due to dysarthria and mildly impaired vocal quality with hyper-nasal quality in voice as reported by the patient.    Skilled speech therapy is medically necessary and ordered by a physician/SANDI in order to maximize speech intelligibility and improve communicative quality of life. Without skilled speech therapy, pt is at risk for decreased communication quality of life. .    PLAN    Recommended frequency/duration:  Skilled SLP services recommended: Yes  Frequency: 1 time every other week  Duration: 8 weeks     Strengths: Cognition, Motivation, and Anticipated fair or good medical prognosis  Barriers to participation in tx: N/A    Payor authorization information:  Payor: MEDICARE / Plan: MEDICARE PART A AND B / Product Type: *No Product type* /     Goals:  Short-Term Goals:  In 4 weeks...  Pt will improve speech intelligibility to 100% given no cues for use of intelligibility strategies.   GOAL START: 2025      Status: Goal initiated   Progress this date: review instructions and recommendations for exaggerated articulation for maximal speech intelligibility. Further, discussion of situations to improve speech including reduction of distractions, upright posture. Good comprehension verbalized re: same.     In 4 weeks....  Pt will complete voice/ resonance exercises with not strained quality of voice with min assist and 90% accuracy   GOAL START: 25   Status: goal initiated.   Progress: instruction re flow phonation, relaxed/ not strained oral and vocal musculature. Pt able to imitate relaxed, flow phonation with single syllables with mod assist and 70% accuracy overall.    Long-Term Goals:  In  "8 weeks...  Pt will verbalize in conversation with intelligibility and vocal quality within functional limits.    GOAL START: 7/25/2025    GOAL END: 9/25/25   Status: Goal initiated  Pt will demonstrate improved vocal quality as reported by improved score on VHI-10 with number below 2.    GOAL START: 7/25/25     GOAL END 9/25/25   Status: goal initiated.     SUBJECTIVE    PMHx relevant to rehab:   Lv Quinteros is a 84 y.o. male reports feeling like he has had a cold for about a year since having a sinus push at the dentist office.  This includes nasal congestion, runny nose and some sneezing.  He does have prescriptions for Flonase and Atrovent which he reports he has not been using.  Denies pressure or pain.  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  Reports his right ear has been sore but not painful recently.  Admits to bilateral itching and dry flaky skin of the earlobes.   Chief complaint:   \"My voice sounds nasal\"  Relevant imaging results- ENT report from visit 7/2/25:    EAR:       EXTERNAL: Right external auditory canal noted to have cerumen impaction which caused obstruction of visualization of the tympanic membrane.  This was removed via procedure under direct microscopic visualization using instrumentation without any difficulty or complication.  Pinnas with dry, crusting and flaky skin bilaterally and external auditory canal are red with flaky skin no lesion or obstructing wax on the left.       MIDDLE EAR: Tympanic membranes intact and mobile with normal landmarks.  Middle ear space appears well aerated.       TUBE STATUS: N/A       MASTOID CAVITY: N/A       HEARING: Gross hearing assessment is within normal limits.      NOSE:       VISUALIZED USING: Anterior rhinoscopy with headlight and nasal speculum.       DORSUM: Midline, nontraumatic appearance.       MUCOSA: Normal-appearing.       SECRETIONS: " Normal.       SEPTUM: Midline and nonobstructing.       INFERIOR TURBINATES: Normal.       MIDDLE TURBINATES/MEATUS: N/A       BLEEDING: N/A         ORAL CAVITY/PHARYNX:       TEETH: Adequate dentition.       TONGUE: No mass or lesion.  Normal mobility.       FLOOR OF MOUTH: No mass or lesion.       PALATE: Normal hard palate, soft palate, and uvula.       OROPHARYNX: Normal without mass or lesion.       BUCCAL MUCOSA/GBS: Normal without mass or lesion.       LIPS: Normal.     LARYNX/HYPOPHARYNX/NASOPHARYNX: Flexible laryngoscopy was performed after verbal consent obtained. Applied decongestant and topical anesthetic. The flexible laryngoscopy was placed through the nasal cavity revealing normal nasopharynx, normal oropharynx, normal hypopharynx and normal larynx. Normal bilateral vocal cord mobility without any mucosal masses or lesions.  General Visit Information:  Living Environment: Home     Date of order:  7/2/25      OBJECTIVE    Oral motor:  General appearance: mild left labial droop at rest.   Lingual: mild deviates left on excursion  Labial: mild left weakness  Palatal: wfl    Motor speech:  Intelligibility: mild impairment when rushed/ tired.   Typical utterance length: [facility]  Motor speech characteristics: Consonant distortions, Decreased breath support  Perceptual voice characteristics: nasal    Receptive language:  WFL     Expressive language:  WFL    Cognition:  WFL    Formal Assessments:  The following formal assessments were completed:  VHI-10  My voice makes it difficult for people to hear me.: Almost never  People have difficulty understanding me in a noisy room.: Almost never  My voice difficulties restrict my personal and social life.: Never  I feel left out of the conversations because of my voice: Never  My voice problem causes me to lose income.: Never  I feel as though I have to strain to produce voice.: Sometimes  The clarity of my voice is unpredictable.: Never  My voice problem upsets me.:  "Sometimes  My voice makes me feel handicapped.: Almost never  People ask, \"What's wrong with your voice?\": Never  VHI-10 Total Score: 7    Home Exercise Program:  Exercises reviewed and provided in writing.     Treatment/Education:  Results and recommendations were relayed to: Patient  Education provided: Yes   Learner: Patient   Barriers to learning: None   Method of teaching: Verbal and Written   Topic: role of ST, results of assessment, and recommended speech intelligibility strategies and voice exercises.    Outcome of teaching: Pt/family demonstrated good understanding  Treatment provided: Yes      "

## 2025-07-25 NOTE — LETTER
July 25, 2025    ABHINAV Ramos  [address] [facility] [address]    Patient: Lv Quinteros   YOB: 1940   Date of Visit: 7/25/2025       Dear GRETCHEN Ervin  [address] [address]    The attached plan of care is being sent to you because your patient’s medical reimbursement requires that you certify the plan of care. Your signature is required to allow uninterrupted insurance coverage.      You may indicate your approval by signing below and faxing this form back to us at Dept Fax: [phone].    Please call Dept: [phone] with any questions or concerns.    Thank you for this referral,        ABHINAV Ramos  [place] [address] [facility]  [address] [facility] [address]    Payer: Payor: MEDICARE / Plan: MEDICARE PART A AND B / Product Type: *No Product type* /                                                                         Date:     Dear ABHINAV Ramos,     Re: Mr. Lv Quinteros, MRN:[id number]    I certify that I have reviewed the attached plan of care and it is medically necessary for Mr. Lv Quinteros (1940) who is under my care.          ______________________________________                    _________________  Provider name and credentials                                           Date and time                                                                                           Plan of Care 7/25/25   Effective from: 7/25/2025  Effective to: 11/2/2025    Plan ID: [id number]            Participants as of Finalize on 7/25/2025    Name Type Comments Contact Info    GRETCHEN Ervin Referring Provider  [phone]    ABHINAV Ramos Speech Language Pathologist  [phone]       Last Plan Note     Author: ABHINAV Ramos Status: Incomplete Last edited: 7/25/2025 11:00 AM       Speech-Language Pathology - Speech evlauation    Patient Name: Lv Quinteros  MRN:  [id number]  : 1940  Today's Date: 2025  Time Calculation  Start Time: 1055  Stop Time: 1145  Time Calculation (min): 50 min  Visit Number: 1    ASSESSMENT  Impressions:  Pt presents with mildly impaired speech intelligibility due to dysarthria and mildly impaired vocal quality with hyper-nasal quality in voice as reported by the patient.    Skilled speech therapy is medically necessary and ordered by a physician/SANDI in order to maximize speech intelligibility and improve communicative quality of life. Without skilled speech therapy, pt is at risk for decreased communication quality of life. .    PLAN    Recommended frequency/duration:  Skilled SLP services recommended: Yes  Frequency: 1 time every other week  Duration: 8 weeks     Strengths: Cognition, Motivation, and Anticipated fair or good medical prognosis  Barriers to participation in tx: N/A    Payor authorization information:  Payor: MEDICARE / Plan: MEDICARE PART A AND B / Product Type: *No Product type* /     Goals:  Short-Term Goals:  In 4 weeks...  Pt will improve speech intelligibility to 100% given no cues for use of intelligibility strategies.   GOAL START: 2025      Status: Goal initiated   Progress this date: review instructions and recommendations for exaggerated articulation for maximal speech intelligibility. Further, discussion of situations to improve speech including reduction of distractions, upright posture. Good comprehension verbalized re: same.     In 4 weeks....  Pt will complete voice/ resonance exercises with not strained quality of voice with min assist and 90% accuracy   GOAL START: 25   Status: goal initiated.   Progress: instruction re flow phonation, relaxed/ not strained oral and vocal musculature. Pt able to imitate relaxed, flow phonation with single syllables with mod assist and 70% accuracy overall.    Long-Term Goals:  In 8 weeks...  Pt will verbalize in conversation with intelligibility and vocal quality  "within functional limits.    GOAL START: 7/25/2025    GOAL END: 9/25/25   Status: Goal initiated  Pt will demonstrate improved vocal quality as reported by improved score on VHI-10 with number below 2.    GOAL START: 7/25/25     GOAL END 9/25/25   Status: goal initiated.     SUBJECTIVE    PMHx relevant to rehab:   Lv Quinteros is a 84 y.o. male reports feeling like he has had a cold for about a year since having a sinus push at the dentist office.  This includes nasal congestion, runny nose and some sneezing.  He does have prescriptions for Flonase and Atrovent which he reports he has not been using.  Denies pressure or pain.  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  Reports his right ear has been sore but not painful recently.  Admits to bilateral itching and dry flaky skin of the earlobes.   Chief complaint:   \"My voice sounds nasal\"  Relevant imaging results- ENT report from visit 7/2/25:    EAR:       EXTERNAL: Right external auditory canal noted to have cerumen impaction which caused obstruction of visualization of the tympanic membrane.  This was removed via procedure under direct microscopic visualization using instrumentation without any difficulty or complication.  Pinnas with dry, crusting and flaky skin bilaterally and external auditory canal are red with flaky skin no lesion or obstructing wax on the left.       MIDDLE EAR: Tympanic membranes intact and mobile with normal landmarks.  Middle ear space appears well aerated.       TUBE STATUS: N/A       MASTOID CAVITY: N/A       HEARING: Gross hearing assessment is within normal limits.      NOSE:       VISUALIZED USING: Anterior rhinoscopy with headlight and nasal speculum.       DORSUM: Midline, nontraumatic appearance.       MUCOSA: Normal-appearing.       SECRETIONS: Normal.       SEPTUM: Midline and nonobstructing.       INFERIOR TURBINATES: Normal.     " "  MIDDLE TURBINATES/MEATUS: N/A       BLEEDING: N/A         ORAL CAVITY/PHARYNX:       TEETH: Adequate dentition.       TONGUE: No mass or lesion.  Normal mobility.       FLOOR OF MOUTH: No mass or lesion.       PALATE: Normal hard palate, soft palate, and uvula.       OROPHARYNX: Normal without mass or lesion.       BUCCAL MUCOSA/GBS: Normal without mass or lesion.       LIPS: Normal.     LARYNX/HYPOPHARYNX/NASOPHARYNX: Flexible laryngoscopy was performed after verbal consent obtained. Applied decongestant and topical anesthetic. The flexible laryngoscopy was placed through the nasal cavity revealing normal nasopharynx, normal oropharynx, normal hypopharynx and normal larynx. Normal bilateral vocal cord mobility without any mucosal masses or lesions.  General Visit Information:  Living Environment: Home     Date of order:  7/2/25      OBJECTIVE    Oral motor:  General appearance: mild left labial droop at rest.   Lingual: mild deviates left on excursion  Labial: mild left weakness  Palatal: wfl    Motor speech:  Intelligibility: mild impairment when rushed/ tired.   Typical utterance length: [facility]  Motor speech characteristics: Consonant distortions, Decreased breath support  Perceptual voice characteristics: nasal    Receptive language:  WFL     Expressive language:  WFL    Cognition:  WFL    Formal Assessments:  The following formal assessments were completed:  VHI-10  My voice makes it difficult for people to hear me.: Almost never  People have difficulty understanding me in a noisy room.: Almost never  My voice difficulties restrict my personal and social life.: Never  I feel left out of the conversations because of my voice: Never  My voice problem causes me to lose income.: Never  I feel as though I have to strain to produce voice.: Sometimes  The clarity of my voice is unpredictable.: Never  My voice problem upsets me.: Sometimes  My voice makes me feel handicapped.: Almost never  People ask, \"What's wrong " "with your voice?\": Never  VHI-10 Total Score: 7    Home Exercise Program:  Exercises reviewed and provided in writing.     Treatment/Education:  Results and recommendations were relayed to: Patient  Education provided: Yes   Learner: Patient   Barriers to learning: None   Method of teaching: Verbal and Written   Topic: role of ST, results of assessment, and recommended speech intelligibility strategies and voice exercises.    Outcome of teaching: Pt/family demonstrated good understanding  Treatment provided: Yes             Current Participants as of 7/25/2025    Name Type Comments Contact Info    NUNO Ervin-CNP Referring Provider  [phone]    Signature pending    ABHINAV Ramos Speech Language Pathologist  [phone]    Electronically signed by ABHINAV Ramos at 7/25/2025 9327 EDT      " reinforced need for mammogram/monthly

## 2025-08-22 ENCOUNTER — OFFICE VISIT (OUTPATIENT)
Dept: NEUROLOGY | Facility: CLINIC | Age: 85
End: 2025-08-22
Payer: MEDICARE

## 2025-08-22 VITALS
DIASTOLIC BLOOD PRESSURE: 75 MMHG | WEIGHT: 162 LBS | BODY MASS INDEX: 23.24 KG/M2 | HEART RATE: 64 BPM | SYSTOLIC BLOOD PRESSURE: 126 MMHG

## 2025-08-22 DIAGNOSIS — E78.00 HYPERCHOLESTEROLEMIA: ICD-10-CM

## 2025-08-22 DIAGNOSIS — I10 ESSENTIAL (PRIMARY) HYPERTENSION: ICD-10-CM

## 2025-08-22 DIAGNOSIS — I69.30 HISTORY OF STROKE WITH CURRENT RESIDUAL EFFECTS: Primary | ICD-10-CM

## 2025-08-22 DIAGNOSIS — I69.319 COGNITIVE DEFICIT AS LATE EFFECT OF CEREBROVASCULAR ACCIDENT (CVA): ICD-10-CM

## 2025-08-22 DIAGNOSIS — I69.354 HEMIPLEGIA AND HEMIPARESIS FOLLOWING CEREBRAL INFARCTION AFFECTING LEFT NON-DOMINANT SIDE (MULTI): ICD-10-CM

## 2025-08-22 DIAGNOSIS — I48.0 PAROXYSMAL ATRIAL FIBRILLATION (MULTI): ICD-10-CM

## 2025-08-22 PROCEDURE — 99212 OFFICE O/P EST SF 10 MIN: CPT

## 2025-08-22 ASSESSMENT — ACTIVITIES OF DAILY LIVING (ADL)
TOILET_USE: INDEPENDENT (ON AND OFF, DRESSING, WIPING)
DRESSING: INDEPENDENT (INCLUDING BUTTONS, ZIPS, LACES,ETC.)
BED_TO_CHAIR_AND_BACK: INDEPENDENT
TOTAL_SCORE: 100
BOWELS: INDEPENDENT (INCLUDING BUTTONS, ZIPS, LACES, ETC.)
BATHING: INDEPENDENT (OR IN SHOWER)
GROOMING: INDEPENDENT FACE/HAIR/TEETH.SHAVING (IMPLEMENTS PROVIDED)
STAIRS: INDEPENDENT
MOBILITY_LEVEL_SURFACES: INDEPENDENT (BUT MAY USE ANY AID FOR EXAMPLE, STICK) > 50 YARDS
FEEDING: INDEPENDENT
BLADDER: CONTINENT

## 2025-08-25 ENCOUNTER — APPOINTMENT (OUTPATIENT)
Dept: NEUROLOGY | Facility: CLINIC | Age: 85
End: 2025-08-25
Payer: MEDICARE

## 2025-08-27 ENCOUNTER — APPOINTMENT (OUTPATIENT)
Dept: OTOLARYNGOLOGY | Facility: CLINIC | Age: 85
End: 2025-08-27
Payer: MEDICARE

## 2025-08-28 DIAGNOSIS — E78.00 HYPERCHOLESTEROLEMIA: ICD-10-CM

## 2025-09-04 ENCOUNTER — APPOINTMENT (OUTPATIENT)
Dept: PRIMARY CARE | Facility: CLINIC | Age: 85
End: 2025-09-04
Payer: MEDICARE

## 2025-09-04 LAB
ALBUMIN SERPL-MCNC: 4.3 G/DL (ref 3.6–5.1)
ALP SERPL-CCNC: 38 U/L (ref 35–144)
ALT SERPL-CCNC: 15 U/L (ref 9–46)
ANION GAP SERPL CALCULATED.4IONS-SCNC: 7 MMOL/L (CALC) (ref 7–17)
AST SERPL-CCNC: 17 U/L (ref 10–35)
BILIRUB SERPL-MCNC: 0.8 MG/DL (ref 0.2–1.2)
BUN SERPL-MCNC: 13 MG/DL (ref 7–25)
CALCIUM SERPL-MCNC: 8.8 MG/DL (ref 8.6–10.3)
CHLORIDE SERPL-SCNC: 106 MMOL/L (ref 98–110)
CHOLEST SERPL-MCNC: 206 MG/DL
CHOLEST/HDLC SERPL: 5 (CALC)
CK SERPL-CCNC: 32 U/L (ref 17–247)
CO2 SERPL-SCNC: 29 MMOL/L (ref 20–32)
CREAT SERPL-MCNC: 0.87 MG/DL (ref 0.7–1.22)
EGFRCR SERPLBLD CKD-EPI 2021: 86 ML/MIN/1.73M2
GLUCOSE SERPL-MCNC: 98 MG/DL (ref 65–99)
HDLC SERPL-MCNC: 41 MG/DL
LDLC SERPL CALC-MCNC: 140 MG/DL (CALC)
NONHDLC SERPL-MCNC: 165 MG/DL (CALC)
POTASSIUM SERPL-SCNC: 4.1 MMOL/L (ref 3.5–5.3)
PROT SERPL-MCNC: 6.4 G/DL (ref 6.1–8.1)
SODIUM SERPL-SCNC: 142 MMOL/L (ref 135–146)
TRIGL SERPL-MCNC: 126 MG/DL

## 2025-09-05 ENCOUNTER — APPOINTMENT (OUTPATIENT)
Dept: DERMATOLOGY | Facility: CLINIC | Age: 85
End: 2025-09-05
Payer: MEDICARE

## 2025-10-15 ENCOUNTER — APPOINTMENT (OUTPATIENT)
Dept: OTOLARYNGOLOGY | Facility: CLINIC | Age: 85
End: 2025-10-15
Payer: MEDICARE

## 2025-11-07 ENCOUNTER — APPOINTMENT (OUTPATIENT)
Dept: DERMATOLOGY | Facility: CLINIC | Age: 85
End: 2025-11-07
Payer: MEDICARE

## 2025-12-09 ENCOUNTER — APPOINTMENT (OUTPATIENT)
Dept: PRIMARY CARE | Facility: CLINIC | Age: 85
End: 2025-12-09
Payer: MEDICARE

## (undated) DEVICE — TISSUE ADHESIVE, PREMIERPRO EXOFIN, PRECISION PEN HV, 1.0ML

## (undated) DEVICE — ACCESS SYS, KII SHIELDED BLADED, Z-THREAD, 5X100CM

## (undated) DEVICE — SUTURE, VICRYL, 4-0, 18 IN, PS2, UNDYED

## (undated) DEVICE — DRAPE PACK, LAPAROSCOPIC CHOLECYSTECTOMY, CUSTOM, GEAUGA

## (undated) DEVICE — TUBE SET, PNEUMOCLEAR, SMOKE EVACU, HIGH-FLOW

## (undated) DEVICE — CAUTERY, PENCIL, PUSH BUTTON, SMOKE EVAC, 70MM

## (undated) DEVICE — APPLICATOR, CHLORAPREP, W/ORANGE TINT, 26ML

## (undated) DEVICE — ELECTRODE, ELECTROSURGICAL, BLADE, INSULATED, ENT/IMA, STERILE

## (undated) DEVICE — SLEEVE, KII, Z-THREAD, 5X100CM

## (undated) DEVICE — TROCAR SYSTEM, BALLOON, KII GELPORT, 12 X 100MM

## (undated) DEVICE — ACCESS SYSTEM, KII DISSECTING BALLOON, OVAL, STAND ALONE

## (undated) DEVICE — CLIP, ENDO APPLIER LIGAMAX 5MM

## (undated) DEVICE — SUTURE, VICRYL, 0, 27 IN, UR-6, VIOLET

## (undated) DEVICE — NEEDLE, SAFETY, 21 G X 1.5 IN

## (undated) DEVICE — CARE KIT, LAPAROSCOPIC, ADVANCED

## (undated) DEVICE — ABSORBATACK, 5MM WITH 30 ABSORBABLE TACKS

## (undated) DEVICE — SYRINGE, 60 CC, LUER LOCK, MONOJECT, W/O CAP, LF

## (undated) DEVICE — COVER HANDLE LIGHT, STERIS, BLUE, STERILE